# Patient Record
Sex: MALE | Race: WHITE | Employment: OTHER | URBAN - NONMETROPOLITAN AREA
[De-identification: names, ages, dates, MRNs, and addresses within clinical notes are randomized per-mention and may not be internally consistent; named-entity substitution may affect disease eponyms.]

---

## 2017-03-21 ENCOUNTER — OFFICE VISIT (OUTPATIENT)
Dept: FAMILY MEDICINE CLINIC | Age: 62
End: 2017-03-21

## 2017-03-21 VITALS
WEIGHT: 274.4 LBS | HEART RATE: 43 BPM | SYSTOLIC BLOOD PRESSURE: 119 MMHG | BODY MASS INDEX: 38.42 KG/M2 | HEIGHT: 71 IN | DIASTOLIC BLOOD PRESSURE: 74 MMHG | RESPIRATION RATE: 16 BRPM

## 2017-03-21 DIAGNOSIS — R00.1 BRADYCARDIA: ICD-10-CM

## 2017-03-21 DIAGNOSIS — E11.9 TYPE 2 DIABETES MELLITUS WITHOUT COMPLICATION, WITHOUT LONG-TERM CURRENT USE OF INSULIN (HCC): ICD-10-CM

## 2017-03-21 DIAGNOSIS — C61 PRIMARY PROSTATE CANCER (HCC): Primary | ICD-10-CM

## 2017-03-21 PROCEDURE — 99202 OFFICE O/P NEW SF 15 MIN: CPT | Performed by: FAMILY MEDICINE

## 2017-03-21 PROCEDURE — 93000 ELECTROCARDIOGRAM COMPLETE: CPT | Performed by: FAMILY MEDICINE

## 2017-03-21 RX ORDER — ATORVASTATIN CALCIUM 20 MG/1
20 TABLET, FILM COATED ORAL DAILY
COMMUNITY
End: 2017-07-24 | Stop reason: SDUPTHER

## 2017-03-21 ASSESSMENT — PATIENT HEALTH QUESTIONNAIRE - PHQ9
1. LITTLE INTEREST OR PLEASURE IN DOING THINGS: 0
SUM OF ALL RESPONSES TO PHQ QUESTIONS 1-9: 1
2. FEELING DOWN, DEPRESSED OR HOPELESS: 1
SUM OF ALL RESPONSES TO PHQ9 QUESTIONS 1 & 2: 1

## 2017-03-21 ASSESSMENT — ENCOUNTER SYMPTOMS
EYE DISCHARGE: 0
SHORTNESS OF BREATH: 0
SORE THROAT: 0
RHINORRHEA: 0
ABDOMINAL PAIN: 0
WHEEZING: 0
PHOTOPHOBIA: 0
NAUSEA: 0
VOMITING: 0

## 2017-04-04 ENCOUNTER — TELEPHONE (OUTPATIENT)
Dept: FAMILY MEDICINE CLINIC | Age: 62
End: 2017-04-04

## 2017-06-22 ENCOUNTER — OFFICE VISIT (OUTPATIENT)
Dept: PRIMARY CARE CLINIC | Age: 62
End: 2017-06-22

## 2017-06-22 VITALS
HEART RATE: 82 BPM | SYSTOLIC BLOOD PRESSURE: 137 MMHG | DIASTOLIC BLOOD PRESSURE: 77 MMHG | RESPIRATION RATE: 18 BRPM | HEIGHT: 71 IN | WEIGHT: 262 LBS | OXYGEN SATURATION: 95 % | TEMPERATURE: 96.1 F | BODY MASS INDEX: 36.68 KG/M2

## 2017-06-22 DIAGNOSIS — L73.9 ACUTE FOLLICULITIS: ICD-10-CM

## 2017-06-22 DIAGNOSIS — L02.211 ABSCESS OF SKIN OF ABDOMEN: Primary | ICD-10-CM

## 2017-06-22 PROCEDURE — 99214 OFFICE O/P EST MOD 30 MIN: CPT | Performed by: NURSE PRACTITIONER

## 2017-06-22 PROCEDURE — 10060 I&D ABSCESS SIMPLE/SINGLE: CPT | Performed by: NURSE PRACTITIONER

## 2017-06-22 PROCEDURE — A4930 STERILE, GLOVES PER PAIR: HCPCS | Performed by: NURSE PRACTITIONER

## 2017-06-22 PROCEDURE — A4215 STERILE NEEDLE: HCPCS | Performed by: NURSE PRACTITIONER

## 2017-06-22 RX ORDER — SILDENAFIL CITRATE 20 MG/1
2 TABLET ORAL DAILY
COMMUNITY
Start: 2017-05-18 | End: 2018-08-16

## 2017-06-22 RX ORDER — SULFAMETHOXAZOLE AND TRIMETHOPRIM 800; 160 MG/1; MG/1
1 TABLET ORAL 2 TIMES DAILY
Qty: 20 TABLET | Refills: 0 | Status: SHIPPED | OUTPATIENT
Start: 2017-06-22 | End: 2017-07-02

## 2017-06-22 ASSESSMENT — ENCOUNTER SYMPTOMS
VOMITING: 0
CONSTIPATION: 0
NAUSEA: 0
CHANGE IN BOWEL HABIT: 0
SWOLLEN GLANDS: 0
ABDOMINAL DISTENTION: 0
DIARRHEA: 0
ABDOMINAL PAIN: 0

## 2017-06-23 ENCOUNTER — TELEPHONE (OUTPATIENT)
Dept: PRIMARY CARE CLINIC | Age: 62
End: 2017-06-23

## 2017-06-23 DIAGNOSIS — L08.9 STAPHYLOCOCCAL INFECTION OF SKIN: Primary | ICD-10-CM

## 2017-06-23 DIAGNOSIS — B95.8 STAPHYLOCOCCAL INFECTION OF SKIN: Primary | ICD-10-CM

## 2017-06-23 ASSESSMENT — ENCOUNTER SYMPTOMS: COLOR CHANGE: 0

## 2017-06-26 ENCOUNTER — OFFICE VISIT (OUTPATIENT)
Dept: PRIMARY CARE CLINIC | Age: 62
End: 2017-06-26

## 2017-06-26 VITALS
DIASTOLIC BLOOD PRESSURE: 75 MMHG | RESPIRATION RATE: 16 BRPM | OXYGEN SATURATION: 95 % | TEMPERATURE: 98.9 F | SYSTOLIC BLOOD PRESSURE: 119 MMHG | HEART RATE: 52 BPM

## 2017-06-26 DIAGNOSIS — Z51.89 WOUND CHECK, ABSCESS: Primary | ICD-10-CM

## 2017-06-26 DIAGNOSIS — A49.02 MRSA (METHICILLIN RESISTANT STAPHYLOCOCCUS AUREUS): ICD-10-CM

## 2017-06-26 PROBLEM — D64.9 ANEMIA: Status: ACTIVE | Noted: 2017-06-26

## 2017-06-26 PROBLEM — Z92.89 PERSONAL HISTORY OF OTHER MEDICAL TREATMENT: Status: ACTIVE | Noted: 2017-06-26

## 2017-06-26 PROBLEM — K42.9 UMBILICAL HERNIA WITHOUT OBSTRUCTION AND WITHOUT GANGRENE: Status: ACTIVE | Noted: 2017-05-02

## 2017-06-26 PROBLEM — N43.3 HYDROCELE: Status: ACTIVE | Noted: 2017-06-26

## 2017-06-26 PROBLEM — E78.5 HYPERLIPIDEMIA: Status: ACTIVE | Noted: 2017-06-26

## 2017-06-26 PROBLEM — Z87.438: Status: ACTIVE | Noted: 2017-06-26

## 2017-06-26 PROCEDURE — 99024 POSTOP FOLLOW-UP VISIT: CPT | Performed by: NURSE PRACTITIONER

## 2017-06-26 ASSESSMENT — ENCOUNTER SYMPTOMS
ABDOMINAL DISTENTION: 0
NAUSEA: 0
ABDOMINAL PAIN: 0
VOMITING: 0
COLOR CHANGE: 0

## 2017-06-27 LAB
AEROBIC CULTURE: ABNORMAL
ANAEROBIC CULTURE: ABNORMAL
GRAM STAIN RESULT: ABNORMAL
ORGANISM: ABNORMAL

## 2017-07-24 ENCOUNTER — OFFICE VISIT (OUTPATIENT)
Dept: FAMILY MEDICINE CLINIC | Age: 62
End: 2017-07-24
Payer: COMMERCIAL

## 2017-07-24 VITALS
HEIGHT: 71 IN | BODY MASS INDEX: 36.82 KG/M2 | HEART RATE: 56 BPM | DIASTOLIC BLOOD PRESSURE: 84 MMHG | WEIGHT: 263 LBS | SYSTOLIC BLOOD PRESSURE: 116 MMHG | RESPIRATION RATE: 12 BRPM | TEMPERATURE: 97.9 F

## 2017-07-24 DIAGNOSIS — Z12.11 SCREEN FOR COLON CANCER: ICD-10-CM

## 2017-07-24 DIAGNOSIS — M25.511 ACUTE PAIN OF RIGHT SHOULDER: ICD-10-CM

## 2017-07-24 DIAGNOSIS — Z11.59 NEED FOR HEPATITIS C SCREENING TEST: ICD-10-CM

## 2017-07-24 DIAGNOSIS — C61 PRIMARY PROSTATE CANCER (HCC): ICD-10-CM

## 2017-07-24 DIAGNOSIS — E11.9 TYPE 2 DIABETES MELLITUS WITHOUT COMPLICATION, WITHOUT LONG-TERM CURRENT USE OF INSULIN (HCC): Primary | ICD-10-CM

## 2017-07-24 DIAGNOSIS — H11.053: ICD-10-CM

## 2017-07-24 DIAGNOSIS — E78.2 MIXED HYPERLIPIDEMIA: ICD-10-CM

## 2017-07-24 DIAGNOSIS — Z90.79 S/P PROSTATECTOMY: ICD-10-CM

## 2017-07-24 PROCEDURE — 99204 OFFICE O/P NEW MOD 45 MIN: CPT | Performed by: NURSE PRACTITIONER

## 2017-07-24 RX ORDER — ATORVASTATIN CALCIUM 20 MG/1
20 TABLET, FILM COATED ORAL DAILY
Qty: 90 TABLET | Refills: 3 | Status: SHIPPED | OUTPATIENT
Start: 2017-07-24 | End: 2018-02-22 | Stop reason: SDUPTHER

## 2017-10-16 ENCOUNTER — OFFICE VISIT (OUTPATIENT)
Dept: UROLOGY | Age: 62
End: 2017-10-16
Payer: COMMERCIAL

## 2017-10-16 VITALS
WEIGHT: 273 LBS | SYSTOLIC BLOOD PRESSURE: 149 MMHG | BODY MASS INDEX: 38.22 KG/M2 | HEIGHT: 71 IN | DIASTOLIC BLOOD PRESSURE: 88 MMHG | HEART RATE: 58 BPM

## 2017-10-16 DIAGNOSIS — C61 MALIGNANT NEOPLASM PROSTATE (HCC): Primary | ICD-10-CM

## 2017-10-16 LAB
BILIRUBIN URINE: NEGATIVE
BLOOD URINE, POC: NEGATIVE
CHARACTER, URINE: CLEAR
COLOR, URINE: YELLOW
GLUCOSE URINE: NEGATIVE MG/DL
KETONES, URINE: NEGATIVE
LEUKOCYTE CLUMPS, URINE: NEGATIVE
NITRITE, URINE: NEGATIVE
PH, URINE: 5.5
PROTEIN, URINE: NEGATIVE MG/DL
SPECIFIC GRAVITY, URINE: >= 1.03 (ref 1–1.03)
UROBILINOGEN, URINE: 0.2 EU/DL

## 2017-10-16 PROCEDURE — 99204 OFFICE O/P NEW MOD 45 MIN: CPT | Performed by: NURSE PRACTITIONER

## 2017-10-16 PROCEDURE — 81003 URINALYSIS AUTO W/O SCOPE: CPT | Performed by: NURSE PRACTITIONER

## 2017-10-16 NOTE — PROGRESS NOTES
[facility]  [facility]  [address] [address] [address]  Dept: [phone]  Loc: [phone]  Visit Date: 10/16/2017      HPI:     Yanet Hernández is a 58 y.o. with past medical history of Diabetes and prostate cancer who presents today to establish local care for his prostate cancer. Sourav Denton most recently underwent a robot-assisted laparoscopic radical prostatectomy by Dr. Kenyetta Read in [place] in 05/2017. Final pathology revealed [place] score 3+4 = 7, grade group 2/5. Adenocarcinoma was bilateral and involves roughly 15-20% of prostate gland. There was very focal extraprostatic extension and shows invasion into bladder neck. Infiltrating adenocarcinoma extends to bladder neck margin. Remaining apical and peripheral margins of prostatectomy specimen are free of malignancy. There was focal changes suspicious for lymphatic space invasion by carcinoma, 8 lymph nodes were negative for metastatic neoplasm, pT3a, pN0. Sourav Denton is doing well postop. He reports some stress incontinence with coughing, bending down. He is currently taking Revatio 20 mg daily for a penile rehab program. Denies any spontaneous erections. Initial PSA on 8/17/17 was undetectable, less than 0.03. He comes in today by himself. History is obtained from the patient and the medical record. Current Outpatient Prescriptions   Medication Sig Dispense Refill    metFORMIN (GLUCOPHAGE) 500 MG tablet Take 1 tablet by mouth 2 times daily (with meals) 180 tablet 3    atorvastatin (LIPITOR) 20 MG tablet Take 1 tablet by mouth daily 90 tablet 3    sildenafil (REVATIO) 20 MG tablet Take 2 tablets by mouth daily      aspirin 81 MG chewable tablet Take 81 mg by mouth daily. No current facility-administered medications for this visit. Past Medical History  Sourav Denton  has a past medical history of Cancer ([address] Diabetes type 2, controlled ([address] and Hypertensive retinopathy.     Past Surgical History  The patient has a past surgical history that includes Biceps tendon repair (Right, 2015); knee surgery (Bilateral, 2011); hernia repair (05/02/2017); and ostatectomy (05/2017). Family History  This patient's family history includes Alzheimer's Disease in his mother; Cancer in his father; Other in his mother. Social History  Huber Moran  reports that he quit smoking about 6 years ago. He smoked 5.00 packs per day. He has quit using smokeless tobacco. He reports that he drinks alcohol. He reports that he does not use drugs. Subjective:     Review of Systems  No problems with ears, nose or throat. No problems with eyes. No chest pain, shortness of breath, abdominal pain, extremity pain or weakness, and no neurological deficits. No rashes.  symptoms per HPI. The remainder of the review of symptoms is negative. Objective:     PE:   Vitals:    10/16/17 1337   BP: (!) 149/88   Site: Left Arm   Position: Sitting   Pulse: 58   Weight: 273 lb (123.8 kg)   Height: 5' 11\" (1.803 m)       Constitutional: Alert and oriented times 3, no acute distress and cooperative to examination with appropriate mood and affect. HENT:   Head:        Normocephalic and atraumatic. Mouth/Throat:         Mucous membranes are normal.   Eyes:         EOM are normal. No scleral icterus. PERRLA. Neck:        Supple, symmetrical, trachea midline  Cardiovascular:        Normal rate, regular rhythm, S1 S2 heart sounds. No murmurs, rub, or gallops. Pulses:       Radial pulses are 2+/4 bilateral and equal. Posterior tibialis 2+/4 bilateral and equal  Pulmonary/Chest:      Chest symmetric with normal A/P diameter,  CTA with no wheezes, rales, or rhonchi noted. Normal respiratory rate and rhthym. No use of accessory muscles. Abdominal:         Soft. No tenderness. Bowel sounds present. Musculoskeletal:         Normal range of motion. No edema or tenderness of lower extremities.     Extremities: No cyanosis, clubbing, or edema

## 2017-11-15 ENCOUNTER — HOSPITAL ENCOUNTER (OUTPATIENT)
Age: 62
Discharge: HOME | End: 2017-11-15
Payer: COMMERCIAL

## 2017-11-15 DIAGNOSIS — C61 MALIGNANT NEOPLASM PROSTATE (HCC): ICD-10-CM

## 2017-11-15 LAB — PROSTATE SPECIFIC ANTIGEN: < 0.02 NG/ML (ref 0–1)

## 2017-11-15 PROCEDURE — 84153 ASSAY OF PSA TOTAL: CPT

## 2017-11-15 PROCEDURE — 36415 COLL VENOUS BLD VENIPUNCTURE: CPT

## 2017-11-21 ENCOUNTER — TELEPHONE (OUTPATIENT)
Dept: UROLOGY | Age: 62
End: 2017-11-21

## 2018-02-12 ENCOUNTER — TELEPHONE (OUTPATIENT)
Dept: FAMILY MEDICINE CLINIC | Age: 63
End: 2018-02-12

## 2018-02-13 NOTE — TELEPHONE ENCOUNTER
Reprint labs from July. I will be happy to review prostate labs with him.  I still recommend f/u with urology annually

## 2018-02-14 DIAGNOSIS — Z12.11 SCREEN FOR COLON CANCER: ICD-10-CM

## 2018-02-14 LAB
CONTROL: PRESENT
HEMOCCULT STL QL: NEGATIVE

## 2018-02-14 PROCEDURE — 82274 ASSAY TEST FOR BLOOD FECAL: CPT | Performed by: NURSE PRACTITIONER

## 2018-02-16 LAB
A/G RATIO: 2.3 (ref 1.5–2.5)
ALBUMIN SERPL-MCNC: 5 GM/DL (ref 3.5–5)
ALP BLD-CCNC: 56 IU/L (ref 41–137)
ALT SERPL-CCNC: 27 IU/L (ref 10–40)
ANION GAP SERPL CALCULATED.3IONS-SCNC: 5 MMOL/L (ref 4–12)
AST SERPL-CCNC: 23 IU/L (ref 15–41)
BILIRUB SERPL-MCNC: 0.9 MG/DL (ref 0.2–1)
BUN BLDV-MCNC: 14 MG/DL (ref 7–20)
CALCIUM SERPL-MCNC: 9.4 MG/DL (ref 8.8–10.5)
CHLORIDE BLD-SCNC: 107 MEQ/L (ref 101–111)
CHOLESTEROL/HDL RELATIVE RISK: 3 (ref 4–5)
CHOLESTEROL: 117 MG/DL
CO2: 28 MEQ/L (ref 21–32)
CREAT SERPL-MCNC: 1.11 MG/DL (ref 0.7–1.3)
CREATININE CLEARANCE: >60
DIRECT-LDL / HDL RISK: 2.1
ESTIMATED AVERAGE GLUCOSE: 166 MG/DL
GLUCOSE: 163 MG/DL (ref 70–110)
HBA1C MFR BLD: 7.4 % (ref 4.4–6.4)
HCV AB: NONREACTIVE
HDLC SERPL-MCNC: 38 MG/DL
LDL CHOLESTEROL DIRECT: 82 MG/DL
POTASSIUM SERPL-SCNC: 4.6 MEQ/L (ref 3.6–5)
PROSTATE SPECIFIC ANTIGEN: <0.01 NG/ML
PROTEIN, URINE, RANDOM: 9 MG/DL
SODIUM BLD-SCNC: 140 MEQ/L (ref 135–145)
TOTAL PROTEIN: 7.2 G/DL (ref 6.2–8)
TRIGL SERPL-MCNC: 72 MG/DL
VLDLC SERPL CALC-MCNC: 14 MG/DL

## 2018-02-20 ENCOUNTER — TELEPHONE (OUTPATIENT)
Dept: FAMILY MEDICINE CLINIC | Age: 63
End: 2018-02-20

## 2018-02-22 ENCOUNTER — OFFICE VISIT (OUTPATIENT)
Dept: FAMILY MEDICINE CLINIC | Age: 63
End: 2018-02-22
Payer: COMMERCIAL

## 2018-02-22 VITALS
WEIGHT: 266 LBS | RESPIRATION RATE: 16 BRPM | HEART RATE: 56 BPM | HEIGHT: 71 IN | BODY MASS INDEX: 37.24 KG/M2 | DIASTOLIC BLOOD PRESSURE: 78 MMHG | TEMPERATURE: 98 F | SYSTOLIC BLOOD PRESSURE: 124 MMHG

## 2018-02-22 DIAGNOSIS — E11.9 TYPE 2 DIABETES MELLITUS WITHOUT COMPLICATION, WITHOUT LONG-TERM CURRENT USE OF INSULIN (HCC): Primary | ICD-10-CM

## 2018-02-22 DIAGNOSIS — E78.2 MIXED HYPERLIPIDEMIA: ICD-10-CM

## 2018-02-22 DIAGNOSIS — C61 PRIMARY PROSTATE CANCER (HCC): ICD-10-CM

## 2018-02-22 PROCEDURE — 99213 OFFICE O/P EST LOW 20 MIN: CPT | Performed by: NURSE PRACTITIONER

## 2018-02-22 RX ORDER — ATORVASTATIN CALCIUM 20 MG/1
20 TABLET, FILM COATED ORAL DAILY
Qty: 90 TABLET | Refills: 3 | Status: CANCELLED | OUTPATIENT
Start: 2018-02-22

## 2018-02-22 RX ORDER — ATORVASTATIN CALCIUM 20 MG/1
20 TABLET, FILM COATED ORAL DAILY
Qty: 90 TABLET | Refills: 3 | Status: SHIPPED | OUTPATIENT
Start: 2018-02-22 | End: 2018-02-22 | Stop reason: SDUPTHER

## 2018-02-22 RX ORDER — ATORVASTATIN CALCIUM 20 MG/1
20 TABLET, FILM COATED ORAL DAILY
Qty: 90 TABLET | Refills: 3 | Status: SHIPPED | OUTPATIENT
Start: 2018-02-22 | End: 2019-01-19 | Stop reason: SDUPTHER

## 2018-02-27 NOTE — PROGRESS NOTES
syncope. IV only     Health Maintenance   Topic Date Due    HIV screen  03/21/1970    Diabetic microalbuminuria test  03/21/1973    DTaP/Tdap/Td vaccine (1 - Tdap) 03/21/1974    Pneumococcal med risk (1 of 1 - PPSV23) 03/21/1974    Shingles Vaccine (1 of 2 - 2 Dose Series) 03/21/2005    Flu vaccine (1) 09/01/2017    Diabetic retinal exam  05/10/2018    Diabetic foot exam  07/24/2018    Colon Cancer Screen FIT/FOBT  02/14/2019    A1C test (Diabetic or Prediabetic)  02/16/2019    Lipid screen  02/16/2019    Hepatitis C screen  Completed         Objective:     Physical Exam  /78   Pulse 56   Temp 98 °F (36.7 °C) (Oral)   Resp 16   Ht 5' 10.75\" (1.797 m)   Wt 266 lb (120.7 kg)   BMI 37.36 kg/m²   Component      Latest Ref Rng & Units 2/16/2018 2/14/2018   Sodium      135 - 145 mEq/L 140    Potassium      3.6 - 5.0 mEq/L 4.6    Chloride      101 - 111 mEq/L 107    CO2      21 - 32 mEq/L 28    Anion Gap      4 - 12 5    Glucose      70 - 110 mg/dL 163 (H)    BUN      7 - 20 mg/dL 14    Creatinine      0.70 - 1.30 mg/dL 1.11    Creatinine Clearance       >60    AST      15 - 41 IU/L 23    Alk Phos      41 - 137 IU/L 56    Bilirubin      0.2 - 1.0 mg/dL 0.9    Calcium      8.8 - 10.5 mg/dL 9.4    Albumin      3.5 - 5.0 gm/dL 5.0    Total Protein      6.2 - 8.0 g/dL 7.2    Albumin/Globulin Ratio      1.5 - 2.5 2.3    ALT      10 - 40 IU/L 27    LDL Direct      mg/dl 82    Cholesterol      <200 mg/dL 117    Triglycerides      <150 mg/dL 72    HDL Cholesterol      mg/dL 38 (L)    CHOLESTEROL/HDL RELATIVE RISK      4.0 - 5.0 3.0 (L)    Direct-LDL / HDL Risk      <3.1 2.1    VLDL      <39 mg/dL 14    OCCULT BLOOD FECAL        Negative   Control        Present   Hemoglobin A1C      4.4 - 6.4 % 7.4 (H)    eAG (mg/dL)      mg/dL 166    Protein, Urine, Random      mg/dL 9.0    Prostatic Specific Ag      ng/mL <0.01    HCV Ab      Nonreactiv Nonreactive        Impression/Plan:  1.  Type 2 diabetes mellitus

## 2018-07-24 ENCOUNTER — HOSPITAL ENCOUNTER (OUTPATIENT)
Age: 63
Setting detail: SPECIMEN
Discharge: HOME | End: 2018-07-24
Payer: COMMERCIAL

## 2018-07-24 PROCEDURE — 88305 TISSUE EXAM BY PATHOLOGIST: CPT

## 2018-08-13 ENCOUNTER — TELEPHONE (OUTPATIENT)
Dept: FAMILY MEDICINE CLINIC | Age: 63
End: 2018-08-13

## 2018-08-13 DIAGNOSIS — D64.9 ANEMIA, UNSPECIFIED TYPE: ICD-10-CM

## 2018-08-13 DIAGNOSIS — Z12.5 PROSTATE CANCER SCREENING: ICD-10-CM

## 2018-08-13 DIAGNOSIS — E78.2 MIXED HYPERLIPIDEMIA: ICD-10-CM

## 2018-08-13 DIAGNOSIS — E11.9 TYPE 2 DIABETES MELLITUS WITHOUT COMPLICATION, WITHOUT LONG-TERM CURRENT USE OF INSULIN (HCC): Primary | ICD-10-CM

## 2018-08-15 LAB
A/G RATIO: 1.9 (ref 1.5–2.5)
ABSOLUTE BASO #: 0 /CMM (ref 0–200)
ABSOLUTE EOS #: 200 /CMM (ref 0–500)
ABSOLUTE LYMPH #: 1700 /CMM (ref 1000–4800)
ABSOLUTE MONO #: 600 /CMM (ref 0–800)
ABSOLUTE NEUT #: 4000 /CMM (ref 1800–7700)
ALBUMIN SERPL-MCNC: 4.5 GM/DL (ref 3.5–5)
ALP BLD-CCNC: 51 IU/L (ref 41–137)
ALT SERPL-CCNC: 28 IU/L (ref 10–40)
ANION GAP SERPL CALCULATED.3IONS-SCNC: 8 MMOL/L (ref 4–12)
AST SERPL-CCNC: 21 IU/L (ref 15–41)
BASOPHILS RELATIVE PERCENT: 0.7 % (ref 0–2)
BILIRUB SERPL-MCNC: 0.6 MG/DL (ref 0.2–1)
BUN BLDV-MCNC: 19 MG/DL (ref 7–20)
CALCIUM SERPL-MCNC: 8.9 MG/DL (ref 8.8–10.5)
CHLORIDE BLD-SCNC: 105 MEQ/L (ref 101–111)
CHOLESTEROL/HDL RELATIVE RISK: 2.9 (ref 4–5)
CHOLESTEROL: 105 MG/DL
CO2: 27 MEQ/L (ref 21–32)
CREAT SERPL-MCNC: 0.93 MG/DL (ref 0.7–1.3)
CREATININE CLEARANCE: >60
CREATININE, RANDOM URINE: 255.4 MG/DL
DIRECT-LDL / HDL RISK: 1.9
EOSINOPHILS RELATIVE PERCENT: 3.6 % (ref 0–6)
ESTIMATED AVERAGE GLUCOSE: 163 MG/DL
GLUCOSE: 158 MG/DL (ref 70–110)
HBA1C MFR BLD: 7.3 % (ref 4.4–6.4)
HCT VFR BLD CALC: 46.4 % (ref 40–49)
HDLC SERPL-MCNC: 36 MG/DL
HEMOGLOBIN: 15.8 GM/DL (ref 13.5–16.5)
LDL CHOLESTEROL DIRECT: 69 MG/DL
LYMPHOCYTES RELATIVE PERCENT: 25.9 % (ref 15–45)
MCH RBC QN AUTO: 29.8 PG (ref 27.5–33)
MCHC RBC AUTO-ENTMCNC: 33.9 GM/DL (ref 33–36)
MCV RBC AUTO: 87.8 CU MIC (ref 80–97)
MICROALBUMIN UR-MCNC: 9.3 MG/L
MICROALBUMIN/CREAT UR-RTO: 3.6 MG/GM (ref 0–30)
MONOCYTES RELATIVE PERCENT: 9.4 % (ref 2–10)
NEUTROPHILS RELATIVE PERCENT: 60.4 % (ref 40–70)
NUCLEATED RBCS: 0 /100 WBC
PDW BLD-RTO: 13.8 % (ref 12–16)
PLATELET # BLD: 204 TH/CMM (ref 150–400)
POTASSIUM SERPL-SCNC: 4.5 MEQ/L (ref 3.6–5)
PROSTATE SPECIFIC ANTIGEN: 0.01 NG/ML
RBC # BLD: 5.29 MIL/CMM (ref 4.5–6)
SODIUM BLD-SCNC: 140 MEQ/L (ref 135–145)
TOTAL PROTEIN: 6.9 G/DL (ref 6.2–8)
TRIGL SERPL-MCNC: 59 MG/DL
VLDLC SERPL CALC-MCNC: 11 MG/DL
WBC # BLD: 6.7 TH/CMM (ref 4.4–10.5)

## 2018-08-16 ENCOUNTER — OFFICE VISIT (OUTPATIENT)
Dept: FAMILY MEDICINE CLINIC | Age: 63
End: 2018-08-16
Payer: COMMERCIAL

## 2018-08-16 VITALS
DIASTOLIC BLOOD PRESSURE: 86 MMHG | TEMPERATURE: 98.6 F | HEIGHT: 69 IN | HEART RATE: 60 BPM | WEIGHT: 261 LBS | BODY MASS INDEX: 38.66 KG/M2 | SYSTOLIC BLOOD PRESSURE: 136 MMHG | RESPIRATION RATE: 16 BRPM

## 2018-08-16 DIAGNOSIS — E78.2 MIXED HYPERLIPIDEMIA: ICD-10-CM

## 2018-08-16 DIAGNOSIS — C61 PRIMARY PROSTATE CANCER (HCC): ICD-10-CM

## 2018-08-16 DIAGNOSIS — M54.50 CHRONIC LEFT-SIDED LOW BACK PAIN WITHOUT SCIATICA: ICD-10-CM

## 2018-08-16 DIAGNOSIS — E11.9 TYPE 2 DIABETES MELLITUS WITHOUT COMPLICATION, WITHOUT LONG-TERM CURRENT USE OF INSULIN (HCC): Primary | ICD-10-CM

## 2018-08-16 DIAGNOSIS — G89.29 CHRONIC LEFT-SIDED LOW BACK PAIN WITHOUT SCIATICA: ICD-10-CM

## 2018-08-16 PROCEDURE — 99214 OFFICE O/P EST MOD 30 MIN: CPT | Performed by: NURSE PRACTITIONER

## 2018-08-16 ASSESSMENT — PATIENT HEALTH QUESTIONNAIRE - PHQ9
SUM OF ALL RESPONSES TO PHQ QUESTIONS 1-9: 0
2. FEELING DOWN, DEPRESSED OR HOPELESS: 0
SUM OF ALL RESPONSES TO PHQ QUESTIONS 1-9: 0
SUM OF ALL RESPONSES TO PHQ9 QUESTIONS 1 & 2: 0
1. LITTLE INTEREST OR PLEASURE IN DOING THINGS: 0

## 2018-08-16 NOTE — PATIENT INSTRUCTIONS
touching the floor and the other heel touching the wall. 4. Repeat with your other leg. 5. Do 2 to 4 times for each leg. Hip flexor stretch    1. Kneel on the floor with one knee bent and one leg behind you. Place your forward knee over your foot. Keep your other knee touching the floor. 2. Slowly push your hips forward until you feel a stretch in the upper thigh of your rear leg. 3. Hold the stretch for at least 15 to 30 seconds. Repeat with your other leg. 4. Do 2 to 4 times on each side. Wall sit    1. Stand with your back 10 to 12 inches away from a wall. 2. Lean into the wall until your back is flat against it. 3. Slowly slide down until your knees are slightly bent, pressing your lower back into the wall. 4. Hold for about 6 seconds, then slide back up the wall. 5. Repeat 8 to 12 times. Follow-up care is a key part of your treatment and safety. Be sure to make and go to all appointments, and call your doctor if you are having problems. It's also a good idea to know your test results and keep a list of the medicines you take. Where can you learn more? Go to https://[employer]WEALTH at work. org and sign in to your [employer] account. Enter J703 in the [employer] box to learn more about \"Low Back Pain: Exercises. \"     If you do not have an account, please click on the \"Sign Up Now\" link. Current as of: November 29, 2017  Content Version: 11.7  © 0296-6745 [employer]. Care instructions adapted under license by [facility] [address] If you have questions about a medical condition or this instruction, always ask your healthcare professional. [place] [address] any warranty or liability for your use of this information. Patient Education        [employer]: Exercises  Your Care Instructions  Here are some examples of typical rehabilitation exercises for your condition. Start each exercise slowly. Ease off the exercise if you start to have pain.   Your doctor or

## 2018-08-27 ENCOUNTER — TELEPHONE (OUTPATIENT)
Dept: FAMILY MEDICINE CLINIC | Age: 63
End: 2018-08-27

## 2018-08-30 ASSESSMENT — ENCOUNTER SYMPTOMS
EYES NEGATIVE: 1
RESPIRATORY NEGATIVE: 1
BACK PAIN: 1
GASTROINTESTINAL NEGATIVE: 1
ALLERGIC/IMMUNOLOGIC NEGATIVE: 1

## 2018-08-30 NOTE — PROGRESS NOTES
Outpatient Prescriptions   Medication Sig Dispense Refill    empagliflozin (JARDIANCE) 10 MG tablet Take 1 tablet by mouth daily 30 tablet 0    atorvastatin (LIPITOR) 20 MG tablet Take 1 tablet by mouth daily 90 tablet 3    metFORMIN (GLUCOPHAGE) 500 MG tablet Take 2 tabs in am  Then 1 tab in evening 270 tablet 3    aspirin 81 MG chewable tablet Take 81 mg by mouth daily. No current facility-administered medications for this visit. Allergies   Allergen Reactions    Dye [Iodides] Other (See Comments)     Nausea, alternating hot and cold and syncope. IV only     Health Maintenance   Topic Date Due    HIV screen  03/21/1970    DTaP/Tdap/Td vaccine (1 - Tdap) 03/21/1974    Pneumococcal med risk (1 of 1 - PPSV23) 03/21/1974    Shingles Vaccine (1 of 2 - 2 Dose Series) 03/21/2005    Diabetic retinal exam  05/10/2018    Diabetic foot exam  07/24/2018    Flu vaccine (1) 09/01/2018    Colon Cancer Screen FIT/FOBT  02/14/2019    A1C test (Diabetic or Prediabetic)  08/15/2019    Diabetic microalbuminuria test  08/15/2019    Lipid screen  08/15/2019    Hepatitis C screen  Completed         Objective:     Physical Exam   Constitutional: He is oriented to person, place, and time. He appears well-developed and well-nourished. HENT:   Head: Normocephalic. Mouth/Throat: Oropharynx is clear and moist.   Eyes: Conjunctivae are normal.   Neck: Neck supple. No JVD present. No thyromegaly present. Cardiovascular: Normal rate, regular rhythm, normal heart sounds and intact distal pulses. Pulmonary/Chest: Effort normal and breath sounds normal.   Abdominal: Soft. Bowel sounds are normal.   Musculoskeletal: Normal range of motion. Back:    Left si joint tenderness. Lymphadenopathy:     He has no cervical adenopathy. Neurological: He is alert and oriented to person, place, and time. No cranial nerve deficit.  Coordination normal.   Reflex Scores:       Patellar reflexes are 2+ on the right side and 2+ on the left side. Equal strength lower ext. Neg pedal exam.   Skin: Skin is warm and dry. Nursing note and vitals reviewed.     /86   Pulse 60   Temp 98.6 °F (37 °C) (Oral)   Resp 16   Ht 5' 9.25\" (1.759 m)   Wt 261 lb (118.4 kg)   BMI 38.27 kg/m²     Component      Latest Ref Rng & Units 8/15/2018   WBC      4.4 - 10.5 th/cmm 6.7   RBC      4.50 - 6.00 mil/cmm 5.29   Hemoglobin Quant      13.5 - 16.5 gm/dL 15.8   Hematocrit      40.0 - 49.0 % 46.4   MCV      80 - 97 CU RON 87.8   MCH      27.5 - 33.0 PG 29.8   MCHC      33.0 - 36.0 gm/dL 33.9   RDW      12.0 - 16.0 % 13.8   Platelet Count      695 - 400 th/cmm 204   Neutrophils %      40 - 70 % 60.4   Lymphocyte %      15 - 45 % 25.9   Monocytes %      2 - 10 % 9.4   Eosinophils %      0 - 6 % 3.6   Basophils %      0 - 2 % 0.7   Nucleated Red Blood Cells      <1 /100 WBC 0.0   Absolute Neut #      1800 - 7700 /cmm 4000   Absolute Lymph #      1000 - 4800 /cmm 1700   Absolute Mono #      0 - 800 /cmm 600   Absolute Eos #      0 - 500 /cmm 200   Basophils #      0 - 200 /cmm 0   Sodium      135 - 145 mEq/L 140   Potassium      3.6 - 5.0 mEq/L 4.5   Chloride      101 - 111 mEq/L 105   CO2      21 - 32 mEq/L 27   Anion Gap      4 - 12 8   Glucose      70 - 110 mg/dL 158 (H)   BUN      7 - 20 mg/dL 19   Creatinine      0.70 - 1.30 mg/dL 0.93   Creatinine Clearance       >60   AST      15 - 41 IU/L 21   Alk Phos      41 - 137 IU/L 51   Bilirubin      0.2 - 1.0 mg/dL 0.6   Calcium      8.8 - 10.5 mg/dL 8.9   Albumin      3.5 - 5.0 gm/dL 4.5   Total Protein      6.2 - 8.0 g/dL 6.9   Albumin/Globulin Ratio      1.5 - 2.5 1.9   ALT      10 - 40 IU/L 28   LDL Direct      mg/dl 69   Cholesterol      <200 mg/dL 105   Triglycerides      <150 mg/dL 59   HDL Cholesterol      mg/dL 36 (L)   CHOLESTEROL/HDL RELATIVE RISK      4.0 - 5.0 2.9 (L)   Direct-LDL / HDL Risk      <3.1 1.9   VLDL      <39 mg/dL 11   CREATININE, RANDOM URINE      mg/dL 255.4

## 2019-01-21 RX ORDER — ATORVASTATIN CALCIUM 20 MG/1
20 TABLET, FILM COATED ORAL DAILY
Qty: 90 TABLET | Refills: 1 | Status: SHIPPED | OUTPATIENT
Start: 2019-01-21 | End: 2019-06-26 | Stop reason: SDUPTHER

## 2019-02-26 ENCOUNTER — OFFICE VISIT (OUTPATIENT)
Dept: FAMILY MEDICINE CLINIC | Age: 64
End: 2019-02-26
Payer: COMMERCIAL

## 2019-02-26 VITALS
WEIGHT: 263.8 LBS | TEMPERATURE: 98.2 F | RESPIRATION RATE: 18 BRPM | SYSTOLIC BLOOD PRESSURE: 124 MMHG | BODY MASS INDEX: 39.07 KG/M2 | OXYGEN SATURATION: 97 % | HEART RATE: 71 BPM | DIASTOLIC BLOOD PRESSURE: 62 MMHG | HEIGHT: 69 IN

## 2019-02-26 DIAGNOSIS — E11.9 TYPE 2 DIABETES MELLITUS WITHOUT COMPLICATION, WITHOUT LONG-TERM CURRENT USE OF INSULIN (HCC): ICD-10-CM

## 2019-02-26 DIAGNOSIS — G25.2 COARSE TREMORS: ICD-10-CM

## 2019-02-26 DIAGNOSIS — C61 PRIMARY PROSTATE CANCER (HCC): ICD-10-CM

## 2019-02-26 DIAGNOSIS — R05.3 PERSISTENT COUGH: Primary | ICD-10-CM

## 2019-02-26 DIAGNOSIS — J40 BRONCHITIS: ICD-10-CM

## 2019-02-26 PROCEDURE — 99214 OFFICE O/P EST MOD 30 MIN: CPT | Performed by: NURSE PRACTITIONER

## 2019-02-26 RX ORDER — CEFUROXIME AXETIL 250 MG/1
250 TABLET ORAL 2 TIMES DAILY
Qty: 20 TABLET | Refills: 0 | Status: SHIPPED | OUTPATIENT
Start: 2019-02-26 | End: 2019-03-08

## 2019-02-26 ASSESSMENT — PATIENT HEALTH QUESTIONNAIRE - PHQ9
1. LITTLE INTEREST OR PLEASURE IN DOING THINGS: 0
2. FEELING DOWN, DEPRESSED OR HOPELESS: 0
SUM OF ALL RESPONSES TO PHQ9 QUESTIONS 1 & 2: 0
SUM OF ALL RESPONSES TO PHQ QUESTIONS 1-9: 0
SUM OF ALL RESPONSES TO PHQ QUESTIONS 1-9: 0

## 2019-02-28 ASSESSMENT — ENCOUNTER SYMPTOMS
EYES NEGATIVE: 1
GASTROINTESTINAL NEGATIVE: 1
COUGH: 1
CHEST TIGHTNESS: 0
SHORTNESS OF BREATH: 0

## 2019-03-02 LAB
A/G RATIO: 1.7 (ref 1.5–2.5)
ABSOLUTE BASO #: 0 /CMM (ref 0–200)
ABSOLUTE EOS #: 200 /CMM (ref 0–500)
ABSOLUTE LYMPH #: 1600 /CMM (ref 1000–4800)
ABSOLUTE MONO #: 600 /CMM (ref 0–800)
ABSOLUTE NEUT #: 3800 /CMM (ref 1800–7700)
ALBUMIN SERPL-MCNC: 4.5 GM/DL (ref 3.5–5)
ALP BLD-CCNC: 49 IU/L (ref 41–137)
ALT SERPL-CCNC: 34 IU/L (ref 10–40)
ANION GAP SERPL CALCULATED.3IONS-SCNC: 9 MMOL/L (ref 4–12)
AST SERPL-CCNC: 27 IU/L (ref 15–41)
BASOPHILS RELATIVE PERCENT: 0.5 % (ref 0–2)
BILIRUB SERPL-MCNC: 0.9 MG/DL (ref 0.2–1)
BUN BLDV-MCNC: 17 MG/DL (ref 7–20)
CALCIUM SERPL-MCNC: 9.2 MG/DL (ref 8.8–10.5)
CHLORIDE BLD-SCNC: 104 MEQ/L (ref 101–111)
CO2: 24 MEQ/L (ref 21–32)
CREAT SERPL-MCNC: 1.03 MG/DL (ref 0.7–1.3)
CREATININE CLEARANCE: >60
EOSINOPHILS RELATIVE PERCENT: 3.9 % (ref 0–6)
GLUCOSE: 150 MG/DL (ref 70–110)
HCT VFR BLD CALC: 48.1 % (ref 40–49)
HEMOGLOBIN: 16 GM/DL (ref 13.5–16.5)
LYMPHOCYTES RELATIVE PERCENT: 25 % (ref 15–45)
MCH RBC QN AUTO: 29 PG (ref 27.5–33)
MCHC RBC AUTO-ENTMCNC: 33.3 GM/DL (ref 33–36)
MCV RBC AUTO: 86.9 CU MIC (ref 80–97)
MONOCYTES RELATIVE PERCENT: 9 % (ref 2–10)
NEUTROPHILS RELATIVE PERCENT: 61.6 % (ref 40–70)
NUCLEATED RBCS: 0 /100 WBC
PDW BLD-RTO: 13.7 % (ref 12–16)
PLATELET # BLD: 195 TH/CMM (ref 150–400)
POTASSIUM SERPL-SCNC: 4.3 MEQ/L (ref 3.6–5)
PROSTATE SPECIFIC ANTIGEN: <0.01 NG/ML
RBC # BLD: 5.53 MIL/CMM (ref 4.5–6)
SODIUM BLD-SCNC: 137 MEQ/L (ref 135–145)
T4 FREE: 0.87 NG/DL (ref 0.61–1.12)
TOTAL PROTEIN: 7.1 G/DL (ref 6.2–8)
TSH SERPL DL<=0.05 MIU/L-ACNC: 1.23 MCIU/ML (ref 0.49–4.67)
WBC # BLD: 6.2 TH/CMM (ref 4.4–10.5)

## 2019-03-03 LAB
ESTIMATED AVERAGE GLUCOSE: 197 MG/DL
HBA1C MFR BLD: 8.5 % (ref 4.4–6.4)

## 2019-03-05 ENCOUNTER — TELEPHONE (OUTPATIENT)
Dept: FAMILY MEDICINE CLINIC | Age: 64
End: 2019-03-05

## 2019-03-18 ENCOUNTER — HOSPITAL ENCOUNTER (OUTPATIENT)
Dept: CT IMAGING | Age: 64
Discharge: HOME | End: 2019-03-18
Payer: COMMERCIAL

## 2019-03-18 DIAGNOSIS — C61 PRIMARY PROSTATE CANCER (HCC): ICD-10-CM

## 2019-03-18 DIAGNOSIS — G25.2 COARSE TREMORS: ICD-10-CM

## 2019-03-18 PROCEDURE — 70450 CT HEAD/BRAIN W/O DYE: CPT

## 2019-03-26 ENCOUNTER — TELEPHONE (OUTPATIENT)
Dept: FAMILY MEDICINE CLINIC | Age: 64
End: 2019-03-26

## 2019-03-26 ENCOUNTER — OFFICE VISIT (OUTPATIENT)
Dept: FAMILY MEDICINE CLINIC | Age: 64
End: 2019-03-26
Payer: COMMERCIAL

## 2019-03-26 VITALS
RESPIRATION RATE: 18 BRPM | DIASTOLIC BLOOD PRESSURE: 68 MMHG | HEIGHT: 69 IN | TEMPERATURE: 98.4 F | SYSTOLIC BLOOD PRESSURE: 130 MMHG | BODY MASS INDEX: 39.16 KG/M2 | OXYGEN SATURATION: 98 % | WEIGHT: 264.4 LBS | HEART RATE: 64 BPM

## 2019-03-26 DIAGNOSIS — C61 PRIMARY PROSTATE CANCER (HCC): ICD-10-CM

## 2019-03-26 DIAGNOSIS — E78.2 MIXED HYPERLIPIDEMIA: ICD-10-CM

## 2019-03-26 DIAGNOSIS — E11.9 TYPE 2 DIABETES MELLITUS WITHOUT COMPLICATION, WITHOUT LONG-TERM CURRENT USE OF INSULIN (HCC): Primary | ICD-10-CM

## 2019-03-26 PROCEDURE — 99214 OFFICE O/P EST MOD 30 MIN: CPT | Performed by: NURSE PRACTITIONER

## 2019-04-03 ASSESSMENT — ENCOUNTER SYMPTOMS
GASTROINTESTINAL NEGATIVE: 1
EYES NEGATIVE: 1
RESPIRATORY NEGATIVE: 1

## 2019-04-03 NOTE — PROGRESS NOTES
[facility]  [facility]  [address] [address]  Dept: [phone]  Dept Fax: [phone]  Loc: [phone]  PROGRESS NOTE      VisitDate: 3/26/2019    Cora Calhoun is a 59 y.o. male who presents today for:     Chief Complaint   Patient presents with    Discuss Labs     03/04/2019    Cough     still having issues from 02/26/2019 OV- not as bad     Component      Latest Ref Rng & Units 3/2/2019   WBC      4.4 - 10.5 th/cmm 6.2   RBC      4.50 - 6.00 mil/cmm 5.53   Hemoglobin Quant      13.5 - 16.5 gm/dL 16.0   Hematocrit      40.0 - 49.0 % 48.1   MCV      80 - 97 CU RON 86.9   MCH      27.5 - 33.0 PG 29.0   MCHC      33.0 - 36.0 gm/dL 33.3   RDW      12.0 - 16.0 % 13.7   Platelet Count      032 - 400 th/cmm 195   Neutrophils %      40 - 70 % 61.6   Lymphocyte %      15 - 45 % 25.0   Monocytes %      2 - 10 % 9.0   Eosinophils %      0 - 6 % 3.9   Basophils %      0 - 2 % 0.5   Nucleated Red Blood Cells      <1 /100 WBC 0.0   Absolute Neut #      1800 - 7700 /cmm 3800   Absolute Lymph #      1000 - 4800 /cmm 1600   Absolute Mono #      0 - 800 /cmm 600   Absolute Eos #      0 - 500 /cmm 200   Basophils #      0 - 200 /cmm 0   Sodium      135 - 145 mEq/L 137   Potassium      3.6 - 5.0 mEq/L 4.3   Chloride      101 - 111 mEq/L 104   CO2      21 - 32 mEq/L 24   Anion Gap      4 - 12 9   Glucose      70 - 110 mg/dL 150 (H)   BUN      7 - 20 mg/dL 17   Creatinine      0.70 - 1.30 mg/dL 1.03   Creatinine Clearance       >60   AST      15 - 41 IU/L 27   Alk Phos      41 - 137 IU/L 49   Bilirubin      0.2 - 1.0 mg/dL 0.9   Calcium      8.8 - 10.5 mg/dL 9.2   Albumin      3.5 - 5.0 gm/dL 4.5   Total Protein      6.2 - 8.0 g/dL 7.1   Albumin/Globulin Ratio      1.5 - 2.5 1.7   ALT      10 - 40 IU/L 34   Hemoglobin A1C      4.4 - 6.4 % 8.5 (H)   eAG (mg/dL)      mg/dL 197   TSH      0.490 - 4.67 mcIU/mL 1.231   T4 Free      0.61 - 1.12 ng/dL 0.87   Prostatic Specific Ag      ng/mL <0.01       Subjective:  Patient here to review recent labs. History of prostate cancer, type 2 diabetes, hypertension and retinopathy. Denies any fever, headache, syncope, numbness, chest pain, shortness of breath, abdominal pain or edema. .  reports compliance with medication and diet        Review of Systems   Constitutional: Negative. HENT: Negative. Eyes: Negative. Respiratory: Negative. Cardiovascular: Negative. Gastrointestinal: Negative. Endocrine: Negative. Genitourinary: Negative. Musculoskeletal: Negative. Skin: Negative. Neurological: Negative. Hematological: Negative. Psychiatric/Behavioral: Negative.       Past Medical History:   Diagnosis Date    Cancer [facility])     prostate--had prostate removed May 2017    Diabetes type 2, controlled [facility]     Hypertensive retinopathy       Past Surgical History:   Procedure Laterality Date    BICEPS TENDON REPAIR Right 2015    EYE SURGERY  2018      Left    HERNIA REPAIR  2017    [address] [place] [address] KNEE SURGERY Bilateral     PROSTATECTOMY  2017    Dr Tressia Nyhan     Family History   Problem Relation Age of Onset    Other Mother         alzheimers    Alzheimer's Disease Mother     Cancer Father         prostate     Social History     Tobacco Use    Smoking status: Former Smoker     Packs/day: 0.50     Years: 5.00     Pack years: 2.50     Last attempt to quit: 3/21/2011     Years since quittin.0    Smokeless tobacco: Former User   Substance Use Topics    Alcohol use: Yes     Comment: rarely      Current Outpatient Medications   Medication Sig Dispense Refill    Multiple Vitamins-Minerals (PRESERVISION AREDS PO) Take 1 tablet by mouth 2 times daily (with meals)      SITagliptin (JANUVIA) 100 MG tablet Take 1 tablet by mouth daily 30 tablet 5    atorvastatin (LIPITOR) 20 MG tablet TAKE 1 TABLET BY MOUTH  DAILY 90 tablet 1    metFORMIN (GLUCOPHAGE) 500 MG tablet TAKE 2 TABLETS BY MOUTH IN  THE MORNING AND 1 TABLET IN THE EVENING (Patient taking differently: TAKE 2 TABLETS BY MOUTH BID) 270 tablet 1     No current facility-administered medications for this visit. Allergies   Allergen Reactions    Dye [Iodides] Other (See Comments)     Nausea, alternating hot and cold and syncope. IV only     Health Maintenance   Topic Date Due    Pneumococcal 0-64 years at Risk Vaccine (1 of 1 - PPSV23) 03/21/1961    HIV screen  03/21/1970    DTaP/Tdap/Td vaccine (1 - Tdap) 03/21/1974    Diabetic retinal exam  05/10/2018    Colon Cancer Screen FIT/FOBT  02/14/2019    Shingles Vaccine (2 of 2) 04/04/2019    Diabetic microalbuminuria test  08/15/2019    Lipid screen  08/15/2019    Diabetic foot exam  08/30/2019    A1C test (Diabetic or Prediabetic)  03/02/2020    Flu vaccine  Completed    Hepatitis C screen  Completed         Objective:     Physical Exam   Constitutional: He is oriented to person, place, and time. He appears well-developed and well-nourished. HENT:   Head: Normocephalic. Right Ear: External ear normal.   Left Ear: External ear normal.   Nose: Nose normal.   Mouth/Throat: Oropharynx is clear and moist.   Eyes: Pupils are equal, round, and reactive to light. Conjunctivae and EOM are normal.   Neck: Normal range of motion. Neck supple. No JVD present. No thyromegaly present. Cardiovascular: Normal rate, regular rhythm, normal heart sounds and intact distal pulses. Pulmonary/Chest: Effort normal and breath sounds normal.   Abdominal: Soft. Bowel sounds are normal.   Musculoskeletal: Normal range of motion. Lymphadenopathy:     He has no cervical adenopathy. Neurological: He is alert and oriented to person, place, and time. Skin: Skin is warm. Capillary refill takes 2 to 3 seconds. Psychiatric: He has a normal mood and affect.  His behavior is normal. Judgment and thought content normal.   Nursing note and vitals reviewed. /68 (Site: Right Upper Arm, Position: Sitting)   Pulse 64   Temp 98.4 °F (36.9 °C) (Oral)   Resp 18   Ht 5' 9.25\" (1.759 m)   Wt 264 lb 6.4 oz (119.9 kg)   SpO2 98%   BMI 38.76 kg/m²       Impression/Plan:  1. Type 2 diabetes mellitus without complication, without long-term current use of insulin ([facility] [address]    2. Primary prostate cancer (HCC)    hyperlipidemia  Requested Prescriptions     Signed Prescriptions Disp Refills    SITagliptin (JANUVIA) 100 MG tablet 30 tablet 5     Sig: Take 1 tablet by mouth daily     Orders Placed This Encounter   Procedures    Hemoglobin A1C     Standing Status:   Future     Standing Expiration Date:   3/25/2020    Basic Metabolic Panel     Standing Status:   Future     Standing Expiration Date:   3/25/2020       Patient giveneducational materials - see patient instructions. Discussed use, benefit, and side effects of prescribed medications. All patient questions answered. Pt voiced understanding. Reviewed health maintenance. Patient agreedwith treatment plan. Follow up as directed. Continue medications as prescribed.  Educational information on above diagnosis  provided per AVS.      Electronically signed by ALVARO Puente CNP on 4/3/2019 at 10:25 AM

## 2019-06-03 ENCOUNTER — OFFICE VISIT (OUTPATIENT)
Dept: FAMILY MEDICINE CLINIC | Age: 64
End: 2019-06-03
Payer: COMMERCIAL

## 2019-06-03 VITALS
HEART RATE: 72 BPM | WEIGHT: 259 LBS | TEMPERATURE: 98.5 F | RESPIRATION RATE: 16 BRPM | SYSTOLIC BLOOD PRESSURE: 126 MMHG | DIASTOLIC BLOOD PRESSURE: 86 MMHG | HEIGHT: 70 IN | BODY MASS INDEX: 37.08 KG/M2

## 2019-06-03 DIAGNOSIS — J06.9 URI WITH COUGH AND CONGESTION: Primary | ICD-10-CM

## 2019-06-03 PROCEDURE — 99213 OFFICE O/P EST LOW 20 MIN: CPT | Performed by: NURSE PRACTITIONER

## 2019-06-03 RX ORDER — BENZONATATE 200 MG/1
200 CAPSULE ORAL 3 TIMES DAILY PRN
Qty: 21 CAPSULE | Refills: 0 | Status: SHIPPED | OUTPATIENT
Start: 2019-06-03 | End: 2019-06-10

## 2019-06-03 RX ORDER — AMOXICILLIN AND CLAVULANATE POTASSIUM 875; 125 MG/1; MG/1
1 TABLET, FILM COATED ORAL 2 TIMES DAILY
Qty: 20 TABLET | Refills: 0 | Status: SHIPPED | OUTPATIENT
Start: 2019-06-03 | End: 2019-06-13

## 2019-06-03 ASSESSMENT — ENCOUNTER SYMPTOMS
COLOR CHANGE: 0
SHORTNESS OF BREATH: 0
EYE DISCHARGE: 0
CONSTIPATION: 0
NAUSEA: 0
WHEEZING: 1
BACK PAIN: 0
EYE PAIN: 0
VOMITING: 0
ABDOMINAL PAIN: 0
STRIDOR: 0
SORE THROAT: 1
DIARRHEA: 0
RHINORRHEA: 0
COUGH: 1

## 2019-06-03 NOTE — PROGRESS NOTES
[address] [address]  Dept: [phone]  Dept Fax: [phone]: [phone]     Visit Date:  6/3/2019      Patient:  Caleb Huston  YOB: 1955    HPI:     Chief Complaint   Patient presents with    URI     Chest congestion, runny nose x 2 weeks. Cough   This is a new problem. The current episode started 1 to 4 weeks ago. The problem has been unchanged. The problem occurs every few minutes. The cough is non-productive. Associated symptoms include nasal congestion, a sore throat and wheezing. Pertinent negatives include no chest pain, chills, ear pain, fever, headaches, myalgias, rash, rhinorrhea or shortness of breath. He has tried OTC cough suppressant for the symptoms. The treatment provided mild relief. His past medical history is significant for environmental allergies. There is no history of asthma or COPD. Medications    Current Outpatient Medications:     amoxicillin-clavulanate (AUGMENTIN) 875-125 MG per tablet, Take 1 tablet by mouth 2 times daily for 10 days, Disp: 20 tablet, Rfl: 0    benzonatate (TESSALON) 200 MG capsule, Take 1 capsule by mouth 3 times daily as needed for Cough, Disp: 21 capsule, Rfl: 0    Multiple Vitamins-Minerals (PRESERVISION AREDS PO), Take 1 tablet by mouth 2 times daily (with meals), Disp: , Rfl:     SITagliptin (JANUVIA) 100 MG tablet, Take 1 tablet by mouth daily, Disp: 30 tablet, Rfl: 5    atorvastatin (LIPITOR) 20 MG tablet, TAKE 1 TABLET BY MOUTH  DAILY, Disp: 90 tablet, Rfl: 1    metFORMIN (GLUCOPHAGE) 500 MG tablet, TAKE 2 TABLETS BY MOUTH IN  THE MORNING AND 1 TABLET IN THE EVENING (Patient taking differently: TAKE 2 TABLETS BY MOUTH BID), Disp: 270 tablet, Rfl: 1    The patient is allergic to dye [iodides].     Past Medical History  Charlie Choi  has a past medical history of Cancer [facility]), Diabetes type 2, controlled ([address] and Hypertensive retinopathy. Subjective:      Review of Systems   Constitutional: Negative for activity change, appetite change, chills, fatigue and fever. HENT: Positive for sore throat. Negative for congestion, ear pain and rhinorrhea. Eyes: Negative for pain and discharge. Respiratory: Positive for cough and wheezing. Negative for shortness of breath and stridor. Cardiovascular: Negative for chest pain. Gastrointestinal: Negative for abdominal pain, constipation, diarrhea, nausea and vomiting. Genitourinary: Negative for dysuria, flank pain and frequency. Musculoskeletal: Negative for arthralgias, back pain, myalgias and neck pain. Skin: Negative for color change and rash. Allergic/Immunologic: Positive for environmental allergies. Negative for immunocompromised state. Neurological: Negative for dizziness, weakness and headaches. Psychiatric/Behavioral: Negative. Objective:     /86   Pulse 72   Temp 98.5 °F (36.9 °C) (Oral)   Resp 16   Ht 5' 10\" (1.778 m)   Wt 259 lb (117.5 kg)   BMI 37.16 kg/m²     Physical Exam   Constitutional: He is oriented to person, place, and time. He appears well-developed and well-nourished. He is cooperative. No distress. HENT:   Right Ear: Hearing, tympanic membrane, external ear and ear canal normal.   Left Ear: Hearing, tympanic membrane, external ear and ear canal normal.   Nose: Nose normal. No mucosal edema or rhinorrhea. Mouth/Throat: Uvula is midline, oropharynx is clear and moist and mucous membranes are normal. No uvula swelling. No oropharyngeal exudate, posterior oropharyngeal edema or posterior oropharyngeal erythema. Eyes: Pupils are equal, round, and reactive to light. Conjunctivae, EOM and lids are normal. Right eye exhibits no discharge. Left eye exhibits no discharge. Neck: Normal range of motion and full passive range of motion without pain. Neck supple. No muscular tenderness present.    Cardiovascular: Normal rate, regular rhythm and normal heart sounds. Exam reveals no friction rub. No murmur heard. Pulmonary/Chest: Effort normal and breath sounds normal. No accessory muscle usage. No respiratory distress. He has no wheezes. He has no rales. Lymphadenopathy:     He has no cervical adenopathy. Neurological: He is alert and oriented to person, place, and time. He has normal strength. No cranial nerve deficit. Coordination and gait normal. GCS eye subscore is 4. GCS verbal subscore is 5. GCS motor subscore is 6. Skin: Skin is warm and dry. He is not diaphoretic. Psychiatric: He has a normal mood and affect. His speech is normal and behavior is normal. Judgment and thought content normal. Cognition and memory are normal.   Nursing note and vitals reviewed. Assessment/Plan:      Poornima Mena was seen today for uri. Diagnoses and all orders for this visit:    URI with cough and congestion  -     amoxicillin-clavulanate (AUGMENTIN) 875-125 MG per tablet; Take 1 tablet by mouth 2 times daily for 10 days  -     benzonatate (TESSALON) 200 MG capsule; Take 1 capsule by mouth 3 times daily as needed for Cough        No follow-ups on file. Patient instructions given milan.         Electronically signed by ALVARO Salter CNP on 6/3/2019 at 3:48 PM

## 2019-06-03 NOTE — PATIENT INSTRUCTIONS
Patient Education        Upper Respiratory Infection (Cold): Care Instructions  Your Care Instructions    An upper respiratory infection, or URI, is an infection of the nose, sinuses, or throat. URIs are spread by coughs, sneezes, and direct contact. The common cold is the most frequent kind of URI. The flu and sinus infections are other kinds of URIs. Almost all URIs are caused by viruses. Antibiotics won't cure them. But you can treat most infections with home care. This may include drinking lots of fluids and taking over-the-counter pain medicine. You will probably feel better in 4 to 10 days. The doctor has checked you carefully, but problems can develop later. If you notice any problems or new symptoms, get medical treatment right away. Follow-up care is a key part of your treatment and safety. Be sure to make and go to all appointments, and call your doctor if you are having problems. It's also a good idea to know your test results and keep a list of the medicines you take. How can you care for yourself at home? · To prevent dehydration, drink plenty of fluids, enough so that your urine is light yellow or clear like water. Choose water and other caffeine-free clear liquids until you feel better. If you have kidney, heart, or liver disease and have to limit fluids, talk with your doctor before you increase the amount of fluids you drink. · Take an over-the-counter pain medicine, such as acetaminophen (Tylenol), ibuprofen (Advil, Motrin), or naproxen (Aleve). Read and follow all instructions on the label. · Before you use cough and cold medicines, check the label. These medicines may not be safe for young children or for people with certain health problems. · Be careful when taking over-the-counter cold or flu medicines and Tylenol at the same time. Many of these medicines have acetaminophen, which is Tylenol. Read the labels to make sure that you are not taking more than the recommended dose.  Too much acetaminophen (Tylenol) can be harmful. · Get plenty of rest.  · Do not smoke or allow others to smoke around you. If you need help quitting, talk to your doctor about stop-smoking programs and medicines. These can increase your chances of quitting for good. When should you call for help? Call 911 anytime you think you may need emergency care. For example, call if:    · You have severe trouble breathing.    Call your doctor now or seek immediate medical care if:    · You seem to be getting much sicker.     · You have new or worse trouble breathing.     · You have a new or higher fever.     · You have a new rash.    Watch closely for changes in your health, and be sure to contact your doctor if:    · You have a new symptom, such as a sore throat, an earache, or sinus pain.     · You cough more deeply or more often, especially if you notice more mucus or a change in the color of your mucus.     · You do not get better as expected. Where can you learn more? Go to https://Solvesting.YCharts. org and sign in to your "[employer]" account. Enter E113 in the Autoniq box to learn more about \"Upper Respiratory Infection (Cold): Care Instructions. \"     If you do not have an account, please click on the \"Sign Up Now\" link. Current as of: September 5, 2018  Content Version: 12.0  © 8248-8597 [employer]wise, Incorporated. Care instructions adapted under license by [facility] ([facility]). If you have questions about a medical condition or this instruction, always ask your healthcare professional. [place] [address] any warranty or liability for your use of this information.

## 2019-06-27 NOTE — TELEPHONE ENCOUNTER
Last seen JR 3/26/19. Last A1c 3/2/19. Last FLP 8/15/18. BMP, A1c was ordered 3/26/19 to be repeated around 6/24/19. He is due for a FLP,OK to order. Appt desk shows he is scheduled with Collin Batista as of NP on 8/5/19.

## 2019-06-28 RX ORDER — ATORVASTATIN CALCIUM 20 MG/1
20 TABLET, FILM COATED ORAL DAILY
Qty: 90 TABLET | Refills: 1 | Status: SHIPPED | OUTPATIENT
Start: 2019-06-28 | End: 2019-08-05

## 2019-08-05 ENCOUNTER — OFFICE VISIT (OUTPATIENT)
Dept: INTERNAL MEDICINE CLINIC | Age: 64
End: 2019-08-05
Payer: COMMERCIAL

## 2019-08-05 VITALS
HEART RATE: 60 BPM | WEIGHT: 252 LBS | DIASTOLIC BLOOD PRESSURE: 84 MMHG | SYSTOLIC BLOOD PRESSURE: 120 MMHG | BODY MASS INDEX: 37.33 KG/M2 | HEIGHT: 69 IN

## 2019-08-05 DIAGNOSIS — E11.9 TYPE 2 DIABETES MELLITUS WITHOUT COMPLICATION, WITHOUT LONG-TERM CURRENT USE OF INSULIN (HCC): Primary | ICD-10-CM

## 2019-08-05 DIAGNOSIS — E78.00 PURE HYPERCHOLESTEROLEMIA: ICD-10-CM

## 2019-08-05 LAB — HBA1C MFR BLD: 6.5 % (ref 4.3–5.7)

## 2019-08-05 PROCEDURE — 99214 OFFICE O/P EST MOD 30 MIN: CPT | Performed by: INTERNAL MEDICINE

## 2019-08-05 PROCEDURE — 93000 ELECTROCARDIOGRAM COMPLETE: CPT | Performed by: INTERNAL MEDICINE

## 2019-08-05 PROCEDURE — 83036 HEMOGLOBIN GLYCOSYLATED A1C: CPT | Performed by: INTERNAL MEDICINE

## 2019-08-17 LAB
ANION GAP SERPL CALCULATED.3IONS-SCNC: 6 MMOL/L (ref 4–12)
BUN BLDV-MCNC: 15 MG/DL (ref 7–20)
CALCIUM SERPL-MCNC: 9.5 MG/DL (ref 8.8–10.5)
CHLORIDE BLD-SCNC: 106 MEQ/L (ref 101–111)
CHOLESTEROL/HDL RELATIVE RISK: 4.5 (ref 4–5)
CHOLESTEROL: 158 MG/DL
CO2: 27 MEQ/L (ref 21–32)
CREAT SERPL-MCNC: 0.94 MG/DL (ref 0.6–1.3)
CREATININE CLEARANCE: >60
CREATININE, RANDOM URINE: 199.4 MG/DL
DIRECT-LDL / HDL RISK: 3.2
GLUCOSE: 124 MG/DL (ref 70–110)
HDLC SERPL-MCNC: 35 MG/DL
LDL CHOLESTEROL DIRECT: 113 MG/DL
MICROALBUMIN UR-MCNC: 18.6 MG/L
MICROALBUMIN/CREAT UR-RTO: 9.3 MG/GM (ref 0–30)
POTASSIUM SERPL-SCNC: 4.5 MEQ/L (ref 3.6–5)
SODIUM BLD-SCNC: 139 MEQ/L (ref 135–145)
TRIGL SERPL-MCNC: 94 MG/DL
VLDLC SERPL CALC-MCNC: 18 MG/DL

## 2019-08-20 ENCOUNTER — TELEPHONE (OUTPATIENT)
Dept: INTERNAL MEDICINE CLINIC | Age: 64
End: 2019-08-20

## 2019-08-20 DIAGNOSIS — E78.00 PURE HYPERCHOLESTEROLEMIA: Primary | ICD-10-CM

## 2019-08-20 RX ORDER — ROSUVASTATIN CALCIUM 10 MG/1
10 TABLET, COATED ORAL NIGHTLY
Qty: 90 TABLET | Refills: 1 | OUTPATIENT
Start: 2019-08-20 | End: 2019-10-01 | Stop reason: SDUPTHER

## 2019-08-20 NOTE — TELEPHONE ENCOUNTER
----- Message from Sallyann Favre, MD sent at 8/18/2019  4:32 PM EDT -----  Let him know that LDL is >100 - need to restart a statin - Crestor 10 mg daily. Repeat LDL and ALT in 2-3 months. Change next visit to [facility] in 6 weeks.

## 2019-10-01 ENCOUNTER — OFFICE VISIT (OUTPATIENT)
Dept: INTERNAL MEDICINE CLINIC | Age: 64
End: 2019-10-01
Payer: COMMERCIAL

## 2019-10-01 VITALS
BODY MASS INDEX: 37.99 KG/M2 | SYSTOLIC BLOOD PRESSURE: 134 MMHG | DIASTOLIC BLOOD PRESSURE: 72 MMHG | WEIGHT: 256.5 LBS | HEIGHT: 69 IN | HEART RATE: 60 BPM

## 2019-10-01 DIAGNOSIS — R06.02 SOB (SHORTNESS OF BREATH) ON EXERTION: ICD-10-CM

## 2019-10-01 DIAGNOSIS — E78.00 PURE HYPERCHOLESTEROLEMIA: Primary | ICD-10-CM

## 2019-10-01 DIAGNOSIS — E11.9 TYPE 2 DIABETES MELLITUS WITHOUT COMPLICATION, WITHOUT LONG-TERM CURRENT USE OF INSULIN (HCC): ICD-10-CM

## 2019-10-01 PROCEDURE — 99213 OFFICE O/P EST LOW 20 MIN: CPT | Performed by: INTERNAL MEDICINE

## 2019-10-01 RX ORDER — ALBUTEROL SULFATE 90 UG/1
2 AEROSOL, METERED RESPIRATORY (INHALATION) EVERY 4 HOURS PRN
Qty: 1 INHALER | Refills: 1 | Status: SHIPPED | OUTPATIENT
Start: 2019-10-01 | End: 2020-07-16

## 2019-10-01 RX ORDER — ROSUVASTATIN CALCIUM 10 MG/1
10 TABLET, COATED ORAL NIGHTLY
Qty: 90 TABLET | Refills: 1 | Status: SHIPPED | OUTPATIENT
Start: 2019-10-01 | End: 2020-03-20

## 2019-10-24 ENCOUNTER — HOSPITAL ENCOUNTER (OUTPATIENT)
Dept: PULMONOLOGY | Age: 64
Discharge: HOME | End: 2019-10-24
Payer: COMMERCIAL

## 2019-10-24 DIAGNOSIS — R06.02 SOB (SHORTNESS OF BREATH) ON EXERTION: ICD-10-CM

## 2019-10-24 PROCEDURE — 94729 DIFFUSING CAPACITY: CPT

## 2019-10-24 PROCEDURE — 94060 EVALUATION OF WHEEZING: CPT

## 2019-10-24 PROCEDURE — 94726 PLETHYSMOGRAPHY LUNG VOLUMES: CPT

## 2019-11-04 ENCOUNTER — TELEPHONE (OUTPATIENT)
Dept: INTERNAL MEDICINE CLINIC | Age: 64
End: 2019-11-04

## 2019-11-11 ENCOUNTER — HOSPITAL ENCOUNTER (OUTPATIENT)
Age: 64
Discharge: HOME | End: 2019-11-11
Payer: COMMERCIAL

## 2019-11-11 DIAGNOSIS — E78.00 PURE HYPERCHOLESTEROLEMIA: ICD-10-CM

## 2019-11-11 LAB
ALT SERPL-CCNC: 29 U/L (ref 11–66)
LDL CHOLESTEROL DIRECT: 72 MG/DL

## 2019-11-11 PROCEDURE — 36415 COLL VENOUS BLD VENIPUNCTURE: CPT

## 2019-11-11 PROCEDURE — 84460 ALANINE AMINO (ALT) (SGPT): CPT

## 2019-11-11 PROCEDURE — 83721 ASSAY OF BLOOD LIPOPROTEIN: CPT

## 2019-12-12 ENCOUNTER — NURSE ONLY (OUTPATIENT)
Dept: LAB | Age: 64
End: 2019-12-12

## 2019-12-12 ENCOUNTER — OFFICE VISIT (OUTPATIENT)
Dept: INTERNAL MEDICINE CLINIC | Age: 64
End: 2019-12-12
Payer: COMMERCIAL

## 2019-12-12 VITALS
HEART RATE: 60 BPM | DIASTOLIC BLOOD PRESSURE: 72 MMHG | SYSTOLIC BLOOD PRESSURE: 124 MMHG | HEIGHT: 69 IN | WEIGHT: 266.2 LBS | BODY MASS INDEX: 39.43 KG/M2

## 2019-12-12 DIAGNOSIS — E11.9 TYPE 2 DIABETES MELLITUS WITHOUT COMPLICATION, WITHOUT LONG-TERM CURRENT USE OF INSULIN (HCC): ICD-10-CM

## 2019-12-12 DIAGNOSIS — H26.9 CATARACT OF LEFT EYE, UNSPECIFIED CATARACT TYPE: ICD-10-CM

## 2019-12-12 DIAGNOSIS — I49.3 ASYMPTOMATIC PVCS: ICD-10-CM

## 2019-12-12 DIAGNOSIS — E78.00 PURE HYPERCHOLESTEROLEMIA: ICD-10-CM

## 2019-12-12 DIAGNOSIS — Z01.818 PREOP EXAM FOR INTERNAL MEDICINE: Primary | ICD-10-CM

## 2019-12-12 LAB
ANION GAP SERPL CALCULATED.3IONS-SCNC: 12 MEQ/L (ref 8–16)
BASOPHILS # BLD: 0.6 %
BASOPHILS ABSOLUTE: 0 THOU/MM3 (ref 0–0.1)
BUN BLDV-MCNC: 15 MG/DL (ref 7–22)
CALCIUM SERPL-MCNC: 9.4 MG/DL (ref 8.5–10.5)
CHLORIDE BLD-SCNC: 102 MEQ/L (ref 98–111)
CO2: 26 MEQ/L (ref 23–33)
CREAT SERPL-MCNC: 0.7 MG/DL (ref 0.4–1.2)
EOSINOPHIL # BLD: 4.2 %
EOSINOPHILS ABSOLUTE: 0.3 THOU/MM3 (ref 0–0.4)
ERYTHROCYTE [DISTWIDTH] IN BLOOD BY AUTOMATED COUNT: 12.9 % (ref 11.5–14.5)
ERYTHROCYTE [DISTWIDTH] IN BLOOD BY AUTOMATED COUNT: 42.1 FL (ref 35–45)
GFR SERPL CREATININE-BSD FRML MDRD: > 90 ML/MIN/1.73M2
GLUCOSE BLD-MCNC: 215 MG/DL (ref 70–108)
HBA1C MFR BLD: 6.7 % (ref 4.3–5.7)
HCT VFR BLD CALC: 45.8 % (ref 42–52)
HEMOGLOBIN: 14.8 GM/DL (ref 14–18)
IMMATURE GRANS (ABS): 0.02 THOU/MM3 (ref 0–0.07)
IMMATURE GRANULOCYTES: 0.3 %
LYMPHOCYTES # BLD: 26.7 %
LYMPHOCYTES ABSOLUTE: 1.9 THOU/MM3 (ref 1–4.8)
MAGNESIUM: 2 MG/DL (ref 1.6–2.4)
MCH RBC QN AUTO: 29.2 PG (ref 26–33)
MCHC RBC AUTO-ENTMCNC: 32.3 GM/DL (ref 32.2–35.5)
MCV RBC AUTO: 90.3 FL (ref 80–94)
MONOCYTES # BLD: 9.1 %
MONOCYTES ABSOLUTE: 0.7 THOU/MM3 (ref 0.4–1.3)
NUCLEATED RED BLOOD CELLS: 0 /100 WBC
PLATELET # BLD: 190 THOU/MM3 (ref 130–400)
PMV BLD AUTO: 11.7 FL (ref 9.4–12.4)
POTASSIUM SERPL-SCNC: 4.7 MEQ/L (ref 3.5–5.2)
RBC # BLD: 5.07 MILL/MM3 (ref 4.7–6.1)
SEG NEUTROPHILS: 59.1 %
SEGMENTED NEUTROPHILS ABSOLUTE COUNT: 4.3 THOU/MM3 (ref 1.8–7.7)
SODIUM BLD-SCNC: 140 MEQ/L (ref 135–145)
TSH SERPL DL<=0.05 MIU/L-ACNC: 1.56 UIU/ML (ref 0.4–4.2)
WBC # BLD: 7.2 THOU/MM3 (ref 4.8–10.8)

## 2019-12-12 PROCEDURE — 93000 ELECTROCARDIOGRAM COMPLETE: CPT | Performed by: INTERNAL MEDICINE

## 2019-12-12 PROCEDURE — 83036 HEMOGLOBIN GLYCOSYLATED A1C: CPT | Performed by: INTERNAL MEDICINE

## 2019-12-12 PROCEDURE — 99244 OFF/OP CNSLTJ NEW/EST MOD 40: CPT | Performed by: INTERNAL MEDICINE

## 2019-12-12 RX ORDER — ASPIRIN 81 MG/1
81 TABLET ORAL DAILY
COMMUNITY
End: 2021-11-04

## 2020-03-20 RX ORDER — ROSUVASTATIN CALCIUM 10 MG/1
TABLET, COATED ORAL
Qty: 90 TABLET | Refills: 1 | Status: SHIPPED | OUTPATIENT
Start: 2020-03-20 | End: 2020-07-16 | Stop reason: SDUPTHER

## 2020-03-31 PROBLEM — Z85.46 H/O PROSTATE CANCER: Status: ACTIVE | Noted: 2017-06-26

## 2020-03-31 PROBLEM — C61 PRIMARY PROSTATE CANCER (HCC): Status: RESOLVED | Noted: 2017-03-21 | Resolved: 2020-03-31

## 2020-04-27 ENCOUNTER — TELEPHONE (OUTPATIENT)
Dept: INTERNAL MEDICINE CLINIC | Age: 65
End: 2020-04-27

## 2020-07-16 ENCOUNTER — NURSE ONLY (OUTPATIENT)
Dept: LAB | Age: 65
End: 2020-07-16

## 2020-07-16 ENCOUNTER — OFFICE VISIT (OUTPATIENT)
Dept: INTERNAL MEDICINE CLINIC | Age: 65
End: 2020-07-16
Payer: COMMERCIAL

## 2020-07-16 VITALS
BODY MASS INDEX: 37.41 KG/M2 | TEMPERATURE: 96.4 F | HEART RATE: 60 BPM | SYSTOLIC BLOOD PRESSURE: 120 MMHG | DIASTOLIC BLOOD PRESSURE: 80 MMHG | WEIGHT: 252.6 LBS | HEIGHT: 69 IN

## 2020-07-16 LAB
ANION GAP SERPL CALCULATED.3IONS-SCNC: 13 MEQ/L (ref 8–16)
BUN BLDV-MCNC: 18 MG/DL (ref 7–22)
CALCIUM SERPL-MCNC: 9.8 MG/DL (ref 8.5–10.5)
CHLORIDE BLD-SCNC: 101 MEQ/L (ref 98–111)
CO2: 26 MEQ/L (ref 23–33)
CREAT SERPL-MCNC: 0.8 MG/DL (ref 0.4–1.2)
CREATININE, URINE: 205.6 MG/DL
GFR SERPL CREATININE-BSD FRML MDRD: > 90 ML/MIN/1.73M2
GLUCOSE BLD-MCNC: 124 MG/DL (ref 70–108)
HBA1C MFR BLD: 7.1 % (ref 4.3–5.7)
MICROALBUMIN UR-MCNC: < 1.2 MG/DL
MICROALBUMIN/CREAT UR-RTO: 6 MG/G (ref 0–30)
POTASSIUM SERPL-SCNC: 4.4 MEQ/L (ref 3.5–5.2)
PROSTATE SPECIFIC ANTIGEN: < 0.02 NG/ML (ref 0–1)
SODIUM BLD-SCNC: 140 MEQ/L (ref 135–145)

## 2020-07-16 PROCEDURE — 99213 OFFICE O/P EST LOW 20 MIN: CPT | Performed by: STUDENT IN AN ORGANIZED HEALTH CARE EDUCATION/TRAINING PROGRAM

## 2020-07-16 PROCEDURE — 83036 HEMOGLOBIN GLYCOSYLATED A1C: CPT | Performed by: STUDENT IN AN ORGANIZED HEALTH CARE EDUCATION/TRAINING PROGRAM

## 2020-07-16 PROCEDURE — 3051F HG A1C>EQUAL 7.0%<8.0%: CPT | Performed by: STUDENT IN AN ORGANIZED HEALTH CARE EDUCATION/TRAINING PROGRAM

## 2020-07-16 RX ORDER — ROSUVASTATIN CALCIUM 10 MG/1
TABLET, COATED ORAL
Qty: 90 TABLET | Refills: 1 | Status: SHIPPED | OUTPATIENT
Start: 2020-07-16 | End: 2021-01-07 | Stop reason: SDUPTHER

## 2020-07-16 RX ORDER — LISINOPRIL 5 MG/1
5 TABLET ORAL DAILY
Qty: 90 TABLET | Refills: 1 | Status: SHIPPED | OUTPATIENT
Start: 2020-07-16 | End: 2020-10-03 | Stop reason: SINTOL

## 2020-07-16 ASSESSMENT — PATIENT HEALTH QUESTIONNAIRE - PHQ9
SUM OF ALL RESPONSES TO PHQ9 QUESTIONS 1 & 2: 0
1. LITTLE INTEREST OR PLEASURE IN DOING THINGS: 0
SUM OF ALL RESPONSES TO PHQ QUESTIONS 1-9: 0
SUM OF ALL RESPONSES TO PHQ QUESTIONS 1-9: 0
2. FEELING DOWN, DEPRESSED OR HOPELESS: 0

## 2020-07-16 ASSESSMENT — ENCOUNTER SYMPTOMS
BACK PAIN: 0
CHEST TIGHTNESS: 0
ABDOMINAL PAIN: 0
NAUSEA: 0
DIARRHEA: 0
COUGH: 0
COLOR CHANGE: 0
CHOKING: 0
EYE REDNESS: 1
BLOOD IN STOOL: 0
VOMITING: 0
SHORTNESS OF BREATH: 0
ABDOMINAL DISTENTION: 0
CONSTIPATION: 0

## 2020-07-16 NOTE — PATIENT INSTRUCTIONS
Start taking Lisinopril once a day in the morning   Change diet - limit ice cream, cookies, junk food

## 2020-07-16 NOTE — PROGRESS NOTES
1955    Chief Complaint   Patient presents with    Diabetes     6 month follow up    Hyperlipidemia    Other     pvc    Other     bruising more easily than used to       Pt is a 72 y.o. male who presents for 2 month follow up visit to establish PCP. New patient to me but followed by Nickolas MITCHELL previously. DM - dx 2015 - HgA1c 7.1% 7/16/20 - well controlled, on metformin. He tried Januvia but reportedly didn't make a difference. He attributes this increase in A1c to it being summer and not eating as healthy as he should be. He is up to date with eye exam with Dr. Sue Bergman - need note. Had rentinopathy but listed as hypertensive and no history of hypertension? LDL Direct 11/11/19 is 72- Continue with Crestor. Foot lesions - fungal nail on right 1st toe, no neuropathy. Has not seen podiatrist.    No autonomic dysfunction. Normal kidney function 12/2019. Normal microalbumin 8/2019. Not on ACEI. Educated to restart ASA 81 mg daily. Hyperlipidemia - he stopped Lipitor in the last one month and started fish oil as someone told him it was bad for his organs. He did report muscle aches that resolved with coming off medication. Will recheck lipid level in a month. Educated regarding how different statins can affect patients differently - he will consider a different statin. LDL on 11/2019 is 72 while on Crestor. Chronic cough - x a year. Yellowish/white sputum, occasionally brown. No smoking since 2011. Smoked 10 years a pack a day. Normal CXR 3/2019. No fluid overload. Worse in am.  Positive occasional wheeze, no SOB. No sneeze, itchy watery eyes. No ACEI, no GERD. Previously treated with Augmentin, and Tessalon 6/2019, Ceftin 2/2019 - didn't help. Mostly likely allergies or COPD. Zyrtec did not help. Patient had Full PFT study with bronchodilator on 10/25/2019 - all results within normal limits    Tremors - chronic - s/p imaging. Not affecting ADLs.   Intention tremor. Retinopathy - following with Dr. Audrey Haq. Possible glaucoma - no treatment yet. Diabetes Mellitus -  Last HgA1c 7.1 - controlled, on Metformin 500 mg BID  Patient checks FSBS 3 times a day. FSBS ranges 120-200. Did not bring glucose logs. Most recent eye exam with Dr. Ryan Gonzalez MD at the [facility] on 2/12/20. Noted negative DM retinopathy. Patient reminded to have eye exam done yearly. Normal renal function, eGFR > 90, normal microalbumin on 8/17/20. Patient has fungal infection on left toe. Foot exam done today 7/16/20. Denies neuropathy. No autonomic dysfunction. LDL 72    Patient is on an Aspirin 81 mg qd, Lisinopril 5 mg qd and Crestor 10 mg qd. Past Medical History:   Diagnosis Date    Benign essential tremor     Cough     Diabetes type 2, controlled ([address]     Hyperlipidemia     Hypertensive retinopathy     most recent eye exam - no DM retinopathy and no mention of hypertensive retinopathy.     Primary prostate [facility] ([facility] 3/21/2017    Prostate cancer ([facility]     prostate--had prostate removed May 2017       Past Surgical History:   Procedure Laterality Date    BICEPS TENDON REPAIR Right 2015    EYE SURGERY  07/31/2018      Left    HERNIA REPA[place]  05/02/2017    [place]    PROSTATECTOMY  05/2017    Dr Anibal IBRAHIM [address] Bilateral 2011       Current Outpatient Medications   Medication Sig Dispense Refill    metFORMIN (GLUCOPHAGE) 500 MG tablet Take 2 tablets by mouth 2 times daily (with meals) 360 tablet 1    rosuvastatin (CRESTOR) 10 MG tablet TAKE 1 TABLET BY MOUTH  NIGHTLY 90 tablet 1    lisinopril (PRINIVIL;ZESTRIL) 5 MG tablet Take 1 tablet by mouth daily 90 tablet 1    aspirin EC 81 MG EC tablet Take 81 mg by mouth daily      Multiple Vitamins-Minerals (PRESERVISION AREDS PO) Take 1 tablet by mouth 2 times daily (with meals)       No current facility-administered medications for this weight change. HENT: Negative for congestion, ear discharge, ear pain, hearing loss, nosebleeds and postnasal drip. Eyes: Positive for redness. Respiratory: Negative for cough, choking, chest tightness and shortness of breath. Cardiovascular: Negative for chest pain, palpitations and leg swelling. Gastrointestinal: Negative for abdominal distention, abdominal pain, blood in stool, constipation, diarrhea, nausea and vomiting. Endocrine: Negative for cold intolerance, heat intolerance, polydipsia, polyphagia and polyuria. Genitourinary: Negative for difficulty urinating and flank pain. Musculoskeletal: Negative for back pain. Skin: Negative for color change, pallor, rash and wound. Allergic/Immunologic: Negative for immunocompromised state. Neurological: Positive for tremors. Negative for dizziness, light-headedness and headaches. Hematological: Negative for adenopathy. Does not bruise/bleed easily. Psychiatric/Behavioral: Negative for behavioral problems, confusion and dysphoric mood. Blood pressure 120/80, pulse 60, temperature 96.4 °F (35.8 °C), height 5' 9.25\" (1.759 m), weight 252 lb 9.6 oz (114.6 kg). Physical Examination: General appearance -alert, well appearing, and in no distress  Mental status - alert, oriented to person, place, and time  Head - atraumatic, normocephalic  Eyes - pupils equal and reactive to light, extraocular muscles intact. Left eye redness  Ears - external ears are normal, hearing is grossly normal  Mouth - oral pharynx clear, mucous membranes moist  Neck - supple, no significant adenopathy  Chest - clear to auscultation, no wheezes, rales or rhonchi, symmetric air entry  Heart - normal rate, regular rhythm, normal S1, S2, no murmurs, rubs, clicks or gallops  Abdomen -soft, nontender, nondistended  Neurological - alert, oriented, cranial nerves II-XII intact, motor and sensation are grossly intact bilateral upper and lower extremities.   Extremities -

## 2020-08-16 NOTE — PROGRESS NOTES
1955    Chief Complaint   Patient presents with    Diabetes     6 month follow up    Hyperlipidemia    Other     pvc    Other     bruising more easily than used to       Pt is a 72 y.o. male who presents for 6 month follow up visit. DM - dx 2015 - HgA1c 8/2019 6.5 - 7.1 7/2020 controlled, on metformin. He tried Januvia but reportedly didn't make a difference. He is to have eye exam with Dr. Vy Ahuja in next week or so. Had rentinopathy but listed as hypertensive and no history of hypertension?  ->72 after started Crestor - now at goal.    Foot lesions - fungal nail on right 1st toe, no neuropathy, no foot lesions. No autonomic dysfunction. Normal kidney function 12/2019. Normal microalbumin 8/2019. Not on ACEI. Will repeat labs now. To check FSBS daily. Hyperlipidemia - he stopped Lipitor and started fish oil as someone told him it was bad for his organs. He did report muscle aches that resolved with coming off medication. LDL was > 100 - started Crestor. LDL at goal at 72 11/2019. History of prostate cancer - due for PSA now. Chronic cough - x a year. Yellowish/white sputum, occasionally brown. No smoking since 2011. Smoked 10 years a pack a day. Normal CXR 3/2019. No fluid overload. Worse in am.  Positive occasional wheeze, no SOB. No sneeze, itchy watery eyes. No ACEI, no GERD. Previously treated with Augmentin, and Tessalon 6/2019, Ceftin 2/2019 - didn't help. Mostly likely allergies or COPD. Tried Zyrtec 10 mg daily for at least a month. Ordered PFT - normal 10/2019. Tremors - chronic - s/p imaging. Not affecting ADLs. Intention tremor. Possible glaucoma - no treatment yet. Frequent PVC on previous EKG - normal potassium and magnesium - asymptomatic.     Past Medical History:   Diagnosis Date    Benign essential tremor     Cough     Diabetes type 2, controlled (HCC)     Hyperlipidemia     Hypertensive retinopathy     most recent eye exam - no DM retinopathy and no mention of hypertensive retinopathy.  [facility] ([facility] 3/21/2017    Prostate cancer ([facility]     prostate--had prostate removed May 2017         Current Outpatient Medications   Medication Sig Dispense Refill    metFORMIN (GLUCOPHAGE) 500 MG tablet Take 2 tablets by mouth 2 times daily (with meals) 360 tablet 1    rosuvastatin (CRESTOR) 10 MG tablet TAKE 1 TABLET BY MOUTH  NIGHTLY 90 tablet 1    lisinopril (PRINIVIL;ZESTRIL) 5 MG tablet Take 1 tablet by mouth daily 90 tablet 1    aspirin EC 81 MG EC tablet Take 81 mg by mouth daily      Multiple Vitamins-Minerals (PRESERVISION AREDS PO) Take 1 tablet by mouth 2 times daily (with meals)       No current facility-administered medications for this visit. Allergies   Allergen Reactions    Dye [Iodides] Other (See Comments)     Nausea, alternating hot and cold and syncope.   IV only    Lipitor [Atorvastatin Calcium]      Myalgia         Social History     Socioeconomic History    Marital status:      Spouse name: Not on file    Number of children: Not on file    Years of education: Not on file    Highest education level: Not on file   Occupational History    Not on file   Social Needs    Financial resource strain: Not on file    Food insecurity     Worry: Not on file     Inability: Not on file    Transportation needs     Medical: Not on file     Non-medical: Not on file   Tobacco Use    Smoking status: Former Smoker     Packs/day: 1.00     Years: 10.00     Pack years: 10.00     Last attempt to quit: 3/21/2011     Years since quittin.4    Smokeless tobacco: Former User   Substance and Sexual Activity    Alcohol use: Yes     Comment: rarely    Drug use: No    Sexual activity: Not on file   Lifestyle    Physical activity     Days per week: Not on file     Minutes per session: Not on file    Stress: Not on file   Relationships    Social connections     Talks on phone: Not on file Gets together: Not on file     Attends Bahai service: Not on file     Active member of club or organization: Not on file     Attends meetings of clubs or organizations: Not on file     Relationship status: Not on file    Intimate partner violence     Fear of current or ex partner: Not on file     Emotionally abused: Not on file     Physically abused: Not on file     Forced sexual activity: Not on file   Other Topics Concern    Not on file   Social History Narrative    Not on file       Review of Systems - General ROS: negative for - chills or fever  Psychological ROS: negative for - anxiety and depression  Hematological and Lymphatic ROS: No history of blood clots or bleeding disorder. Respiratory ROS: positive cough, or wheezing, no shortness of breath,  Cardiovascular ROS: no chest pain or dyspnea on exertion, tolerates a flight of stairs, vacuuming  Gastrointestinal ROS: no abdominal pain, change in bowel habits, or black or bloody stools  Genito-Urinary ROS: no dysuria, trouble voiding, or hematuria  Musculoskeletal ROS: negative for - muscle pain or muscular weakness  Neurological ROS: negative for - headaches, numbness/tingling, seizures or weakness  Dermatological ROS: negative for - rash or skin lesion changes    Blood pressure 120/80, pulse 60, temperature 96.4 °F (35.8 °C), height 5' 9.25\" (1.759 m), weight 252 lb 9.6 oz (114.6 kg).     Physical Examination: General appearance -alert, well appearing, and in no distress  Mental status - alert, oriented to person, place, and time  Neck - supple, no significant adenopathy  Chest - clear to auscultation, no wheezes, rales or rhonchi, symmetric air entry  Heart - normal rate, regular rhythm, normal S1, S2, no murmurs, rubs, clicks or gallops  Abdomen -soft, nontender, nondistended  Neurological - alert, oriented, speech and gait normal  Extremities - peripheral pulses normal, no pedal edema, no clubbing or cyanosis  Skin - warm and dry    Foot exam 8/5/19 - right second toe tip amputated. Right 1st toe with fungal infection along medial edge. Normal sensation with monofilament testing bilaterally. +2 PT and DP pulses bilaterally. Diagnostic data:   I have reviewed recent diagnostic testing including labs 12/2019 and HgA1c today. Assessment and Plan:        Diagnosis Orders   1. Type 2 diabetes mellitus without complication, without long-term current use of insulin (HCC)  POCT glycosylated hemoglobin (Hb A1C)    metFORMIN (GLUCOPHAGE) 500 MG tablet    lisinopril (PRINIVIL;ZESTRIL) 5 MG tablet    Basic Metabolic Panel    Microalbumin / Creatinine Urine Ratio   2. Pure hypercholesterolemia  rosuvastatin (CRESTOR) 10 MG tablet   3. H/O prostate cancer  PSA Prostatic Specific Antigen     DM - controlled. Need to get eye exam next week. Work on diet control as HgA1c increasing - continue metformin. Change diet - limit ice cream, cookies, junk food. Labs due now. Add lisinopril low dose to help protect kidneys. Hypercholesterolemia - off Lipitor due to myalgia. Now on Crestor - no myalgia. LDL 72 11/2019 and at goal, normal ALT 11/2019. History of prostate cancer - s/p prostatectomy - due for PSA. Follows with Urology. SOB/Chronic cough -  Not better with Zyrtec, PFT normal.  Continue to monitor. HM - Colonoscopy done 11/2019 with Dr. Mario Alberto Arriaga- repeat in 10 years. Follow up visit in 3 months, DM and BP check. Labs due in a month. Attending Supervising Physician's Attestation Statement  I performed a history and physical examination on the patient and discussed the management with the resident physician. I reviewed and agree with the findings and plan as documented in his note . See my note for more complete HPI and A/P. Philip Quiñones MD    . Tiffany Forrester  INTERNAL MEDICINE  [address]  Dept: [phone]  Dept Fax: [phone]  Loc: [phone]

## 2020-10-01 ENCOUNTER — OFFICE VISIT (OUTPATIENT)
Dept: INTERNAL MEDICINE CLINIC | Age: 65
End: 2020-10-01
Payer: COMMERCIAL

## 2020-10-01 ENCOUNTER — NURSE ONLY (OUTPATIENT)
Dept: LAB | Age: 65
End: 2020-10-01

## 2020-10-01 VITALS
DIASTOLIC BLOOD PRESSURE: 78 MMHG | BODY MASS INDEX: 36.12 KG/M2 | SYSTOLIC BLOOD PRESSURE: 118 MMHG | TEMPERATURE: 97.9 F | WEIGHT: 258 LBS | HEART RATE: 88 BPM | HEIGHT: 71 IN

## 2020-10-01 LAB
CREAT SERPL-MCNC: 1.2 MG/DL (ref 0.4–1.2)
GFR SERPL CREATININE-BSD FRML MDRD: 61 ML/MIN/1.73M2
POTASSIUM SERPL-SCNC: 4.4 MEQ/L (ref 3.5–5.2)

## 2020-10-01 PROCEDURE — 3051F HG A1C>EQUAL 7.0%<8.0%: CPT | Performed by: STUDENT IN AN ORGANIZED HEALTH CARE EDUCATION/TRAINING PROGRAM

## 2020-10-01 PROCEDURE — 90694 VACC AIIV4 NO PRSRV 0.5ML IM: CPT | Performed by: STUDENT IN AN ORGANIZED HEALTH CARE EDUCATION/TRAINING PROGRAM

## 2020-10-01 PROCEDURE — 90471 IMMUNIZATION ADMIN: CPT | Performed by: STUDENT IN AN ORGANIZED HEALTH CARE EDUCATION/TRAINING PROGRAM

## 2020-10-01 PROCEDURE — 99213 OFFICE O/P EST LOW 20 MIN: CPT | Performed by: STUDENT IN AN ORGANIZED HEALTH CARE EDUCATION/TRAINING PROGRAM

## 2020-10-01 ASSESSMENT — ENCOUNTER SYMPTOMS
VOMITING: 0
SHORTNESS OF BREATH: 0
BLOOD IN STOOL: 0
CONSTIPATION: 0
COUGH: 0
SORE THROAT: 0
ABDOMINAL PAIN: 0
EYE REDNESS: 1
DIARRHEA: 0
NAUSEA: 0
FACIAL SWELLING: 0

## 2020-10-01 NOTE — PROGRESS NOTES
1955    Chief Complaint   Patient presents with    Hypertension    Diabetes       Pt is a 72 y.o. male who presents for a follow up for labs after starting Lisinopril 5 mg qd during his last visit on 7/16/2020. Labs were ordered that visit to assess his potassium and creatinine specifically, however Albert Mendes had gotten the labs done immediately following the appointment. Because of this, I am unable to assess whether or not the Lisinopril has affected his kidney function. He reports that he has not had any side effects from starting the Lisinopril. His blood pressure is stable today with a value of 118/78 mmHg. We also talked about the influenza vaccine today and Albert Mendes agreed to receiving it today. Past Medical History:   Diagnosis Date    Benign essential tremor     Cough     Diabetes type 2, controlled ([address]     Hyperlipidemia     Hypertensive retinopathy     most recent eye exam - no DM retinopathy and no mention of hypertensive retinopathy.  Primary prostate cancer ([facility] [address] 3/21/2017    Prostate cancer ([facility] [address]     prostate--had prostate removed May 2017       Past Surgical History:   Procedure Laterality Date    BICEPS TENDON REPAIR Right 2015    EYE SURGERY  07/31/2018      Left    [place]ERN[place]PA[place]  05/02/2017    [place], [place]    PROSTATECTOMY  05/2017    Dr Anibal IBRAHIM [address] Bilateral 2011       Current Outpatient Medications   Medication Sig Dispense Refill    metFORMIN (GLUCOPHAGE) 500 MG tablet Take 2 tablets by mouth 2 times daily (with meals) 360 tablet 1    rosuvastatin (CRESTOR) 10 MG tablet TAKE 1 TABLET BY MOUTH  NIGHTLY 90 tablet 1    lisinopril (PRINIVIL;ZESTRIL) 5 MG tablet Take 1 tablet by mouth daily 90 tablet 1    aspirin EC 81 MG EC tablet Take 81 mg by mouth daily       No current facility-administered medications for this visit.         Allergies   Allergen Reactions    Dye [Iodides] Other (See Comments)     Nausea, alternating hot and cold and syncope. IV only    Lipitor [Atorvastatin Calcium]      Myalgia         Social History     Socioeconomic History    Marital status:      Spouse name: Not on file    Number of children: Not on file    Years of education: Not on file    Highest education level: Not on file   Occupational History    Not on file   Social Needs    Financial resource strain: Not on file    Food insecurity     Worry: Not on file     Inability: Not on file    Transportation needs     Medical: Not on file     Non-medical: Not on file   Tobacco Use    Smoking status: Former Smoker     Packs/day: 1.00     Years: 10.00     Pack years: 10.00     Last attempt to quit: 3/21/2011     Years since quittin.5    Smokeless tobacco: Former User   Substance and Sexual Activity    Alcohol use: Yes     Comment: rarely    Drug use: No    Sexual activity: Not on file   Lifestyle    Physical activity     Days per week: Not on file     Minutes per session: Not on file    Stress: Not on file   Relationships    Social connections     Talks on phone: Not on file     Gets together: Not on file     Attends [employer] service: Not on file     Active member of club or organization: Not on file     Attends meetings of clubs or organizations: Not on file     Relationship status: Not on file    Intimate partner violence     Fear of current or ex partner: Not on file     Emotionally abused: Not on file     Physically abused: Not on file     Forced sexual activity: Not on file   Other Topics Concern    Not on file   Social History Narrative    Not on file       Family History   Problem Relation Age of Onset    Other Mother         alzheimers    Alzheimer's Disease Mother     Cancer Father         prostate     Review of Systems   Constitutional: Negative for activity change and fatigue. HENT: Negative for facial swelling and sore throat.     Eyes: Positive for redness (chronic in his left Diagnosis Orders   1. Essential hypertension     2. Type 2 diabetes mellitus without complication, without long-term current use of insulin (HCC)  Potassium    Creatinine, Serum    HM DIABETES FOOT EXAM   3. Need for influenza vaccination  INFLUENZA, QUADV, ADJUVANTED, 72 YRS =, IM, PF, PREFILL SYR, 0.5ML (FLUAD)     # Type 2 DM Without Complications, Without Long-Term Current Use of Insulin  -Patients last A1c on 7/16/2020 was 7.1%. During this visit this was a newly established diagnosis of diabetes mellitus type 2, in which lisinopril was started to help protect from complications of diabetes that include diabetic nephropathy and diabetic retinopathy. I have ordered labs to be done after 2 weeks of starting lisinopril in order to assess any changes in his renal function. These labs were immediately done after that visit however, so they are not reliable in terms of any change in renal function due to lisinopril.  -I have ordered serum potassium and serum creatinine level, in which Jostin Hilario can do right away. I do not need to see him right away for this and will get the results sent to me. If I see a change in his renal function I will stop the lisinopril.  -Foot exam performed today, revealed some minor calluses on the medial aspect of his right foot. # Need for Influenza Vaccination    After obtaining consent, and per orders of Dr. Jonelle Guzmán, injection of FLUAD given in Left deltoid by Josias Andujar. Patient instructed to remain in clinic for 20 minutes afterwards, and to report any adverse reaction to me immediately. # Essential Hypertension  -Gabino's blood pressures adequately controlled, blood pressure today 118/78 mmHg. No changes needed. Jostin Hilario will follow-up with me in 3 months for a check up on his diabetes. **This note has been created using voice recognition software. It may contain minor errors which are inherent in voice recognition technology. **    Josias Andujar MD

## 2020-10-02 NOTE — PROGRESS NOTES
I received the patients lab results back (serum creatinine and potassium). His creatinine has risen to 1.2 from his previous value of 0.8 measured 2.5 months ago. I started the patient on 5 mg Lisinopril qd which might explain this increase in creatinine. I called Mariama Linares and instructed him to longer take the Lisinopril. His BP is stable and I will reassess new labs during his next visit with me.

## 2020-10-03 ENCOUNTER — CLINICAL DOCUMENTATION (OUTPATIENT)
Dept: INTERNAL MEDICINE CLINIC | Age: 65
End: 2020-10-03

## 2021-01-07 ENCOUNTER — OFFICE VISIT (OUTPATIENT)
Dept: INTERNAL MEDICINE CLINIC | Age: 66
End: 2021-01-07
Payer: COMMERCIAL

## 2021-01-07 VITALS
DIASTOLIC BLOOD PRESSURE: 80 MMHG | BODY MASS INDEX: 35.61 KG/M2 | HEART RATE: 84 BPM | HEIGHT: 71 IN | TEMPERATURE: 98 F | WEIGHT: 254.4 LBS | SYSTOLIC BLOOD PRESSURE: 125 MMHG

## 2021-01-07 DIAGNOSIS — I10 ESSENTIAL HYPERTENSION: ICD-10-CM

## 2021-01-07 DIAGNOSIS — Z23 NEED FOR PNEUMOCOCCAL VACCINATION: ICD-10-CM

## 2021-01-07 DIAGNOSIS — E78.00 PURE HYPERCHOLESTEROLEMIA: ICD-10-CM

## 2021-01-07 DIAGNOSIS — E11.9 TYPE 2 DIABETES MELLITUS WITHOUT COMPLICATION, WITHOUT LONG-TERM CURRENT USE OF INSULIN (HCC): Primary | ICD-10-CM

## 2021-01-07 LAB — HBA1C MFR BLD: 8.5 % (ref 4.3–5.7)

## 2021-01-07 PROCEDURE — 99214 OFFICE O/P EST MOD 30 MIN: CPT | Performed by: STUDENT IN AN ORGANIZED HEALTH CARE EDUCATION/TRAINING PROGRAM

## 2021-01-07 PROCEDURE — 3052F HG A1C>EQUAL 8.0%<EQUAL 9.0%: CPT | Performed by: STUDENT IN AN ORGANIZED HEALTH CARE EDUCATION/TRAINING PROGRAM

## 2021-01-07 PROCEDURE — 90670 PCV13 VACCINE IM: CPT | Performed by: INTERNAL MEDICINE

## 2021-01-07 PROCEDURE — 90471 IMMUNIZATION ADMIN: CPT | Performed by: INTERNAL MEDICINE

## 2021-01-07 PROCEDURE — 83036 HEMOGLOBIN GLYCOSYLATED A1C: CPT | Performed by: STUDENT IN AN ORGANIZED HEALTH CARE EDUCATION/TRAINING PROGRAM

## 2021-01-07 RX ORDER — ROSUVASTATIN CALCIUM 10 MG/1
TABLET, COATED ORAL
Qty: 90 TABLET | Refills: 1 | Status: SHIPPED | OUTPATIENT
Start: 2021-01-07 | End: 2021-07-22 | Stop reason: SDUPTHER

## 2021-01-07 SDOH — ECONOMIC STABILITY: TRANSPORTATION INSECURITY
IN THE PAST 12 MONTHS, HAS LACK OF TRANSPORTATION KEPT YOU FROM MEETINGS, WORK, OR FROM GETTING THINGS NEEDED FOR DAILY LIVING?: NO

## 2021-01-07 SDOH — ECONOMIC STABILITY: TRANSPORTATION INSECURITY
IN THE PAST 12 MONTHS, HAS THE LACK OF TRANSPORTATION KEPT YOU FROM MEDICAL APPOINTMENTS OR FROM GETTING MEDICATIONS?: NO

## 2021-01-07 ASSESSMENT — ENCOUNTER SYMPTOMS
ABDOMINAL PAIN: 0
DIARRHEA: 0
COUGH: 0
BLOOD IN STOOL: 0
SHORTNESS OF BREATH: 0
EYE REDNESS: 0
SORE THROAT: 0

## 2021-01-07 ASSESSMENT — PATIENT HEALTH QUESTIONNAIRE - PHQ9
SUM OF ALL RESPONSES TO PHQ QUESTIONS 1-9: 0
SUM OF ALL RESPONSES TO PHQ9 QUESTIONS 1 & 2: 0

## 2021-01-07 NOTE — PROGRESS NOTES
After obtaining consent, and per orders of Dr. Baylee Jacobsen, injection of Prevnar 13 given in left deltoid by [facility] [place]. Patient instructed to remain in clinic for 20 minutes afterwards, and to report any adverse reaction to me immediately.

## 2021-01-07 NOTE — PROGRESS NOTES
Omar Marrero (:  1955) is a 72 y.o. male,Established patient, here for evaluation of the following chief complaint(s):  Diabetes (3 months ), Hypertension, and Hyperlipidemia      ASSESSMENT/PLAN:  1. Type 2 diabetes mellitus without complication, without long-term current use of insulin ([facility])  -     POCT glycosylated hemoglobin (Hb A1C)  -     [address]457 - Collection Capillary Blood Specimen  -     Basic Metabolic Panel; Future  -     Lipid Panel; Future   -     ALT; Future  -      DIABETES FOOT EXAM    -Patient's hemoglobin A1c increased to 8.5. Patient attests this to eating poorly due to the holiday season. Patient wishes to change diet habits and do more exercise. Will reassess A1c during his next visit with me in 3 months. If his A1c is still inadequate/diabetes poorly controlled, will add SGLT2 inhibitor along with his Metformin. 2. Essential hypertension     -During Gabino's last visit with me, lisinopril was added. On follow-up BMP was noted that his creatinine had increased from 0.8-1.2. During that time I had called Abby Griffin myself and discussed these findings, as well as discontinued his Lisinopril. -BP is controlled, 125/80 mmHg. 3. Pure hypercholesterolemia  -     rosuvastatin (CRESTOR) 10 MG tablet; TAKE 1 TABLET BY MOUTH  NIGHTLY, Disp-90 tablet, R-1Normal    -Will go over lipid panel results during his next visit with me    4. Need for pneumococcal vaccination  -     PREVNAR 13 IM (Pneumococcal conjugate vaccine 13-valent)  -     LA ADMIN PNEUMOCOCCAL VACCINE    Return in 13 weeks (on 2021) for Follow up for diabetes and hypertension. SUBJECTIVE/OBJECTIVE:   LOUISA Griffin is a pleasant 70-year-old male with history of recently diagnosed type 2 diabetes mellitus, essential hypertension, hyperlipidemia who is following up with me for his diabetes and hypertension. During his last visit with me, I had added lisinopril for ophthalmological and renal protection.   However, his follow-up BMP resulted in an increase in creatinine from 0.8-1.2. I called John Gastelum myself after this and discontinued his lisinopril. His blood pressure is adequately controlled without hypertensive agents. His A1c during this visit is 8.5, increased from his value of 7.1 during his last visit with me 3 months ago. John Gastelum states that due to the holiday season he has not had a proper diet. I discussed at length about the concern of this increase in A1c from 7.1-8.5. John Gastelum wishes to try a  diet and exercise approach first before adding on more diabetic medication in addition to his Metformin. Today he states that his son tested positive for COVID-19 recently. John Gastelum and his wife were tested for COVID-19 last week and were negative. Otherwise he has no complaints. See below for complete ROS. Review of Systems   Constitutional: Negative for activity change, fatigue and unexpected weight change. HENT: Negative for hearing loss and sore throat. Eyes: Negative for redness and visual disturbance. Respiratory: Negative for cough and shortness of breath. Cardiovascular: Negative for chest pain, palpitations and leg swelling. Gastrointestinal: Negative for abdominal pain, blood in stool and diarrhea. Endocrine: Negative for polydipsia and polyuria. Genitourinary: Negative for difficulty urinating and dysuria. Musculoskeletal: Negative for arthralgias and myalgias. Skin: Negative for rash and wound. Neurological: Negative for dizziness, numbness and headaches. Hematological: Negative for adenopathy. Does not bruise/bleed easily. Psychiatric/Behavioral: Negative for behavioral problems, decreased concentration and sleep disturbance. Physical Exam  Constitutional:       Appearance: Normal appearance. He is not ill-appearing. HENT:      Head: Normocephalic and atraumatic. Nose: Nose normal. No rhinorrhea.       Mouth/Throat:      Mouth: Mucous membranes are moist.      Pharynx: Oropharynx is clear. Eyes:      Extraocular Movements: Extraocular movements intact. Conjunctiva/sclera: Conjunctivae normal.   Neck:      Musculoskeletal: Normal range of motion and neck supple. Cardiovascular:      Rate and Rhythm: Normal rate and regular rhythm. Pulses: Normal pulses. Heart sounds: Normal heart sounds. No murmur. Pulmonary:      Effort: Pulmonary effort is normal.      Breath sounds: Normal breath sounds. Abdominal:      General: Abdomen is flat. Bowel sounds are normal.      Palpations: Abdomen is soft. Musculoskeletal: Normal range of motion. Skin:     General: Skin is warm. Capillary Refill: Capillary refill takes less than 2 seconds. Findings: No bruising or rash. Neurological:      General: No focal deficit present. Mental Status: He is alert and oriented to person, place, and time. Mental status is at baseline. Psychiatric:         Mood and Affect: Mood normal.         Behavior: Behavior normal.         Thought Content: Thought content normal.         Judgment: Judgment normal.       **This note has been created using voice recognition software. It may contain minor errors which are inherent in voice recognition technology. **    An electronic signature was used to authenticate this note.     --Abran Bright MD

## 2021-01-11 ENCOUNTER — NURSE ONLY (OUTPATIENT)
Dept: LAB | Age: 66
End: 2021-01-11

## 2021-01-11 DIAGNOSIS — E11.9 TYPE 2 DIABETES MELLITUS WITHOUT COMPLICATION, WITHOUT LONG-TERM CURRENT USE OF INSULIN (HCC): ICD-10-CM

## 2021-01-11 LAB
ALT SERPL-CCNC: 29 U/L (ref 11–66)
ANION GAP SERPL CALCULATED.3IONS-SCNC: 10 MEQ/L (ref 8–16)
BUN BLDV-MCNC: 20 MG/DL (ref 7–22)
CALCIUM SERPL-MCNC: 10 MG/DL (ref 8.5–10.5)
CHLORIDE BLD-SCNC: 102 MEQ/L (ref 98–111)
CHOLESTEROL, TOTAL: 104 MG/DL (ref 100–199)
CO2: 27 MEQ/L (ref 23–33)
CREAT SERPL-MCNC: 1 MG/DL (ref 0.4–1.2)
GFR SERPL CREATININE-BSD FRML MDRD: 75 ML/MIN/1.73M2
GLUCOSE BLD-MCNC: 221 MG/DL (ref 70–108)
HDLC SERPL-MCNC: 39 MG/DL
LDL CHOLESTEROL CALCULATED: 48 MG/DL
POTASSIUM SERPL-SCNC: 5.1 MEQ/L (ref 3.5–5.2)
SODIUM BLD-SCNC: 139 MEQ/L (ref 135–145)
TRIGL SERPL-MCNC: 85 MG/DL (ref 0–199)

## 2021-01-18 ENCOUNTER — TELEPHONE (OUTPATIENT)
Dept: INTERNAL MEDICINE CLINIC | Age: 66
End: 2021-01-18

## 2021-01-18 NOTE — TELEPHONE ENCOUNTER
----- Message from John Layne MD sent at 1/15/2021 12:50 PM EST -----  Let him know lab was normal but glucose high - need to really work on diet and weight loss.

## 2021-01-18 NOTE — TELEPHONE ENCOUNTER
Left message per Hipaa that labs were normal except glucose was high , need to work on diet and weight loss. Call the office if any questions.

## 2021-07-13 ENCOUNTER — TELEPHONE (OUTPATIENT)
Dept: INTERNAL MEDICINE CLINIC | Age: 66
End: 2021-07-13

## 2021-07-13 NOTE — TELEPHONE ENCOUNTER
Patient's wife called stating that Darby Bonilla is being seen 7/22 and she was asking if there are any labs you want prior to appointment . Last labs were Lipid , ALT and BMP done on 1/11/21. Please advise.

## 2021-07-19 DIAGNOSIS — Z12.5 PROSTATE CANCER SCREENING: ICD-10-CM

## 2021-07-19 DIAGNOSIS — E11.9 TYPE 2 DIABETES MELLITUS WITHOUT COMPLICATION, WITHOUT LONG-TERM CURRENT USE OF INSULIN (HCC): Primary | ICD-10-CM

## 2021-07-20 ENCOUNTER — NURSE ONLY (OUTPATIENT)
Dept: LAB | Age: 66
End: 2021-07-20

## 2021-07-20 DIAGNOSIS — E11.9 TYPE 2 DIABETES MELLITUS WITHOUT COMPLICATION, WITHOUT LONG-TERM CURRENT USE OF INSULIN (HCC): ICD-10-CM

## 2021-07-20 DIAGNOSIS — Z12.5 PROSTATE CANCER SCREENING: ICD-10-CM

## 2021-07-20 LAB
ANION GAP SERPL CALCULATED.3IONS-SCNC: 13 MEQ/L (ref 8–16)
BUN BLDV-MCNC: 17 MG/DL (ref 7–22)
CALCIUM SERPL-MCNC: 9.5 MG/DL (ref 8.5–10.5)
CHLORIDE BLD-SCNC: 100 MEQ/L (ref 98–111)
CO2: 25 MEQ/L (ref 23–33)
CREAT SERPL-MCNC: 0.9 MG/DL (ref 0.4–1.2)
CREATININE, URINE: 311.5 MG/DL
GFR SERPL CREATININE-BSD FRML MDRD: 84 ML/MIN/1.73M2
GLUCOSE BLD-MCNC: 180 MG/DL (ref 70–108)
MICROALBUMIN UR-MCNC: 1.36 MG/DL
MICROALBUMIN/CREAT UR-RTO: 4 MG/G (ref 0–30)
POTASSIUM SERPL-SCNC: 4.4 MEQ/L (ref 3.5–5.2)
PROSTATE SPECIFIC ANTIGEN: < 0.02 NG/ML (ref 0–1)
SODIUM BLD-SCNC: 138 MEQ/L (ref 135–145)

## 2021-07-20 NOTE — PROGRESS NOTES
1/11/2022.    3. Screening for AAA (Abdominal Aortic Aneurysm)  · Discussed at length about the guideline recommendation for male patients over the age of 72 with any smoking history. · Nasreen Hardwick is a former smoker. 10-pack-year history. Quit 10 years ago. He is 77years old. · Has never had the AAA ultrasound screening test.  · Nasreen Hardwick wishes to discuss with his wife before we order the ultrasound test.  · Nasreen Hardwick was instructed to call his insurance company as ask about the coverage/price. · Will reassess and order next visit if insurance coverage is available for the screening. 4. Acute Right-Sided Thoracic Back Pain  · Located near the right lower ribs. Occurred 2 weeks ago, has been improving with heating, no use of OTC pain medications. No tenderness on palpation. No lesions on inspection. No other reported symptoms. Occurred after Nasreen Hardwick was fishing. No reported trauma. · No masses/lumps/bumps on palpation. · Occurs when Nasreen Hardwick goes from a sitting to standing position. · Etiology is secondary to musculoskeletal strain. Nasreen Hardwick was instructed to keep heating the area. 5. Essential Hypertension  · History of. It is now well controlled without the current use of antihypertensive medications. · /80 mmHg today. · Will continue to monitor and assess each visit. 6. Medicare Welcome Visit  · Scheduled for 9/23/2021. Disposition:  Return in 4 weeks (on 8/19/2021) for Diabetes check up. BMP due prior to visit. HPI     Nasreen Hardwick is a very pleasant 78-year-old  male, former smoker, with history of recently diagnosed NIDDM 2, essential HTN and HLD who is following up with me for his diabetes and hypertension. Gabino's last visit with me was on 1/7/2021, he had missed his 3-month follow-up appointment and so this is a 6-month follow-up appointment. During his last visit his A1c had increased to 8.5%. Previously was 7.1%.  At that time he stated it was due to the holiday season causing an improper diet. An in length discussion was done about the concern with his rising A1c. Belén Carmona wished to try a diet and exercise approach before adding further diabetic medication. He is currently taking Metformin 500 mg, 2 pills BID. Belén Carmona had the following labs (with results) done on 7/20/2021:    PSA: < 0.02  BMP: Na 138, K 4.4, Cl 100, CO2 25, BUN 17, Creatinine 0.9, Calcium 9.5, Glucose 180  Microalbumin (Random Urine) 1.36, Urine Creatinine 311.5, Microalbumin/Creat Ratio 4  Anion Gap: 13.0  EGFR: 84    Today: Belén Carmona reports no hospitalizations or any other major events since his last visit with me. He states he has had some blurry vision recently, checked his blood sugar and it read 247. He checked it again a little later and stated it was 140. Blurry vision had improved. No reoccurrence of this. He has been exercising since his last visit, states he has been biking a lot and fishing. He has lost 9 lbs since January 2021. Has been trying to keep a consistent low-carb diet. He does not check his blood sugar daily. He reports that he has started to have right-sided lower thoracic back pain that occurred 2 weeks ago. He denies any trauma. States that it occurred after fishing. Furthermore, he has been heating the area and has not used any OTC pain medications. Reports significant improvement. Currently denies back pain. States that it really occurs when he goes from a sitting to a standing position. No reported associated symptoms. # Non-Insulin Dependent Diabetes Mellitus Type 2    Hemoglobin A1c - 7.7% today. Is on Metformin which has been maxed out dosage wise. He has been doing a diet and exercise approach. Wished to do this prior to adding any additional diabetic medication. Will add Jardiance in addition to his Metformin for goal hemoglobin A1c < 6.5%.     Hemoglobin A1C (%)   Date Value   01/07/2021 8.5 (H)   07/16/2020 7.1 (H)   03/02/2019 8.5 (H)   08/15/2018 7.3 (H)     Patient only occasionally checks blood sugars once a day. Does not keep a log. Most recent eye exam on 10/28/2020, with Dr. Ileana Rosa O.D. who reported no evidence of DM retinopathy. Patient reminded to have eye exam done yearly. Renal function is stable compared to baseline. Most recent pertinent labs with previous values to compare:    Est, Glom Filt Rate (ml/min/1.73m2)   Date Value   07/20/2021 84 (A)   01/11/2021 75 (A)   10/01/2020 61 (A)      CREATININE (mg/dL)   Date Value   07/20/2021 0.9   01/11/2021 1.0   10/01/2020 1.2     Microalbumin Creatinine Ratio (mg/Gm)   Date Value   08/17/2019 9.3   08/15/2018 3.6      LDL Calculated (mg/dL)   Date Value   01/11/2021 48       Interpretation of labs:   GFR stable  Creatinine stable  Microalbumin/Cr Ratio stable     Patient denies foot lesions. Last foot exam done on 1/7/2021. Denies neuropathy. denies symptoms of autonomic dysfunction. Counseling at today's visit: reminded to check sugars regularly and to bring readings in at the time of the next visit. Full discussion was done regarding the addition of Jardiance. Discussed that risks of Jardiance that includes dehydration, UTIs, and risk of luly's gangrene. Patient is uncircumcised. Had a detailed discussion about the importance of genital hygiene. Patient instructed to come into office immediately if he experiences genital pain or has any cuts/lesions in the genital area. Encouraged continued aerobic exercise. Discussed ways to avoid symptomatic hypoglycemia. Labs: BMP due prior to next visit. # Essential Hypertension    BP today is 120/80 mmHg. Blood pressure has been well controlled without the use of antihypertensive agents. Was previously started on Lisinopril, 5 mg daily however his Cr had increased to 30% above his baseline value and so the Lisinopril was discontinued. # Hyperlipidemia    Last lipid panel done on 1/11/2021. HDL 39, LDL 48. Triglycerides 85.  Well-controlled on Crestor 10 mg daily. Next lipid panel due after 1/11/2022. # AAA Screening    Pollo Garcia has a 10-pack-year smoking history. He quit smoking 10 years ago. No records on file of an AAA screening. As per [employer], a AAA Ultrasound screening was recommended to Pollo Garcia. He stated that he wishes to talk to his wife about it. He also is unsure if his insurance, which was changed to Medicare, will cover the ultrasound screening. Pollo Garcia will call his insurance company to find out about coverage. # Acute Right-Sided Thoracic Back Pain    Occurred after he was fishing. Has had significant improvement with heating the area, no use of OTC pain medications. No reported associated symptoms. On evaluation there is no mass or lesion. No pain currently. Occurs when he goes from a sitting to standing position. Etiology is musculoskeletal.  Encouraged him to keep heating the area. No indication for imaging. # Medicare Welcome Visit    Pollo Garcia will be scheduled for this welcome visit on 9/23/2021. ROS  Constitutional: Denies any recent wt change, denies fevers, denies night sweats  Eyes: Reports an incident of blurry vision with hyperglycemia that resolved on its own  Ears/Nose/Mouth/Throat:  Denies any chronic sinus/rhinitis, sore throats  Cardiovascular: Denies any chest pain, palpitations, leg swelling   Respiratory:  Denies any frequent cough, wheezing or shortness of breath  Genitourinary:  Denies difficulty with urination, denies blood in urine, denies foamy urine  Gastrointestinal:  Denies any new abdominal pain, denies changes in stool frequency, denies blood in stool  Musculoskeletal:  Reports a 2 week duration of right sided back pain near the bottom of his ribs. Has been improving.     Integumentary:  Denies any rash or lesions  Neurological:  No numbness or tingling  Endocrine:  Denies any heat or cold interolerance   Hematologic/Lymphatic:  Denies any new bruises, denies any new lumps or bumps    Past Medical History:  has a past medical history of Benign essential tremor, Cough, Hyperglycemia, Hyperlipidemia, Hypertensive retinopathy, Primary prostate cancer ([facility] [address] Prostate cancer ([facility] [address] and Type 2 diabetes mellitus without complication, without long-term current use of insulin ([facility] [address] .     Past Surgical History:  has a past surgical history that includes Biceps tendon repair (Right, 2015); hernia repair (05/02/2017); Prostatectomy (05/2017); eye surgery (07/31/2018); and Total knee arthroplasty (Bilateral, 2011). .     Family History: family history includes Alzheimer's Disease in his mother; Cancer in his father; Other in his mother. .     Social History:  reports that he quit smoking about 10 years ago. He has a 10.00 pack-year smoking history. He has quit using smokeless tobacco. He reports current alcohol use. He reports that he does not use drugs. .     Allergies: is allergic to dye [iodides] and lipitor [atorvastatin calcium]. .     Current Medications:  Outpatient Medications Marked as Taking for the 7/22/21 encounter (Office Visit) with Bety Stearns MD   Medication Sig Dispense Refill    VITAMIN D PO Take by mouth daily      rosuvastatin (CRESTOR) 10 MG tablet TAKE 1 TABLET BY MOUTH  NIGHTLY 90 tablet 1    metFORMIN (GLUCOPHAGE) 500 MG tablet TAKE 2 TABLETS BY MOUTH  TWICE DAILY WITH MEALS 360 tablet 1    empagliflozin (JARDIANCE) 25 MG tablet Take 25 mg by mouth daily 30 tablet 2    aspirin EC 81 MG EC tablet Take 81 mg by mouth daily        PHYSICAL EXAMINATION     Vitals:    07/22/21 1308   BP: 120/80   Pulse: 80   Temp: 97.3 °F (36.3 °C)       Body mass index is 34.25 kg/m². Constitutional: In no acute distress. Appears documented age. Pleasant and cooperative. HENT:   Head: Normocephalic and atraumatic. Mouth/Throat: Oropharynx is clear and moist.  No oral thrush. Eyes: Conjunctivae are normal. Pupils are equal, round, and reactive to light. No scleral icterus.    Neck: mg/dL Final    CREATININE 07/20/2021 0.9  0.4 - 1.2 mg/dL Final    Calcium 07/20/2021 9.5  8.5 - 10.5 mg/dL Final    Performed at [address] [place] [address], [address], Random Urine 07/20/2021 1.36  mg/dL Final    Creatinine, Urine 07/20/2021 311.5  mg/dL Final    Microalb/Creat Ratio 07/20/2021 4  0 - 30 mg/g Final    Performed at [address] [place] [address], [address]    Anion Gap 07/20/2021 13.0  8.0 - 16.0 meq/L Final    Comment: ANION GAP = Sodium -(Chloride + CO2)  Performed at [address] [place] [address], [address]      Est, Glom Filt Rate 07/20/2021 84* ml/min/1.73m2 Final    Comment: Stage Description                    GFR, ml/min/1.73 m2   -   At increased risk               > or = 60 (with chronic                                       kidney disease risk factors)   1   Normal or increased GFR         > or = 90   2   Mildly or decreased GFR         60 - 89   3   Moderately decreased GFR        30 - 59   4   Severely decreased GFR          15 - 29   5   Kidney failure                  <15 (or dialysis)  Estimated GFR calculated using abbreviated MDRD formula as  recommended by [employer]. Calculation based  upon serum creatinine and adjusted for age, gender & race. Laura. Internal Med., Vol. 139 (2) pg 137-147.   Performed at [place], [address]     Nurse Only on 01/11/2021   Component Date Value Ref Range Status    Sodium 01/11/2021 139  135 - 145 meq/L Final    Potassium 01/11/2021 5.1  3.5 - 5.2 meq/L Final    Chloride 01/11/2021 102  98 - 111 meq/L Final    CO2 01/11/2021 27  23 - 33 meq/L Final    Glucose 01/11/2021 221* 70 - 108 mg/dL Final    BUN 01/11/2021 20  7 - 22 mg/dL Final    CREATININE 01/11/2021 1.0  0.4 - 1.2 mg/dL Final    Calcium 01/11/2021 10.0  8.5 - 10.5 mg/dL Final    Performed at [place], [facility]   Celeste Coral ALT 01/11/2021 29 abbreviated MDRD formula as  recommended by [employer]. Calculation based  upon serum creatinine and adjusted for age, gender & race. Laura. Internal Med., Vol. 139 (2) pg 137-147. Performed at [address] [place] [address], [address]     Office Visit on 01/07/2021   Component Date Value Ref Range Status    Hemoglobin A1C 01/07/2021 8.5* 4.3 - 5.7 % Final    Performed at [facility] under CLIA #  [id number]   Nurse Only on 10/01/2020   Component Date Value Ref Range Status    Potassium 10/01/2020 4.4  3.5 - 5.2 meq/L Final    Performed at [address] [place] [address], [address] 10/01/2020 1.2  0.4 - 1.2 mg/dL Final    Performed at [address] [place] [address], [address]    Est, Glom Filt Rate 10/01/2020 61* ml/min/1.73m2 Final    Comment: Stage Description                    GFR, ml/min/1.73 m2   -   At increased risk               > or = 60 (with chronic                                       kidney disease risk factors)   1   Normal or increased GFR         > or = 90   2   Mildly or decreased GFR         60 - 89   3   Moderately decreased GFR        30 - 59   4   Severely decreased GFR          15 - 29   5   Kidney failure                  <15 (or dialysis)  Estimated GFR calculated using abbreviated MDRD formula as  recommended by [employer]. Calculation based  upon serum creatinine and adjusted for age, gender & race. Laura. Internal Med., Vol. 139 (2) pg 137-147. Performed at [place], [address]        Radiology:  None new to report    Other Studies  No results found for: LVEF, LVEFMODE    An electronic signature was used to authenticate this note. **Parts of this note may have been dictated using voice recognition software. It may contain minor errors not detected in proofreading. **    Electronically signed by Maria Antonia Mendoza MD on 7/22/2021 at 3:48 PM  Internal Medicine PGY-2  [facility], [address]

## 2021-07-22 ENCOUNTER — OFFICE VISIT (OUTPATIENT)
Dept: INTERNAL MEDICINE CLINIC | Age: 66
End: 2021-07-22
Payer: COMMERCIAL

## 2021-07-22 VITALS
DIASTOLIC BLOOD PRESSURE: 80 MMHG | BODY MASS INDEX: 34.38 KG/M2 | TEMPERATURE: 97.3 F | SYSTOLIC BLOOD PRESSURE: 120 MMHG | HEIGHT: 71 IN | HEART RATE: 80 BPM | WEIGHT: 245.6 LBS

## 2021-07-22 DIAGNOSIS — I10 ESSENTIAL HYPERTENSION: ICD-10-CM

## 2021-07-22 DIAGNOSIS — M54.6 ACUTE RIGHT-SIDED THORACIC BACK PAIN: ICD-10-CM

## 2021-07-22 DIAGNOSIS — Z13.6 SCREENING FOR AAA (ABDOMINAL AORTIC ANEURYSM): ICD-10-CM

## 2021-07-22 DIAGNOSIS — E78.00 PURE HYPERCHOLESTEROLEMIA: ICD-10-CM

## 2021-07-22 DIAGNOSIS — E11.9 TYPE 2 DIABETES MELLITUS WITHOUT COMPLICATION, WITHOUT LONG-TERM CURRENT USE OF INSULIN (HCC): Primary | ICD-10-CM

## 2021-07-22 PROBLEM — L08.9 STAPHYLOCOCCAL INFECTION OF SKIN: Status: RESOLVED | Noted: 2017-06-23 | Resolved: 2021-07-22

## 2021-07-22 PROBLEM — N43.3 HYDROCELE: Status: RESOLVED | Noted: 2017-06-26 | Resolved: 2021-07-22

## 2021-07-22 PROBLEM — B95.8 STAPHYLOCOCCAL INFECTION OF SKIN: Status: RESOLVED | Noted: 2017-06-23 | Resolved: 2021-07-22

## 2021-07-22 PROBLEM — R00.1 BRADYCARDIA: Status: RESOLVED | Noted: 2017-03-21 | Resolved: 2021-07-22

## 2021-07-22 LAB — HBA1C MFR BLD: 7.7 % (ref 4.3–5.7)

## 2021-07-22 PROCEDURE — 83036 HEMOGLOBIN GLYCOSYLATED A1C: CPT | Performed by: STUDENT IN AN ORGANIZED HEALTH CARE EDUCATION/TRAINING PROGRAM

## 2021-07-22 PROCEDURE — 3051F HG A1C>EQUAL 7.0%<8.0%: CPT | Performed by: STUDENT IN AN ORGANIZED HEALTH CARE EDUCATION/TRAINING PROGRAM

## 2021-07-22 PROCEDURE — 99214 OFFICE O/P EST MOD 30 MIN: CPT | Performed by: STUDENT IN AN ORGANIZED HEALTH CARE EDUCATION/TRAINING PROGRAM

## 2021-07-22 RX ORDER — EMPAGLIFLOZIN 25 MG/1
25 TABLET, FILM COATED ORAL DAILY
Qty: 30 TABLET | Refills: 2 | Status: SHIPPED | OUTPATIENT
Start: 2021-07-22 | End: 2021-08-19 | Stop reason: SDUPTHER

## 2021-07-22 RX ORDER — ROSUVASTATIN CALCIUM 10 MG/1
TABLET, COATED ORAL
Qty: 90 TABLET | Refills: 1 | Status: SHIPPED | OUTPATIENT
Start: 2021-07-22 | End: 2021-08-19 | Stop reason: SDUPTHER

## 2021-07-22 NOTE — PATIENT INSTRUCTIONS
Make sure to drink more water while taking Jardiance. Cut back on caffeine which will dehydrate you. Make sure to maintain proper genital hygiene while taking Jardiance. IF you have any sores or cuts/wounds in your genital area, please come into our office immediately. Please check your blood sugar once in the morning before breakfast and keep a log. Bring your log in with us next time. Please check with your insurance if an AAA Screening Ultrasound is covered.

## 2021-08-13 ENCOUNTER — NURSE ONLY (OUTPATIENT)
Dept: LAB | Age: 66
End: 2021-08-13

## 2021-08-13 DIAGNOSIS — E11.9 TYPE 2 DIABETES MELLITUS WITHOUT COMPLICATION, WITHOUT LONG-TERM CURRENT USE OF INSULIN (HCC): ICD-10-CM

## 2021-08-13 LAB
ANION GAP SERPL CALCULATED.3IONS-SCNC: 14 MEQ/L (ref 8–16)
BUN BLDV-MCNC: 17 MG/DL (ref 7–22)
CALCIUM SERPL-MCNC: 10.1 MG/DL (ref 8.5–10.5)
CHLORIDE BLD-SCNC: 103 MEQ/L (ref 98–111)
CO2: 23 MEQ/L (ref 23–33)
CREAT SERPL-MCNC: 1.1 MG/DL (ref 0.4–1.2)
GFR SERPL CREATININE-BSD FRML MDRD: 67 ML/MIN/1.73M2
GLUCOSE BLD-MCNC: 170 MG/DL (ref 70–108)
POTASSIUM SERPL-SCNC: 5.3 MEQ/L (ref 3.5–5.2)
SODIUM BLD-SCNC: 140 MEQ/L (ref 135–145)

## 2021-08-18 NOTE — PROGRESS NOTES
Patient Name: Saroj Wisdom  YOB: 1955  MRN: [id number]  Office visit date: 8/19/2021    Chief Complaint: Diabetes (4 week follow up) and Hyperlipidemia    Assessment/Plan:  1. NIDDM2, uncontrolled: HgbA1C 7.7% (7/22/21). He is on Metformin 1000mg BID and Jardiance 25mg QD. [place] was started one month ago and he is tolerating it well. BMP was reviewed, only significant for mild hyperkalemia due to patient eating/drinking more tomato juice and bananas. - Provided patient with print out of foods with low and high potassium content. Discussed foods with high carbohydrates and to improve diet. Continue with the exercise. - Medications were refilled   - BMP ordered for repeat in one week to monitor potassium    2. HLD, controlled: Lipid panel and LFT's due 1/11/22.  - Rosuvastatin was refilled. Continue diet/exercise. Return in about 2 months (around 10/25/2021). Subjective/Objective:    HPI:     Saroj Wisdom is a 77 y.o. male who presents for an established patient visit. He is here for evaluation of Diabetes (4 week follow up) and Hyperlipidemia    This is a patient of Dr. Anh Gunn. He is here for his four week follow-up for diabetes. NIDDM2, uncontrolled -  Last HgA1c 7.7% (7/22/21), 8.5% (1/7/21) - on Metformin 1000mg BID and Jardiance 25mg QD. [place] was started at last visit. He is paying $35 for it monthly. He states it is a bit costly to his monthly expenditure, but he will continue it for now. He has not had any significant side effects from [place]. He denies any dysuria, frequency, or urgency. He is keeping himself well-hydrated. Patient checks FSBS once a day. FSBS ranges 130-170s, see media section for specific numbers (if patient brought to visit today). It is on the higher end when he eats pies and ice cream. His diet is about the same as the last visit. He eats a lot of vegetables and fruits. Hamburgers and tacos once in awhile.  Discussed foods with high carbohydrates and recommended to decrease their intake. Most recent eye 10/28/20, with . Dr. Meena Scott O.D. noted no DM retinopathy. Patient reminded to have eye exam done yearly. Mildly decreased renal function, eGFR 67 (8/13/21), Creatinine 1.1 (8/13/21), normal microalbumin/creat ratio 4 (7/20/21). Patient denies foot lesions. Last foot exam 1/7/21. Denies neuropathy. No autonomic dysfunction. LDL 48 (1/11/21). BMP was ordered last visit, which he completed on 8/13/21. It was reviewed and overall normal, except for mildly elevated potassium of 5.3 and glucose of 170. His potassium was 4.4 on 7/20/21. He does not take any potassium supplements. He has been drinking more tomato juice and eating bananas. He did not eat breakfast prior to this lab. Patient is on an Aspirin and statin. He is not on an ACE inhibitor because it was trialed in the past and caused his creatinine and kidney function to worsen. HLD, controlled: On Rosuvastatin. Last Lipid Panel on 1/11/21 - Cholesterol 104, Trig 85, HDL 39, LDL 48    Essential HTN, diet and exercise controlled: His BP was 120/80 at last visit about 4 weeks ago. It is also controlled in our office. He has lost 35 lbs since he started exercising. His goal is to lose 30 more lbs. AAA Screening: He is a former smoker with a 10 pack year history and quit 10 years ago (2011). He was advised to call his insurance company for coverage/price of ultrasound screening as per recommendations for USPSTF. He has not had a chance to call them and wants to hold off for now. He denies any abdominal pain, back pain, or weakness in his extremities. Medicare Welcome Visit: Scheduled for 9/23/21.     Past Medical History:   Diagnosis Date    Benign essential tremor     Bradycardia 03/21/2017    Essential hypertension     Well controlled on no antihypertensives    Hyperlipidemia     Well controlled on Crestor    Hypertensive retinopathy     most recent eye exam - no DM retinopathy and no mention of hypertensive retinopathy.  Primary prostate cancer ([facility] [address] 03/21/2017    prostate--had prostate removed May 2017    Staphylococcal infection of skin 06/23/2017    Type 2 diabetes mellitus without complication, without long-term current use of insulin [facility])      Past Surgical History:   Procedure Laterality Date    BICEPS TENDON REPAIR Right 2015    EYE SURGERY  07/31/2018      Left    HERNIA [place]PA[place]  05/02/2017    [place], [place]    PROSTATECTOMY  05/2017    Dr Martha Adams    TOTAL KNEE ARTHROPLASTY Bilateral 2011     Current Outpatient Medications   Medication Sig Dispense Refill    rosuvastatin (CRESTOR) 10 MG tablet TAKE 1 TABLET BY MOUTH  NIGHTLY 90 tablet 1    metFORMIN (GLUCOPHAGE) 500 MG tablet TAKE 2 TABLETS BY MOUTH  TWICE DAILY WITH MEALS 360 tablet 1    empagliflozin (JARDIANCE) 25 MG tablet Take 25 mg by mouth daily 90 tablet 1    VITAMIN D PO Take by mouth daily      aspirin EC 81 MG EC tablet Take 81 mg by mouth daily       No current facility-administered medications for this visit. Allergies   Allergen Reactions    Dye [Iodides] Other (See Comments)     Nausea, alternating hot and cold and syncope. IV only     Review of Systems   Constitutional: Negative for activity change, chills and fever. HENT: Negative for congestion, rhinorrhea and sore throat. Respiratory: Negative for cough, shortness of breath and wheezing. Cardiovascular: Negative for chest pain, palpitations and leg swelling. Gastrointestinal: Negative for abdominal pain, diarrhea, nausea and vomiting. Genitourinary: Negative for difficulty urinating, dysuria, frequency, hematuria and urgency. Musculoskeletal: Negative for arthralgias and back pain. Skin: Negative for pallor, rash and wound. Neurological: Negative for dizziness, weakness, light-headedness and headaches.    Psychiatric/Behavioral: Negative for agitation, behavioral problems and confusion. The patient is not nervous/anxious. Vitals:    08/19/21 1340   BP: 120/80   Site: Left Upper Arm   Pulse: 82   Temp: 97.5 °F (36.4 °C)   Weight: 236 lb 6.4 oz (107.2 kg)   Height: 5' 10.98\" (1.803 m)       Wt Readings from Last 3 Encounters:   08/19/21 236 lb 6.4 oz (107.2 kg)   07/22/21 245 lb 9.6 oz (111.4 kg)   01/07/21 254 lb 6.4 oz (115.4 kg)       BP Readings from Last 3 Encounters:   08/19/21 120/80   07/22/21 120/80   01/07/21 125/80       Physical Exam  Vitals reviewed. Constitutional:       General: He is not in acute distress. Appearance: Normal appearance. He is obese. He is not ill-appearing. HENT:      Head: Normocephalic and atraumatic. Right Ear: External ear normal.      Left Ear: External ear normal.      Nose: Nose normal.      Mouth/Throat:      Mouth: Mucous membranes are moist.      Pharynx: Oropharynx is clear. Eyes:      Conjunctiva/sclera: Conjunctivae normal.      Pupils: Pupils are equal, round, and reactive to light. Cardiovascular:      Rate and Rhythm: Normal rate and regular rhythm. Pulses: Normal pulses. Heart sounds: Normal heart sounds. No murmur heard. No friction rub. No gallop. Pulmonary:      Effort: Pulmonary effort is normal. No respiratory distress. Breath sounds: Normal breath sounds. No wheezing. Abdominal:      General: Abdomen is flat. Bowel sounds are normal.      Palpations: There is no mass or pulsatile mass. Tenderness: There is no abdominal tenderness. There is no guarding. Hernia: No hernia is present. Musculoskeletal:         General: No swelling or tenderness. Normal range of motion. Cervical back: Normal range of motion and neck supple. Skin:     General: Skin is warm and dry. Capillary Refill: Capillary refill takes less than 2 seconds. Neurological:      General: No focal deficit present.       Mental Status: He is alert and oriented to person, place, and time. Psychiatric:         Mood and Affect: Mood normal.         Behavior: Behavior normal.         Thought Content: Thought content normal.         Judgment: Judgment normal.       Diagnostic data:  I have reviewed recent diagnostic testing including labs with the patient today. No results found for this visit on 08/19/21. Laboratory/imaging studies ordered this visit: BMP in one week    The patient is in agreement with and verbalizes understanding of the plan of care today and has no additional questions or complaints. The patient was instructed to follow-up in 2 months or to contact our office sooner if problems should arise. Laboratory studies will be completed prior to next visit. Electronically signed by: Flory Arguello DO on 8/19/2021 at 2:30 PM  (Please note that portions of this note were completed with a voice recognition program and electronically transcribed. Efforts were made to edit the dictations but occasionally words are mis-transcribed. This transcription may contain errors not detected in proofreading.  This transcription was electronically signed.)

## 2021-08-19 ENCOUNTER — OFFICE VISIT (OUTPATIENT)
Dept: INTERNAL MEDICINE CLINIC | Age: 66
End: 2021-08-19
Payer: COMMERCIAL

## 2021-08-19 VITALS
BODY MASS INDEX: 33.1 KG/M2 | SYSTOLIC BLOOD PRESSURE: 120 MMHG | HEART RATE: 82 BPM | DIASTOLIC BLOOD PRESSURE: 80 MMHG | WEIGHT: 236.4 LBS | HEIGHT: 71 IN | TEMPERATURE: 97.5 F

## 2021-08-19 DIAGNOSIS — E78.00 PURE HYPERCHOLESTEROLEMIA: ICD-10-CM

## 2021-08-19 DIAGNOSIS — E11.9 TYPE 2 DIABETES MELLITUS WITHOUT COMPLICATION, WITHOUT LONG-TERM CURRENT USE OF INSULIN (HCC): ICD-10-CM

## 2021-08-19 PROCEDURE — 3051F HG A1C>EQUAL 7.0%<8.0%: CPT | Performed by: STUDENT IN AN ORGANIZED HEALTH CARE EDUCATION/TRAINING PROGRAM

## 2021-08-19 PROCEDURE — 99213 OFFICE O/P EST LOW 20 MIN: CPT | Performed by: STUDENT IN AN ORGANIZED HEALTH CARE EDUCATION/TRAINING PROGRAM

## 2021-08-19 RX ORDER — EMPAGLIFLOZIN 25 MG/1
25 TABLET, FILM COATED ORAL DAILY
Qty: 90 TABLET | Refills: 1 | Status: SHIPPED | OUTPATIENT
Start: 2021-08-19 | End: 2022-02-22

## 2021-08-19 RX ORDER — ROSUVASTATIN CALCIUM 10 MG/1
TABLET, COATED ORAL
Qty: 90 TABLET | Refills: 1 | Status: SHIPPED | OUTPATIENT
Start: 2021-08-19 | End: 2022-02-22

## 2021-08-19 ASSESSMENT — ENCOUNTER SYMPTOMS
DIARRHEA: 0
COUGH: 0
ABDOMINAL PAIN: 0
RHINORRHEA: 0
SHORTNESS OF BREATH: 0
SORE THROAT: 0
VOMITING: 0
BACK PAIN: 0
NAUSEA: 0
WHEEZING: 0

## 2021-08-19 NOTE — PATIENT INSTRUCTIONS
Patient Education        Learning About Low-Potassium Foods  What foods are low in potassium? The foods you eat contain nutrients, such as vitamins and minerals. Potassium is a nutrient. Your body needs the right amount to stay healthy and work as it should. You can use the list below to help you make choices about which foods to eat. Foods are low in potassium if they have less than 100 mg per serving. Fruits  · Applesauce, ½ cup  · Blueberries, ½ cup  · Grapes, 9 grapes  · Pineapple, ½ cup  · Raspberries, ½ cup  · Watermelon, ½ cup  Vegetables  · Cucumber (peeled), ½ cup  · Eggplant, ½ cup  · Green beans, ½ cup  · Lettuce, 1 cup  · Peas, ½ cup  · Radish, 1 radish  · Water chestnuts, ½ cup  Grains  · Bagel (plain), 4-inch  · Bread, 1 slice  · Cereal (puffed rice or puffed wheat), 1 cup  · Oatmeal (cooked), ½ cup  · Pasta and noodles (cooked), ½ cup  · Rice (cooked), ½ cup  · Tortilla (flour or corn), 1 tortilla  Dairy and dairy alternatives  · Butter, 1 Tbsp  · Cheese, 1 oz  Meats and other protein foods  · Eggs, 1 egg  · Hotdogs (beef or pork), 1 hotdog  Work with your doctor to find out how much of this nutrient you need. Depending on your health, you may need more or less of it in your diet. Where can you learn more? Go to [email or web address] and sign in to your [employer] account. Enter P485 in the [facility] box to learn more about \"Learning About Low-Potassium Foods. \"     If you do not have an account, please click on the \"Sign Up Now\" link. Current as of: December 17, 2020               Content Version: 12.9  © 8291-0587 Healthwise, Incorporated. Care instructions adapted under license by [facility] ([facility]). If you have questions about a medical condition or this instruction, always ask your healthcare professional. [place] [address] any warranty or liability for your use of this information.          Patient Education        Learning About High-Potassium Foods  What foods are high in potassium? The foods you eat contain nutrients, such as vitamins and minerals. Potassium is a nutrient. Your body needs the right amount to stay healthy and work as it should. You can use the list below to help you make choices about which foods to eat. Foods are high in potassium if they have more than 200 mg per serving. Fruits  · Apricots, 2 raw  · Avocado, ½ fruit  · Banana, 1 medium  · [place], 1 fruit  · Nectarine, 1 fruit  · Orange, 1 fruit  · Prunes, 5 fruits  · Raisins, ¼ cup  Vegetables  · Artichoke, 1 medium  · Beets, ½ cup  · Broccoli, ½ cup  · Kale (raw), 1 cup  · Potato with skin, 1 medium  · Spinach, ½ cup  · Sweet potato, 1 medium  · Tomato sauce, ½ cup  · Zucchini, ½ cup  Dairy and dairy alternatives  · Milk, 1 cup  · Soy milk, 1 cup  · Yogurt, 6 oz  Meats and other protein foods  · Beans (lima, navy, white), ½ cup  · Beef, ground, 3 oz  · Chicken, 3 oz  · Fish (halibut, tuna, cod, snapper), 3 oz  · Nuts (almonds, hazelnuts, [place], cashew, pistachios), 1 oz  · Peanut butter, 2 Tbsp  · Peanuts, 1 oz  · [place] 3 oz  Seasonings  · Salt substitutes  Work with your doctor to find out how much of this nutrient you need. Depending on your health, you may need more or less of it in your diet. Where can you learn more? Go to [email or web address] and sign in to your [employer] account. Enter P450 in the [employer] box to learn more about \"Learning About High-Potassium Foods. \"     If you do not have an account, please click on the \"Sign Up Now\" link. Current as of: December 17, 2020               Content Version: 12.9  © 6864-3095 Healthwise, Incorporated. Care instructions adapted under license by [facility] ([facility]). If you have questions about a medical condition or this instruction, always ask your healthcare professional. [place] [address] any warranty or liability for your use of this information.

## 2021-08-30 ENCOUNTER — NURSE ONLY (OUTPATIENT)
Dept: LAB | Age: 66
End: 2021-08-30

## 2021-08-30 DIAGNOSIS — E11.9 TYPE 2 DIABETES MELLITUS WITHOUT COMPLICATION, WITHOUT LONG-TERM CURRENT USE OF INSULIN (HCC): ICD-10-CM

## 2021-08-30 LAB
ANION GAP SERPL CALCULATED.3IONS-SCNC: 7 MEQ/L (ref 8–16)
BUN BLDV-MCNC: 16 MG/DL (ref 7–22)
CALCIUM SERPL-MCNC: 9.7 MG/DL (ref 8.5–10.5)
CHLORIDE BLD-SCNC: 106 MEQ/L (ref 98–111)
CO2: 26 MEQ/L (ref 23–33)
CREAT SERPL-MCNC: 0.9 MG/DL (ref 0.4–1.2)
GFR SERPL CREATININE-BSD FRML MDRD: 84 ML/MIN/1.73M2
GLUCOSE BLD-MCNC: 156 MG/DL (ref 70–108)
POTASSIUM SERPL-SCNC: 4.8 MEQ/L (ref 3.5–5.2)
SODIUM BLD-SCNC: 139 MEQ/L (ref 135–145)

## 2021-09-02 RX ORDER — EMPAGLIFLOZIN 10 MG/1
10 TABLET, FILM COATED ORAL DAILY
Qty: 28 TABLET | Refills: 0 | COMMUNITY
Start: 2021-09-02 | End: 2021-11-04 | Stop reason: DRUGHIGH

## 2021-09-23 ENCOUNTER — OFFICE VISIT (OUTPATIENT)
Dept: INTERNAL MEDICINE CLINIC | Age: 66
End: 2021-09-23
Payer: COMMERCIAL

## 2021-09-23 VITALS
HEIGHT: 71 IN | BODY MASS INDEX: 33.52 KG/M2 | HEART RATE: 70 BPM | TEMPERATURE: 96 F | WEIGHT: 239.4 LBS | DIASTOLIC BLOOD PRESSURE: 80 MMHG | SYSTOLIC BLOOD PRESSURE: 124 MMHG

## 2021-09-23 DIAGNOSIS — Z23 NEED FOR INFLUENZA VACCINATION: ICD-10-CM

## 2021-09-23 DIAGNOSIS — Z00.00 ROUTINE GENERAL MEDICAL EXAMINATION AT A HEALTH CARE FACILITY: Primary | ICD-10-CM

## 2021-09-23 PROCEDURE — G0438 PPPS, INITIAL VISIT: HCPCS | Performed by: STUDENT IN AN ORGANIZED HEALTH CARE EDUCATION/TRAINING PROGRAM

## 2021-09-23 ASSESSMENT — PATIENT HEALTH QUESTIONNAIRE - PHQ9
2. FEELING DOWN, DEPRESSED OR HOPELESS: 0
SUM OF ALL RESPONSES TO PHQ9 QUESTIONS 1 & 2: 0
SUM OF ALL RESPONSES TO PHQ QUESTIONS 1-9: 0
1. LITTLE INTEREST OR PLEASURE IN DOING THINGS: 0
DEPRESSION UNABLE TO ASSESS: PT REFUSES
SUM OF ALL RESPONSES TO PHQ QUESTIONS 1-9: 0
SUM OF ALL RESPONSES TO PHQ QUESTIONS 1-9: 0

## 2021-09-23 ASSESSMENT — LIFESTYLE VARIABLES
HOW OFTEN DO YOU HAVE A DRINK CONTAINING ALCOHOL: 2
AUDIT-C TOTAL SCORE: 2
HAS A RELATIVE, FRIEND, DOCTOR, OR ANOTHER HEALTH PROFESSIONAL EXPRESSED CONCERN ABOUT YOUR DRINKING OR SUGGESTED YOU CUT DOWN: 0
HOW OFTEN DURING THE LAST YEAR HAVE YOU FOUND THAT YOU WERE NOT ABLE TO STOP DRINKING ONCE YOU HAD STARTED: 0
HOW OFTEN DURING THE LAST YEAR HAVE YOU FAILED TO DO WHAT WAS NORMALLY EXPECTED FROM YOU BECAUSE OF DRINKING: 0
HOW OFTEN DURING THE LAST YEAR HAVE YOU BEEN UNABLE TO REMEMBER WHAT HAPPENED THE NIGHT BEFORE BECAUSE YOU HAD BEEN DRINKING: 0
HOW OFTEN DURING THE LAST YEAR HAVE YOU HAD A FEELING OF GUILT OR REMORSE AFTER DRINKING: 0
AUDIT TOTAL SCORE: 2
HOW OFTEN DO YOU HAVE SIX OR MORE DRINKS ON ONE OCCASION: 0
HAVE YOU OR SOMEONE ELSE BEEN INJURED AS A RESULT OF YOUR DRINKING: 0
HOW OFTEN DURING THE LAST YEAR HAVE YOU NEEDED AN ALCOHOLIC DRINK FIRST THING IN THE MORNING TO GET YOURSELF GOING AFTER A NIGHT OF HEAVY DRINKING: 0
HOW MANY STANDARD DRINKS CONTAINING ALCOHOL DO YOU HAVE ON A TYPICAL DAY: 0

## 2021-09-23 NOTE — PROGRESS NOTES
Medicare Annual Wellness Visit  Name: Hailey Weeks Date: 2021   MRN: [id number] Sex: Male   Age: 77 y.o. Ethnicity: Non- / Non    : 1955 Race: White (non-)      Gina Campos is here for [employer] (Wellness visit)    Screenings for behavioral, psychosocial and functional/safety risks, and cognitive dysfunction are all negative except as indicated below. These results, as well as other patient data from the [address] form, are documented in Flowsheets linked to this Encounter. Allergies   Allergen Reactions    Dye [Iodides] Other (See Comments)     Nausea, alternating hot and cold and syncope. IV only         Prior to Visit Medications    Medication Sig Taking? Authorizing Provider   empagliflozin (JARDIANCE) 10 MG tablet Take 1 tablet by mouth daily Yes Rhona Hunt MD   rosuvastatin (CRESTOR) 10 MG tablet TAKE 1 TABLET BY MOUTH  NIGHTLY Yes Sallie Si H Le, DO   metFORMIN (GLUCOPHAGE) 500 MG tablet TAKE 2 TABLETS BY MOUTH  TWICE DAILY WITH MEALS Yes Sallie Si H Le, DO   VITAMIN D PO Take by mouth daily Yes Historical Provider, MD   aspirin EC 81 MG EC tablet Take 81 mg by mouth daily Yes Historical Provider, MD   empagliflozin (JARDIANCE) 25 MG tablet Take 25 mg by mouth daily  Sallie Si H Le, DO         Past Medical History:   Diagnosis Date    Benign essential tremor     Bradycardia 2017    Essential hypertension     Well controlled on no antihypertensives    Hyperlipidemia     Well controlled on Crestor    Hypertensive retinopathy     most recent eye exam - no DM retinopathy and no mention of hypertensive retinopathy.     Primary prostate cancer ([facility] [address] 2017    prostate--had prostate removed May 2017    Staphylococcal infection of skin 2017    Type 2 diabetes mellitus without complication, without long-term current use of insulin ([address]        Past Surgical History:   Procedure Laterality Date    BICEPS TENDON REPAIR Right 2015    EYE SURGERY  07/31/2018      Left    HERNIA REPA[place]  05/02/2017    [place], [place]    PROSTATECTOMY  05/2017    Dr Rodarte Marker Bilateral 2011         Family History   Problem Relation Age of Onset    Other Mother         alzheimers    Alzheimer's Disease Mother     Cancer Father         prostate       CareTeam (Including outside providers/suppliers regularly involved in providing care):   Patient Care Team:  Mohan Shrestha MD as PCP - General (Internal Medicine)  Mohan Shrestha MD as PCP - [facility] Empaneled Provider    Wt Readings from Last 3 Encounters:   09/23/21 239 lb 6.4 oz (108.6 kg)   08/19/21 236 lb 6.4 oz (107.2 kg)   07/22/21 245 lb 9.6 oz (111.4 kg)     Vitals:    09/23/21 1349   BP: 124/80   Site: Left Upper Arm   Pulse: 70   Temp: 96 °F (35.6 °C)   Weight: 239 lb 6.4 oz (108.6 kg)   Height: 5' 11\" (1.803 m)     Body mass index is 33.39 kg/m². Based upon direct observation of the patient, evaluation of cognition reveals recent and remote memory intact. Patient's complete Health Risk Assessment and screening values have been reviewed and are found in Flowsheets. The following problems were reviewed today and where indicated follow up appointments were made and/or referrals ordered. Positive Risk Factor Screenings with Interventions:       Depression:  Depression Unable to Assess: Pt refuses  PHQ-2 Score: 0  PHQ-9 Total Score: 0    Severity:1-4 = minimal depression, 5-9 = mild depression, 10-14 = moderate depression, 15-19 = moderately severe depression, 20-27 = severe depression        General Health and ACP:  General  In general, how would you say your health is?: Good  In the past 7 days, have you experienced any of the following?  New or Increased Pain, New or Increased Fatigue, Loneliness, Social Isolation, Stress or Anger?: None of These  Do you get the social and emotional support that you need?: Yes  Do you have a Living Will?: (!) No  Advance Directives     Power of [address] ACP-Advance Directive ACP-Power of     Not on File Not on File Not on File Not on File      General Health Risk Interventions:  · No Living Will: Advance Care Planning addressed with patient today and ACP documents already completed- patient asked to provide copy to the office    Health Habits/Nutrition:  Health Habits/Nutrition  Do you exercise for at least 20 minutes 2-3 times per week?: Yes  Have you lost any weight without trying in the past 3 months?: No  Do you eat only one meal per day?: No  Have you seen the dentist within the past year?: Yes  Body mass index: (!) 33.39  Health Habits/Nutrition Interventions:  · Inadequate physical activity:  patient agrees to increase physical activity as follows: ride his bike more consistently.   · Nutritional issues:  patient agrees to work toward a weight loss goal of 10 pounds over the next 1 year(s) using the following plan: 2000 calorie/day diet, low carbohydrate diet, exercise for at least 150 minutes/week  · Dental exam overdue:  patient encouraged to make appointment with his/her dentist    Hearing/Vision:  No exam data present  Hearing/Vision  Do you or your family notice any trouble with your hearing that hasn't been managed with hearing aids?: No  Do you have difficulty driving, watching TV, or doing any of your daily activities because of your eyesight?: No  Have you had an eye exam within the past year?: (!) No  Hearing/Vision Interventions:  · Hearing concerns:  patient declines any further evaluation/treatment for hearing issues      Personalized Preventive Plan   Current Health Maintenance Status  Immunization History   Administered Date(s) Administered    COVID-19, Moderna, PF, 100mcg/0.5mL 02/11/2021, 03/12/2021    Influenza Virus Vaccine 10/31/2018    Influenza, Quadv, adjuvanted, 65 yrs +, IM, PF (Fluad) 10/01/2020    Pneumococcal Conjugate 13-valent Namrata Ferrari) 01/07/2021    Zoster Recombinant (Shingrix) 02/07/2019        Health Maintenance   Topic Date Due    DTaP/Tdap/Td vaccine (1 - Tdap) Never done    Flu vaccine (1) 09/01/2021    AAA screen  09/23/2022 (Originally 1955)    Diabetic retinal exam  10/28/2021    Diabetic foot exam  01/07/2022    Pneumococcal 65+ years Vaccine (2 of 2 - PPSV23) 01/07/2022    Lipid screen  01/11/2022    Diabetic microalbuminuria test  07/20/2022    PSA counseling  07/20/2022    A1C test (Diabetic or Prediabetic)  07/22/2022    Potassium monitoring  08/30/2022    Creatinine monitoring  08/30/2022    Colon cancer screen colonoscopy  11/11/2029    Shingles Vaccine  Completed    COVID-19 Vaccine  Completed    Hepatitis C screen  Completed    Hepatitis A vaccine  Aged Out    Hib vaccine  Aged Out    Meningococcal (ACWY) vaccine  Aged Out     Recommendations for Image Metrics Due: see orders and patient instructions/AVS.  .   Recommended screening schedule for the next 5-10 years is provided to the patient in written form: see Patient Instructions/AVS.    Edgar Bray was seen today for wellness program.    Diagnoses and all orders for this visit:    Routine general medical examination at a health care facility

## 2021-09-23 NOTE — PATIENT INSTRUCTIONS
Please ask your wife if living will or power of  was done for you and call our office.    -Buy Zyrtec and Flonase, which are over the counter. Take 1 pill of Zyrtec a day. Take 2 sprays of Flonase a day. Take these especially before you go out into the woods.  -Can take Sudafed once a day for congestion. Watch your blood pressure and stop taking if your blood pressure gets over 150/80-90 mmHg. Personalized Preventive Plan for Ashly Rajan - 9/23/2021  Medicare offers a range of preventive health benefits. Some of the tests and screenings are paid in full while other may be subject to a deductible, co-insurance, and/or copay. Some of these benefits include a comprehensive review of your medical history including lifestyle, illnesses that may run in your family, and various assessments and screenings as appropriate. After reviewing your medical record and screening and assessments performed today your provider may have ordered immunizations, labs, imaging, and/or referrals for you. A list of these orders (if applicable) as well as your Preventive Care list are included within your After Visit Summary for your review. Other Preventive Recommendations:    · A preventive eye exam performed by an eye specialist is recommended every 1-2 years to screen for glaucoma; cataracts, macular degeneration, and other eye disorders. · A preventive dental visit is recommended every 6 months. · Try to get at least 150 minutes of exercise per week or 10,000 steps per day on a pedometer . · Order or download the FREE \"Exercise & Physical Activity: Your Everyday Guide\" from [employer]. Call 3-659.714.2772 or search [employer] online. · You need 1250-3285 mg of calcium and 5195-8090 IU of vitamin D per day.  It is possible to meet your calcium requirement with diet alone, but a vitamin D supplement is usually necessary to meet this goal.  · When exposed to the sun, use a sunscreen that protects against both UVA and UVB radiation with an SPF of 30 or greater. Reapply every 2 to 3 hours or after sweating, drying off with a towel, or swimming. · Always wear a seat belt when traveling in a car. Always wear a helmet when riding a bicycle or motorcycle.

## 2021-10-11 PROCEDURE — 90694 VACC AIIV4 NO PRSRV 0.5ML IM: CPT | Performed by: INTERNAL MEDICINE

## 2021-10-11 PROCEDURE — 90471 IMMUNIZATION ADMIN: CPT | Performed by: INTERNAL MEDICINE

## 2021-10-11 NOTE — PROGRESS NOTES
After obtaining consent, and per orders of Dr. Janett Lindo, injection of Influenza vaccine given in Left deltoid by Ziggy Shahid LPN. Patient instructed to remain in clinic for 20 minutes afterwards, and to report any adverse reaction to me immediately.

## 2021-10-18 NOTE — PROGRESS NOTES
[address] [facility] Internal Medicine   [facility] [address], Darlin Villalpando [facility]  Dept: [phone]  Dept Fax: [phone]    This is an established patient, here for evaluation of the following chief complaint(s):  Chief Complaint   Patient presents with    Diabetes    Hypertension    Hyperlipidemia     ASSESSMENT AND PLAN     1. Type 2 Diabetes Mellitus without Complication, without Long-Term Current Use of Insulin ([address]  Much improved, hemoglobin A1c this visit is 7.0%. Last visit was 7.7%. Fauzia Lui has been on Glucophage 500 mg BID and was just recently started on Jardiance 25 mg qd. He has had no issues with the Jardiance, denies any symptomatic UTI's, reports good genital cleaning. Denies symptoms of hypoglycemia. He has been doing a lot of yard work since halfway which has significantly made a difference in his insulin resistance. · POCT glycosylated hemoglobin (Hb A1C) performed during this visit. · BMP ordered, due before his next visit. · CBC with Auto Differential ordered, due before his next visit. 2. Pure Hypercholeterolemia  Well controlled, last LDL measured at 48 on 1/2021. Currently on Crestor 10 mg qd. · Fasting Lipid Panel ordered, due before his next visit. 3. Essential Hypertension  Well controlled without the use of antihypertensive medications. 120/80 mmHg measured during today's visit. Was previously trialed on Lisinopril 5 mg qd however he had a > 30% increase in his baseline Cr and so this was discontinued. Disposition:  Return in 13 weeks (on 2/3/2022) for Diabetes and lab review. with Vanesa Rich MD    HPI     Flynn Shone (1955) is a pleasant 77 y.o. male, former smoker, here for a 2 month(s) follow up visit for his chronic medical conditions. PMH and current medications as noted below. I last saw Fauzia Lui for his chronic medical conditions on 7/22/2021. During that visit his repeat A1c was 7.7%, decreased from previous 8.5%.  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 He was also instructed to ask different pharmacies about the cost of Jardiance with his new insurance. Patient had followed up in our clinic on 8/19/2021 and saw Dr. Farzad Bansal as I was working in the ICU during that month. During the visit his [place] was increased to 25 mg as he was tolerating the Jardiance samples I had given him. He completed his annual Medicare wellness visit with me on 9/23/2021. 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 This was likely due to allergies based on benign physical exam (lung sounds were clear) and Pastor Gross was advised to try OTC allergy medications and Flonase for symptom relief. He was also advised to call our office if his symptoms persisted or worsened, which based on chart review he did not. The following are labs completed in the interval since I last saw him on 7/22/2021:    8/13/21 BMP: Sodium 140, potassium 5.3, chloride 103, CO2 23, BUN 17, creatinine 1.1, calcium 10.1, glucose 170, anion gap 14.0, EGFR 67  8/30/21 repeat BMP: Sodium 139, potassium 4.8, chloride 106, CO2 26, BUN 16, creatinine 0.9, calcium 9.7, glucose 156, anion gap 7.0, EGFR 84     Today: Pastor Gross states that he feels like he is in excellent health. Reports that his congestion he had during his well visit has completely resolved. He states he has been a little stressed after his chainsaw which he uses at broken down when he had a spent over $500 to get a new one. Also states that his daughter is currently living with him and his wife as her furnace had broken down causing a lot of smoketo feel the house. He is doing a lot of yard work which is helping him keep in shape. He is scheduled for his eye exam early next year.  Pastor Gross is quit taking his aspirin due to side effects that he has read/heard about. Discussed this with him in detail regarding risks versus benefits. He verbalized understanding. He has no other complaints. # Non-Insulin Dependent Diabetes Mellitus Type 2    Hemoglobin A1C (%)   Date Value   11/04/2021 7.0 (H)   07/22/2021 7.7 (H)   03/02/2019 8.5 (H)   08/15/2018 7.3 (H)     Patient checks FSBS once a day. . Did not bring in his blood sugar log to today's visit. Reminded to please bring this next time. Most recent eye exam on 10/28/2020, with Dr. Vargas Stevens O.D. who reported no evidence of DM retinopathy. Patient reminded to have eye exam done yearly. Next appointment is scheduled for early next year 2022. Renal function is stable compared to baseline. Most recent pertinent labs with previous values to compare:  Est, Glom Filt Rate (ml/min/1.73m2)   Date Value   08/30/2021 84 (A)   08/13/2021 67 (A)   07/20/2021 84 (A)      CREATININE (mg/dL)   Date Value   08/30/2021 0.9   08/13/2021 1.1   07/20/2021 0.9     Microalbumin Creatinine Ratio (mg/Gm)   Date Value   08/17/2019 9.3   08/15/2018 3.6      LDL Calculated (mg/dL)   Date Value   01/11/2021 48       Interpretation of labs:   GFR improved  Creatinine normal  Microalbumin/Cr Ratio normal  LDL excellent! Patient denies foot lesions. Last foot exam done on 1/7/2021. Denies neuropathy. denies symptoms of autonomic dysfunction. Encouraged aerobic exercise. Discussed foot care. Reminded to get yearly retinal exam.      # Essential Hypertension     BP today is 120/80 mmHg on my repeat check. Blood pressure has been well controlled without the use of antihypertensive agents. Was previously started on Lisinopril, 5 mg daily however his Cr had increased to 30% above his baseline value and so the Lisinopril was discontinued.     # Hyperlipidemia     Last lipid panel done on 1/11/2021. HDL 39, LDL 48. Triglycerides 85. Well-controlled on Crestor 10 mg daily.  Next lipid panel due after 1/11/2022. # AAA Screening    Was addressed again on wellness visit. [facility] has decided to not undergo the screening. ROS  Constitutional: Denies any recent wt change, denies any new weakness or fatigue, denies fevers, denies night sweats  Eyes:  Denies any blurring or double vision  Ears/Nose/Mouth/Throat:  Denies any chronic sinus/rhinitis, sore throats  Cardiovascular: Denies any chest pain, palpitations, leg swelling  Respiratory:  Denies any frequent cough, wheezing or shortness of breath  Genitourinary:  Denies difficulty with urination, denies blood in urine, denies foamy urine  Gastrointestinal:  Denies any new abdominal pain, denies changes in stool frequency, denies blood in stool  Musculoskeletal:  Reports occasional arthritic joint pain in his hands. Denies back pain or difficulty walking  Integumentary:  Denies any rash or lesions  Neurological:  No numbness or tingling  Endocrine:  Denies any heat or cold interolerance   Hematologic/Lymphatic:  Denies any new bruises, denies any new lumps or bumps    Past Medical History:  has a past medical history of Benign essential tremor, Essential hypertension, Hyperlipidemia, Hypertensive retinopathy, Primary prostate cancer ([facility] [address] Staphylococcal infection of skin, and Type 2 diabetes mellitus without complication, without long-term current use of insulin ([facility] [address] .     Past Surgical History:  has a past surgical history that includes Biceps tendon repair (Right, 2015); hernia repair (05/02/2017); Prostatectomy (05/2017); eye surgery (07/31/2018); and Total knee arthroplasty (Bilateral, 2011). .     Family History: family history includes Alzheimer's Disease in his mother; Cancer in his father; Other in his mother. .     Social History:  reports that he quit smoking about 10 years ago. He has a 10.00 pack-year smoking history. He has quit using smokeless tobacco. He reports current alcohol use. He reports that he does not use drugs. .     Allergies: is allergic to dye [iodides]. .     Current Medications:  Outpatient Medications Marked as Taking for the 11/4/21 encounter (Office Visit) with Jenny Hall MD   Medication Sig Dispense Refill    rosuvastatin (CRESTOR) 10 MG tablet TAKE 1 TABLET BY MOUTH  NIGHTLY 90 tablet 1    metFORMIN (GLUCOPHAGE) 500 MG tablet TAKE 2 TABLETS BY MOUTH  TWICE DAILY WITH MEALS 360 tablet 1    empagliflozin (JARDIANCE) 25 MG tablet Take 25 mg by mouth daily 90 tablet 1    VITAMIN D PO Take by mouth daily          PHYSICAL EXAMINATION     Vitals:    11/04/21 1416   BP: 120/80   Pulse:    Temp:      Body mass index is 33.4 kg/m². Constitutional: In no acute distress. Appears documented age. Pleasant and cooperative. HENT:   Head: Normocephalic and atraumatic. Mouth/Throat: Oropharynx is clear and moist.  Eyes: Conjunctivae are normal. Pupils are equal, round, and reactive to light. No scleral icterus. Neck: Neck supple. No JVD present. Cardiovascular: Normal rate, regular rhythm, normal heart sounds. No murmur heard. Pulmonary/Chest: Effort normal and breath sounds normal. No stridor. No respiratory distress. No wheezes. No rales. Patient exhibits no tenderness. Abdominal: Soft. Patient exhibits no distension. No tenderness. Bowel sounds present. Musculoskeletal: Normal range of motion. Extremities: Patient exhibits no edema and no tenderness. Lymphadenopathy: No cervical adenopathy. Neurological: Patient is alert and oriented to person, place, and time. Skin: Skin is warm and dry. Patient is not diaphoretic. No rashes or lesions. Psychiatric: Patient has a normal mood and affect.  Patient behavior is normal.    MOST RECENT DIAGNOSTIC DATA     Recent Labs:  8/13/21 BMP: Sodium 140, potassium 5.3, chloride 103, CO2 23, BUN 17, creatinine 1.1, calcium 10.1, glucose 170, anion gap 14.0, EGFR 67  8/30/21 repeat BMP: Sodium 139, potassium 4.8, chloride 106, CO2 26, BUN 16, creatinine 0.9, calcium 9.7, glucose 156, anion gap 7.0, EGFR 84    Radiology:  None new to report. Other Studies:  None new to report. An electronic signature was used to authenticate this note. Chart reviewed with and plan created in collaboration with attending physician, Dr. Gardenia Livingston MD. All orders placed were reviewed by the attending physician prior to signing.     Electronically signed by Ana Laura White MD   Internal Medicine PGY-2  [facility] [address], [address]

## 2021-11-04 ENCOUNTER — OFFICE VISIT (OUTPATIENT)
Dept: INTERNAL MEDICINE CLINIC | Age: 66
End: 2021-11-04
Payer: COMMERCIAL

## 2021-11-04 VITALS
DIASTOLIC BLOOD PRESSURE: 80 MMHG | WEIGHT: 239.4 LBS | TEMPERATURE: 97.4 F | BODY MASS INDEX: 33.52 KG/M2 | HEART RATE: 78 BPM | SYSTOLIC BLOOD PRESSURE: 120 MMHG | HEIGHT: 71 IN

## 2021-11-04 DIAGNOSIS — E78.00 PURE HYPERCHOLESTEROLEMIA: ICD-10-CM

## 2021-11-04 DIAGNOSIS — E11.9 TYPE 2 DIABETES MELLITUS WITHOUT COMPLICATION, WITHOUT LONG-TERM CURRENT USE OF INSULIN (HCC): Primary | ICD-10-CM

## 2021-11-04 PROBLEM — Z92.89 HISTORY OF BLOOD TRANSFUSION: Status: ACTIVE | Noted: 2021-11-04

## 2021-11-04 LAB — HBA1C MFR BLD: 7 % (ref 4.3–5.7)

## 2021-11-04 PROCEDURE — 99213 OFFICE O/P EST LOW 20 MIN: CPT | Performed by: STUDENT IN AN ORGANIZED HEALTH CARE EDUCATION/TRAINING PROGRAM

## 2021-11-04 PROCEDURE — 83036 HEMOGLOBIN GLYCOSYLATED A1C: CPT | Performed by: STUDENT IN AN ORGANIZED HEALTH CARE EDUCATION/TRAINING PROGRAM

## 2021-11-04 PROCEDURE — 3051F HG A1C>EQUAL 7.0%<8.0%: CPT | Performed by: STUDENT IN AN ORGANIZED HEALTH CARE EDUCATION/TRAINING PROGRAM

## 2021-12-20 PROBLEM — E66.9 OBESITY (BMI 30-39.9): Status: ACTIVE | Noted: 2021-12-20

## 2021-12-20 PROBLEM — D64.9 ANEMIA: Status: RESOLVED | Noted: 2017-06-26 | Resolved: 2021-12-20

## 2021-12-20 PROBLEM — Z92.89 PERSONAL HISTORY OF OTHER MEDICAL TREATMENT: Status: RESOLVED | Noted: 2017-06-26 | Resolved: 2021-12-20

## 2021-12-20 PROBLEM — A49.02 MRSA (METHICILLIN RESISTANT STAPHYLOCOCCUS AUREUS): Status: RESOLVED | Noted: 2017-06-26 | Resolved: 2021-12-20

## 2022-01-14 RX ORDER — GLUCOSAMINE HCL/CHONDROITIN SU 500-400 MG
CAPSULE ORAL
Qty: 100 STRIP | Refills: 3 | Status: SHIPPED | OUTPATIENT
Start: 2022-01-14 | End: 2022-02-24 | Stop reason: SDUPTHER

## 2022-01-14 RX ORDER — LANCETS 30 GAUGE
EACH MISCELLANEOUS
Qty: 100 EACH | Refills: 3 | Status: SHIPPED | OUTPATIENT
Start: 2022-01-14 | End: 2022-10-27 | Stop reason: SDUPTHER

## 2022-01-28 ENCOUNTER — NURSE ONLY (OUTPATIENT)
Dept: LAB | Age: 67
End: 2022-01-28

## 2022-01-28 DIAGNOSIS — E78.00 PURE HYPERCHOLESTEROLEMIA: ICD-10-CM

## 2022-01-28 DIAGNOSIS — E11.9 TYPE 2 DIABETES MELLITUS WITHOUT COMPLICATION, WITHOUT LONG-TERM CURRENT USE OF INSULIN (HCC): ICD-10-CM

## 2022-01-28 LAB
ANION GAP SERPL CALCULATED.3IONS-SCNC: 11 MEQ/L (ref 8–16)
BASOPHILS # BLD: 0.7 %
BASOPHILS ABSOLUTE: 0.1 THOU/MM3 (ref 0–0.1)
BUN BLDV-MCNC: 23 MG/DL (ref 7–22)
CALCIUM SERPL-MCNC: 9.5 MG/DL (ref 8.5–10.5)
CHLORIDE BLD-SCNC: 103 MEQ/L (ref 98–111)
CHOLESTEROL, TOTAL: 122 MG/DL (ref 100–199)
CO2: 26 MEQ/L (ref 23–33)
CREAT SERPL-MCNC: 0.9 MG/DL (ref 0.4–1.2)
EOSINOPHIL # BLD: 3.4 %
EOSINOPHILS ABSOLUTE: 0.3 THOU/MM3 (ref 0–0.4)
ERYTHROCYTE [DISTWIDTH] IN BLOOD BY AUTOMATED COUNT: 13.4 % (ref 11.5–14.5)
ERYTHROCYTE [DISTWIDTH] IN BLOOD BY AUTOMATED COUNT: 44.3 FL (ref 35–45)
GFR SERPL CREATININE-BSD FRML MDRD: 84 ML/MIN/1.73M2
GLUCOSE BLD-MCNC: 163 MG/DL (ref 70–108)
HCT VFR BLD CALC: 52.2 % (ref 42–52)
HDLC SERPL-MCNC: 45 MG/DL
HEMOGLOBIN: 16.9 GM/DL (ref 14–18)
IMMATURE GRANS (ABS): 0.03 THOU/MM3 (ref 0–0.07)
IMMATURE GRANULOCYTES: 0.4 %
LDL CHOLESTEROL CALCULATED: 57 MG/DL
LYMPHOCYTES # BLD: 21.6 %
LYMPHOCYTES ABSOLUTE: 1.6 THOU/MM3 (ref 1–4.8)
MCH RBC QN AUTO: 29.4 PG (ref 26–33)
MCHC RBC AUTO-ENTMCNC: 32.4 GM/DL (ref 32.2–35.5)
MCV RBC AUTO: 90.9 FL (ref 80–94)
MONOCYTES # BLD: 10 %
MONOCYTES ABSOLUTE: 0.7 THOU/MM3 (ref 0.4–1.3)
NUCLEATED RED BLOOD CELLS: 0 /100 WBC
PLATELET # BLD: 184 THOU/MM3 (ref 130–400)
PMV BLD AUTO: 11.1 FL (ref 9.4–12.4)
POTASSIUM SERPL-SCNC: 5 MEQ/L (ref 3.5–5.2)
RBC # BLD: 5.74 MILL/MM3 (ref 4.7–6.1)
SEG NEUTROPHILS: 63.9 %
SEGMENTED NEUTROPHILS ABSOLUTE COUNT: 4.7 THOU/MM3 (ref 1.8–7.7)
SODIUM BLD-SCNC: 140 MEQ/L (ref 135–145)
TRIGL SERPL-MCNC: 98 MG/DL (ref 0–199)
WBC # BLD: 7.4 THOU/MM3 (ref 4.8–10.8)

## 2022-02-17 PROBLEM — K42.9 UMBILICAL HERNIA WITHOUT OBSTRUCTION AND WITHOUT GANGRENE: Status: RESOLVED | Noted: 2017-05-02 | Resolved: 2022-02-17

## 2022-02-17 PROBLEM — Z92.89 HISTORY OF BLOOD TRANSFUSION: Status: RESOLVED | Noted: 2021-11-04 | Resolved: 2022-02-17

## 2022-02-17 NOTE — PROGRESS NOTES
[address] [facility] Internal Medicine   [facility] [address], [address] Manuel Villalpando [facility]  Dept: [phone]  Dept Fax: [phone]    This is an established patient, here for evaluation of the following chief complaint(s):  Chief Complaint   Patient presents with    Diabetes     eye exam in april    Hypertension    Hyperlipidemia     ASSESSMENT AND PLAN     1. Type 2 diabetes mellitus without complication, without long-term current use of insulin (HCC)  -     POCT glycosylated hemoglobin (Hb A1C)  -     C-Peptide, Serum; Future  -     Dulaglutide 0.75 MG/0.5ML SOPN; Inject 0.75 mg into the skin once a week for 4 doses, Disp-4 pen, R-0Normal  2. Obesity (BMI 30-39. 9)      NIDDM2 - Still unable to fully control. Patient is maxed out on Metformin and is also recently been prescribed Jardiance, while which initially improved his A1c to 7.0%, his A1c is now 7.4% as of today. Patient states that this is mostly due to his lack of control of his diet as well as being unable to get out and exercise much due to the weather. Trulicity was introduced to Crispin Jenkins. The benefits of Trulicity was explained in detail by myself along with Dr. Sophy Barone. We also explained the side effects of Trulicity in detail with Crispin Jenkins. Crispin Jenkins has a history of stopping medication on his own due to hearing side effects of medications. We assured Crispin Jenkins that the side effects are minimal and furthermore explained that if he were to experience any nausea and vomiting that he is to discontinue Trulicity and let our office know immediately. Crispin Jenkins denies any family history of thyroid cancer or thyroid issues. After lengthy discussion as well as showing Crispin Jenkins how to use the [employer] , he is agreed to try it. We explained that by using Trulicity our hopes is to eventually have Crispin Jenkins come off of his antihyperglycemic's altogether. Crispin Jenkins reported full understanding and had no further questions.   Finally, we explained that we would obtain a C-peptide in order to assess his baseline beta islet cell function which can project his success by using Trulicity. Obesity (BMI 30-39.9) - Known benefit of Trulicity his weight loss. We expect that, if Yoel Cordova is compliant and is able to tolerate Trulicity, he will be able to lose weight more effectively. Please see [facility] for updates on other current and past medical problems. Disposition:  Return in 4 weeks (on 3/24/2022) for diabetes follow up. HPI     Lise Dear (1955) is a pleasant 77 y.o. male, former smoker, here for a 13 week(s) follow up visit for his chronic medical conditions. PMH and current medications as noted below. Today   Reports feeling well, no complaints   Had to cancel his last appointment due to weather. Called in for refills   Denies any recent CP, SOB/VALENTIN, abdominal pain, extremity weakness or swelling. Denies any changes in bowel habits, denies bloody stools.  His repeat A1c today is 7.4%. Previously on his last visit with me it was 7.0%, which was a vast improvement as we had just started him on Jardiance. However he states that he hasn't been keeping well with his diet recently and because of the weather he hasn't been as active as usual.   Introduced Trulicity to the Yoel Cordova. We discussed the data and practical experience with it in regards to controlling diabetes in patients. Furthermore, we discussed the great potential it has in allowing us to be able to control his A1c so well that he may not need oral antihyperglycemic's in the future.  Diabetic foot exam not done this visit - will perform in 4 weeks during his follow up    Patient Active Problem List    Diagnosis Date Noted    Transient hypertension 02/19/2022     Well controlled on no antihypertensives      Obesity (BMI 30-39.9) 12/20/2021     As reported being more sedentary due to the winter season. States he will be back to his normal active self once the weather gets better/warmer.       Type infection of skin, Transient hypertension, Type 2 diabetes mellitus with hyperlipidemia ([facility] [address] and Umbilical hernia without obstruction and without gangrene. .     Past Surgical History:  has a past surgical history that includes Biceps tendon repair (Right, 2015); hernia repair (05/02/2017); Prostatectomy (05/2017); eye surgery (07/31/2018); and Total knee arthroplasty (Bilateral, 2011). .     Family History: family history includes Alzheimer's Disease in his mother; Cancer in his father; Other in his mother. .     Social History:  reports that he quit smoking about 10 years ago. He has a 10.00 pack-year smoking history. He has quit using smokeless tobacco. He reports current alcohol use. He reports that he does not use drugs. .     Allergies: is allergic to dye [iodides]. .     Current Medications:  Outpatient Medications Marked as Taking for the 2/24/22 encounter (Office Visit) with Irma Longo MD   Medication Sig Dispense Refill    blood glucose monitor kit and supplies Dispense glucometer covered by patient's insurance . Test blood sugar daily DX:E11.9 1 kit 0    blood glucose monitor strips Test one time a day DX:E11.9 100 strip 3    Dulaglutide 0.75 MG/0.5ML SOPN Inject 0.75 mg into the skin once a week for 4 doses 4 pen 0    JARDIANCE 25 MG tablet TAKE 1 TABLET DAILY 90 tablet 1    rosuvastatin (CRESTOR) 10 MG tablet TAKE 1 TABLET NIGHTLY 90 tablet 1    Lancets MISC Use to test blood sugar daily DX:E11.9 100 each 3    metFORMIN (GLUCOPHAGE) 500 MG tablet TAKE 2 TABLETS BY MOUTH  TWICE DAILY WITH MEALS 360 tablet 1    VITAMIN D PO Take by mouth daily        PHYSICAL EXAMINATION     Vitals:    02/24/22 1433   BP: 130/70   Pulse: 62   Temp: 97.3 °F (36.3 °C)     Body mass index is 33.77 kg/m². Physical Exam  Vitals reviewed. Constitutional:       General: He is not in acute distress. Appearance: Normal appearance. He is obese. He is not ill-appearing.    HENT:      Head: Normocephalic and atraumatic. Right Ear: External ear normal.      Left Ear: External ear normal.      Nose: Nose normal.      Mouth/Throat:      Mouth: Mucous membranes are moist.      Pharynx: Oropharynx is clear. Eyes:      Extraocular Movements: Extraocular movements intact. Conjunctiva/sclera: Conjunctivae normal.   Cardiovascular:      Rate and Rhythm: Normal rate and regular rhythm. Pulses: Normal pulses. Heart sounds: Normal heart sounds. No murmur heard. Pulmonary:      Effort: Pulmonary effort is normal. No respiratory distress. Breath sounds: Normal breath sounds. No wheezing or rales. Abdominal:      General: Abdomen is flat. Bowel sounds are normal.      Palpations: Abdomen is soft. Tenderness: There is no guarding. Musculoskeletal:         General: Normal range of motion. Cervical back: Normal range of motion and neck supple. Right lower leg: No edema. Left lower leg: No edema. Skin:     General: Skin is warm. Capillary Refill: Capillary refill takes less than 2 seconds. Findings: No rash. Neurological:      General: No focal deficit present. Mental Status: He is alert and oriented to person, place, and time. Mental status is at baseline. Psychiatric:         Mood and Affect: Mood normal.         Behavior: Behavior normal.         Thought Content:  Thought content normal.         Judgment: Judgment normal.        MOST RECENT DIAGNOSTIC DATA     Recent Labs:  CBC with Differential:    Lab Results   Component Value Date    WBC 7.4 01/28/2022    RBC 5.74 01/28/2022    HGB 16.9 01/28/2022    HCT 52.2 01/28/2022     01/28/2022    MCV 90.9 01/28/2022    MCH 29.4 01/28/2022    MCHC 32.4 01/28/2022    RDW 13.7 03/02/2019    NRBC 0 01/28/2022    SEGSPCT 63.9 01/28/2022    LYMPHOPCT 25.0 03/02/2019    MONOPCT 10.0 01/28/2022    MONOPCT 9.0 03/02/2019    EOSPCT 3.9 03/02/2019    BASOPCT 0.5 03/02/2019    MONOSABS 0.7 01/28/2022    LYMPHSABS 1.6 01/28/2022    EOSABS 0.3 01/28/2022    BASOSABS 0.1 01/28/2022     BMP:    Lab Results   Component Value Date     01/28/2022    K 5.0 01/28/2022     01/28/2022    CO2 26 01/28/2022    BUN 23 01/28/2022    LABALBU 4.5 03/02/2019    CREATININE 0.9 01/28/2022    CALCIUM 9.5 01/28/2022    LABGLOM 84 01/28/2022    GLUCOSE 163 01/28/2022    GLUCOSE 124 08/17/2019     HgBA1c:    Lab Results   Component Value Date    LABA1C 7.4 02/24/2022    LABA1C 8.5 03/02/2019     Radiology:  No results found. Other Studies:  No results found for: LVEF, LVEFMODE    An electronic signature was used to authenticate this note. Please note that major portions of this note were dictated using dragon voice recognition software and electronically transcribed. This transcription may contain errors not detected in proofreading. Please do not hesitate to reach out to myself or our office for any clarification if needed. Chart reviewed with and plan created in collaboration with attending physician, Dr. Estrellita Forrester DO. All orders placed were reviewed by the attending physician prior to signing.     Electronically signed by Lillian Alford MD on 02/24/22  Internal Medicine PGY-2  [facility]  Brian Bartlett, [address]

## 2022-02-19 PROBLEM — I10 ESSENTIAL HYPERTENSION: Chronic | Status: ACTIVE | Noted: 2021-07-22

## 2022-02-19 PROBLEM — C61 PRIMARY PROSTATE CANCER (HCC): Status: RESOLVED | Noted: 2017-03-21 | Resolved: 2022-02-19

## 2022-02-19 PROBLEM — Z85.46 H/O PROSTATE CANCER: Status: RESOLVED | Noted: 2017-06-26 | Resolved: 2022-02-19

## 2022-02-19 PROBLEM — Z85.46 HISTORY OF ADENOCARCINOMA OF PROSTATE: Status: ACTIVE | Noted: 2017-03-21

## 2022-02-19 PROBLEM — E78.5 TYPE 2 DIABETES MELLITUS WITH HYPERLIPIDEMIA (HCC): Chronic | Status: ACTIVE | Noted: 2017-03-21

## 2022-02-19 PROBLEM — E11.69 TYPE 2 DIABETES MELLITUS WITH HYPERLIPIDEMIA (HCC): Chronic | Status: ACTIVE | Noted: 2017-03-21

## 2022-02-19 PROBLEM — E78.5 HYPERLIPIDEMIA: Status: RESOLVED | Noted: 2017-06-26 | Resolved: 2022-02-19

## 2022-02-19 PROBLEM — R03.0 TRANSIENT HYPERTENSION: Status: ACTIVE | Noted: 2022-02-19

## 2022-02-19 PROBLEM — I10 ESSENTIAL HYPERTENSION: Status: RESOLVED | Noted: 2021-07-22 | Resolved: 2022-02-19

## 2022-02-22 DIAGNOSIS — E78.00 PURE HYPERCHOLESTEROLEMIA: ICD-10-CM

## 2022-02-22 DIAGNOSIS — E11.9 TYPE 2 DIABETES MELLITUS WITHOUT COMPLICATION, WITHOUT LONG-TERM CURRENT USE OF INSULIN (HCC): ICD-10-CM

## 2022-02-22 RX ORDER — ROSUVASTATIN CALCIUM 10 MG/1
TABLET, COATED ORAL
Qty: 90 TABLET | Refills: 1 | Status: SHIPPED | OUTPATIENT
Start: 2022-02-22 | End: 2022-06-16 | Stop reason: SDUPTHER

## 2022-02-22 RX ORDER — EMPAGLIFLOZIN 25 MG/1
TABLET, FILM COATED ORAL
Qty: 90 TABLET | Refills: 1 | Status: SHIPPED | OUTPATIENT
Start: 2022-02-22 | End: 2022-06-16 | Stop reason: ALTCHOICE

## 2022-02-24 ENCOUNTER — OFFICE VISIT (OUTPATIENT)
Dept: INTERNAL MEDICINE CLINIC | Age: 67
End: 2022-02-24
Payer: COMMERCIAL

## 2022-02-24 VITALS
WEIGHT: 242 LBS | BODY MASS INDEX: 33.88 KG/M2 | HEART RATE: 62 BPM | DIASTOLIC BLOOD PRESSURE: 70 MMHG | SYSTOLIC BLOOD PRESSURE: 130 MMHG | TEMPERATURE: 97.3 F | HEIGHT: 71 IN

## 2022-02-24 DIAGNOSIS — E11.9 TYPE 2 DIABETES MELLITUS WITHOUT COMPLICATION, WITHOUT LONG-TERM CURRENT USE OF INSULIN (HCC): Primary | ICD-10-CM

## 2022-02-24 DIAGNOSIS — E78.5 TYPE 2 DIABETES MELLITUS WITH HYPERLIPIDEMIA (HCC): Chronic | ICD-10-CM

## 2022-02-24 DIAGNOSIS — E11.69 TYPE 2 DIABETES MELLITUS WITH HYPERLIPIDEMIA (HCC): Chronic | ICD-10-CM

## 2022-02-24 DIAGNOSIS — E66.9 OBESITY (BMI 30-39.9): ICD-10-CM

## 2022-02-24 LAB — HBA1C MFR BLD: 7.4 % (ref 4.3–5.7)

## 2022-02-24 PROCEDURE — 99213 OFFICE O/P EST LOW 20 MIN: CPT | Performed by: STUDENT IN AN ORGANIZED HEALTH CARE EDUCATION/TRAINING PROGRAM

## 2022-02-24 PROCEDURE — 83036 HEMOGLOBIN GLYCOSYLATED A1C: CPT | Performed by: STUDENT IN AN ORGANIZED HEALTH CARE EDUCATION/TRAINING PROGRAM

## 2022-02-24 PROCEDURE — 3051F HG A1C>EQUAL 7.0%<8.0%: CPT | Performed by: STUDENT IN AN ORGANIZED HEALTH CARE EDUCATION/TRAINING PROGRAM

## 2022-02-24 RX ORDER — GLUCOSAMINE HCL/CHONDROITIN SU 500-400 MG
CAPSULE ORAL
Qty: 100 STRIP | Refills: 3 | Status: SHIPPED | OUTPATIENT
Start: 2022-02-24 | End: 2022-10-27 | Stop reason: SDUPTHER

## 2022-02-24 SDOH — ECONOMIC STABILITY: FOOD INSECURITY: WITHIN THE PAST 12 MONTHS, THE FOOD YOU BOUGHT JUST DIDN'T LAST AND YOU DIDN'T HAVE MONEY TO GET MORE.: NEVER TRUE

## 2022-02-24 SDOH — ECONOMIC STABILITY: FOOD INSECURITY: WITHIN THE PAST 12 MONTHS, YOU WORRIED THAT YOUR FOOD WOULD RUN OUT BEFORE YOU GOT MONEY TO BUY MORE.: NEVER TRUE

## 2022-02-24 ASSESSMENT — SOCIAL DETERMINANTS OF HEALTH (SDOH): HOW HARD IS IT FOR YOU TO PAY FOR THE VERY BASICS LIKE FOOD, HOUSING, MEDICAL CARE, AND HEATING?: NOT HARD AT ALL

## 2022-02-25 ENCOUNTER — NURSE ONLY (OUTPATIENT)
Dept: LAB | Age: 67
End: 2022-02-25

## 2022-02-25 DIAGNOSIS — E11.9 TYPE 2 DIABETES MELLITUS WITHOUT COMPLICATION, WITHOUT LONG-TERM CURRENT USE OF INSULIN (HCC): ICD-10-CM

## 2022-02-25 LAB — C-PEPTIDE: 5.2 NG/ML (ref 1.1–4.4)

## 2022-03-15 NOTE — PATIENT INSTRUCTIONS
The medication list included in this document is our record of what you are currently taking, including any changes that were made at today's visit.  If you find any differences when compared to your medications at home, or have any questions that were not answered at your visit, please contact the office. Please note that our office is closed on weekends and major holidays. All non-emergent calls or questions can be messaged to us via [employer] if you are enrolled. Refills can be requested via [employer] as well. For non-emergent problems, you have the option of going to an [facility] near you when our office is closed. In case of an emergency, please dial 911 or go to your nearest emergency room for evaluation. Patient Education     dulaglutide  Pronunciation:  DOO la GLOO tide  Brand:  Trulicity Pen  What is the most important information I should know about dulaglutide? You should not use dulaglutide if you have Multiple Endocrine Neoplasia syndrome type 2 (MEN 2), or a personal or family history of medullary thyroid carcinoma (a type of thyroid cancer). Do not use dulaglutide if you are in a state of diabetic ketoacidosis (call your doctor for treatment). In animal studies, dulaglutide caused thyroid tumors or thyroid cancer. It is not known whether these effects would occur in people using regular doses. Ask your doctor about your risk. Call your doctor at once if you have signs of a thyroid tumor, such as swelling or a lump in your neck, trouble swallowing, a hoarse voice, or shortness of breath. What is dulaglutide? Dulaglutide is used together with diet and exercise to improve blood sugar control in adults with type 2 diabetes mellitus. Dulaglutide is also used to help reduce the risk of serious heart problems such as heart attack or stroke in adults who have type 2 diabetes and heart disease. This medicine is not for treating type 1 diabetes.    Dulaglutide may also be used for purposes not listed in this medication guide. What should I discuss with my healthcare provider before using dulaglutide? You should not use dulaglutide if you are allergic to it, or if you have:  · multiple endocrine neoplasia type 2 (tumors in your glands);  · a personal or family history of medullary thyroid carcinoma (a type of thyroid cancer); or  · diabetic ketoacidosis (call your doctor for treatment). Tell your doctor if you have ever had:  · pancreatitis;  · a stomach or intestinal disorder;  · gastroesophageal reflux disease (GERD) or slow digestion;  · eye problems caused by diabetes (retinopathy);  · liver or kidney disease;  · if you also use insulin or oral diabetes medicine; or  · if you have been sick with vomiting or diarrhea. In animal studies, dulaglutide caused thyroid tumors or thyroid cancer. It is not known whether these effects would occur in people using regular doses. Ask your doctor about your risk. It is not known whether this medicine will harm an unborn baby. Tell your doctor if you are pregnant or plan to become pregnant. It may not be safe to breast-feed while using this medicine. Ask your doctor about any risk. Dulaglutide is not approved for use by anyone younger than 25years old. How should I use dulaglutide? Follow all directions on your prescription label and read all medication guides or instruction sheets. Your doctor may occasionally change your dose. Use the medicine exactly as directed. Dulaglutide is injected under the skin once per week. Use dulaglutide on the same day each week at the same time of day. If you change your dosing day, allow at least 3 days to pass between doses. You may use dulaglutide with or without food. Read and carefully follow any Instructions for Use provided with your medicine. Ask your doctor or pharmacist if you do not understand these instructions. Your healthcare provider will show you where on your body to inject dulaglutide.  Use a different place each time you give an injection. Do not inject into the same place two times in a row. You may have low blood sugar (hypoglycemia) and feel very hungry, dizzy, irritable, confused, anxious, or shaky. To quickly treat hypoglycemia, eat or drink a fast-acting source of sugar (fruit juice, hard candy, crackers, raisins, or non-diet soda). Your doctor may prescribe a glucagon injection kit in case you have severe hypoglycemia. Be sure your family or close friends know how to give you this injection in an emergency. Also watch for signs of high blood sugar (hyperglycemia) such as increased thirst or urination. Blood sugar levels can be affected by stress, illness, surgery, exercise, alcohol use, or skipping meals. Ask your doctor before changing your dose or medication schedule. Each injection pen or prefilled syringe is for one use only. Throw away after one use, even if there is still medicine left inside. Use a puncture-proof \"sharps\" container. Follow state or local laws about how to dispose of this container. Keep it out of the reach of children and pets. Store dulaglutide in the refrigerator, protected from light. Do not use past the expiration date on the medicine label. Do not freeze dulaglutide, and throw away the medicine if it has become frozen. You may also store dulaglutide at room temperature for up to 14 days before use. What happens if I miss a dose? Use the medicine as soon as you can, but skip the missed dose if your next dose is due in less than 3 days. Do not use two doses at one time. Do not use this medicine twice within a 72-hour period. What happens if I overdose? Seek emergency medical attention or call the Poison Help line at 1-446.604.6528. What should I avoid while using dulaglutide? Never share an injection pen or prefilled syringe with another person, even if the needle has been changed.  Sharing these devices can allow infections or disease to pass from one person to another. What are the possible side effects of dulaglutide? Stop using dulaglutide and get emergency medical help if you have signs of an allergic reaction: hives; difficult breathing; feeling light-headed; swelling of your face, lips, tongue, or throat. Call your doctor at once if you have:  · pancreatitis --severe pain in your upper stomach spreading to your back, nausea and vomiting;  · signs of a thyroid tumor --swelling or a lump in your neck, trouble swallowing, a hoarse voice, or if you feel short of breath;  · low blood sugar --headache, hunger, weakness, sweating, confusion, irritability, dizziness, fast heart rate, or feeling jittery; or  · kidney problems --little or no urination, swelling in your feet or ankles, feeling tired or short of breath. Tell your doctor if you are sick with vomiting or diarrhea, or if you are sweating more than usual. You can easily become dehydrated while using dulaglutide. This can lead to kidney failure. Common side effects may include:  · nausea, vomiting, stomach pain;  · diarrhea; or  · loss of appetite. This is not a complete list of side effects and others may occur. Call your doctor for medical advice about side effects. You may report side effects to FDA at 9-446-FDA-4068. What other drugs will affect dulaglutide? Dulaglutide can slow your digestion, and it may take longer for your body to absorb any medicines you take by mouth. Other drugs may affect dulaglutide, including prescription and over-the-counter medicines, vitamins, and herbal products. Tell your doctor about all your current medicines and any medicine you start or stop using. Where can I get more information? Your pharmacist can provide more information about dulaglutide. Remember, keep this and all other medicines out of the reach of children, never share your medicines with others, and use this medication only for the indication prescribed.    Every effort has been made to ensure that the information provided by [address] is accurate, up-to-date, and complete, but no guarantee is made to that effect. Drug information contained herein may be time sensitive. [facility] information has been compiled for use by healthcare practitioners and consumers in the [place] and therefore [facility] does not warrant that uses outside of the [place] are appropriate, unless specifically indicated otherwise. [facility]'s drug information does not endorse drugs, diagnose patients or recommend therapy. [facility]'s drug information is an informational resource designed to assist licensed healthcare practitioners in caring for their patients and/or to serve consumers viewing this service as a supplement to, and not a substitute for, the expertise, skill, knowledge and judgment of healthcare practitioners. The absence of a warning for a given drug or drug combination in no way should be construed to indicate that the drug or drug combination is safe, effective or appropriate for any given patient. [facility] does not assume any responsibility for any aspect of healthcare administered with the aid of information [facility] provides. The information contained herein is not intended to cover all possible uses, directions, precautions, warnings, drug interactions, allergic reactions, or adverse effects. If you have questions about the drugs you are taking, check with your doctor, nurse or pharmacist.  Copyright 5933-4288 167 Riccardo Rehan: 4.02. Revision date: 10/8/2020. Care instructions adapted under license by [facility] ([facility]). If you have questions about a medical condition or this instruction, always ask your healthcare professional. [place] [address] any warranty or liability for your use of this information.

## 2022-03-24 ENCOUNTER — OFFICE VISIT (OUTPATIENT)
Dept: INTERNAL MEDICINE CLINIC | Age: 67
End: 2022-03-24
Payer: COMMERCIAL

## 2022-03-24 ENCOUNTER — NURSE ONLY (OUTPATIENT)
Dept: LAB | Age: 67
End: 2022-03-24

## 2022-03-24 VITALS
TEMPERATURE: 97.2 F | SYSTOLIC BLOOD PRESSURE: 130 MMHG | WEIGHT: 240.6 LBS | DIASTOLIC BLOOD PRESSURE: 80 MMHG | BODY MASS INDEX: 33.68 KG/M2 | HEART RATE: 76 BPM | HEIGHT: 71 IN

## 2022-03-24 DIAGNOSIS — B35.1 ONYCHOMYCOSIS: ICD-10-CM

## 2022-03-24 DIAGNOSIS — E11.69 TYPE 2 DIABETES MELLITUS WITH HYPERLIPIDEMIA (HCC): Primary | Chronic | ICD-10-CM

## 2022-03-24 DIAGNOSIS — E78.5 TYPE 2 DIABETES MELLITUS WITH HYPERLIPIDEMIA (HCC): Primary | Chronic | ICD-10-CM

## 2022-03-24 LAB
ALBUMIN SERPL-MCNC: 4.9 G/DL (ref 3.5–5.1)
ALP BLD-CCNC: 68 U/L (ref 38–126)
ALT SERPL-CCNC: 16 U/L (ref 11–66)
AST SERPL-CCNC: 21 U/L (ref 5–40)
BILIRUB SERPL-MCNC: 0.6 MG/DL (ref 0.3–1.2)
BILIRUBIN DIRECT: < 0.2 MG/DL (ref 0–0.3)
TOTAL PROTEIN: 7.6 G/DL (ref 6.1–8)

## 2022-03-24 PROCEDURE — 3051F HG A1C>EQUAL 7.0%<8.0%: CPT | Performed by: STUDENT IN AN ORGANIZED HEALTH CARE EDUCATION/TRAINING PROGRAM

## 2022-03-24 PROCEDURE — 99213 OFFICE O/P EST LOW 20 MIN: CPT | Performed by: STUDENT IN AN ORGANIZED HEALTH CARE EDUCATION/TRAINING PROGRAM

## 2022-03-24 RX ORDER — TERBINAFINE HYDROCHLORIDE 250 MG/1
250 TABLET ORAL DAILY
Qty: 14 TABLET | Refills: 0 | Status: SHIPPED | OUTPATIENT
Start: 2022-03-24 | End: 2022-04-07

## 2022-03-24 RX ORDER — THERMOMETER, ELECTRONIC,ORAL
EACH MISCELLANEOUS
Qty: 28 G | Refills: 0 | Status: SHIPPED | OUTPATIENT
Start: 2022-03-24 | End: 2022-09-29 | Stop reason: ALTCHOICE

## 2022-03-24 RX ORDER — DULAGLUTIDE 0.75 MG/.5ML
0.75 INJECTION, SOLUTION SUBCUTANEOUS WEEKLY
COMMUNITY
End: 2022-03-24 | Stop reason: DRUGHIGH

## 2022-03-28 ENCOUNTER — TELEPHONE (OUTPATIENT)
Dept: INTERNAL MEDICINE CLINIC | Age: 67
End: 2022-03-28

## 2022-03-28 NOTE — TELEPHONE ENCOUNTER
----- Message from Silvino Richard MD sent at 3/28/2022  9:15 AM EDT -----  Please notify patient that their lab results are normal.

## 2022-05-13 DIAGNOSIS — E78.5 TYPE 2 DIABETES MELLITUS WITH HYPERLIPIDEMIA (HCC): Chronic | ICD-10-CM

## 2022-05-13 DIAGNOSIS — E11.69 TYPE 2 DIABETES MELLITUS WITH HYPERLIPIDEMIA (HCC): Chronic | ICD-10-CM

## 2022-05-16 RX ORDER — DULAGLUTIDE 1.5 MG/.5ML
INJECTION, SOLUTION SUBCUTANEOUS
Qty: 4 PEN | Refills: 5 | Status: SHIPPED | OUTPATIENT
Start: 2022-05-16 | End: 2022-06-16 | Stop reason: DRUGHIGH

## 2022-05-24 NOTE — ASSESSMENT & PLAN NOTE
KENYETTA, 3rd digit. Occurred one month ago after accidentally smashing his finger with a hammer or other tool. Some bleeding at first which he was able to control. Since then the nail bed has not grown back. Suspect that due to underlying diabetic microvascular changes he will have a major delay in this type of healing. Evidence of minor fungal involvement within the nail bed. Will treat both orally and topically, see orders. Will also obtain a HFP now and then again once treatment with Lamisil is completed. Meds-to-Beds: Discharge prescription orders listed below delivered to patient in discharge lounge. RN notified. Patient and patient's SO counseled and elected to have co-payment billed to patient account (patient aware lidocaine patches and celecoxib not covered by insurance).       Current Outpatient Medications   Medication Sig Dispense Refill   • celecoxib (CELEBREX) 100 MG Cap Take 1 Capsule by mouth 2 times a day for 15 days. 30 Capsule 0   • gabapentin (NEURONTIN) 100 MG Cap Take 1 Capsule by mouth 3 times a day for 15 days. 45 Capsule 0   • lidocaine (LIDODERM) 5 % Patch Place 1 Patch on the skin every 24 hours for 15 days (12 hours on, 12 hours off) 15 Patch 0   • tizanidine (ZANAFLEX) 2 MG tablet Take 1 Tablet by mouth every 6 hours as needed (muscle spasm) for up to 10 days. 40 Tablet 0   • oxyCODONE immediate-release (ROXICODONE) 5 MG Tab Take 1 Tablet by mouth every four hours as needed for Severe Pain for up to 7 days. 30 Tablet 0      Suzanne Song, OscarD

## 2022-06-14 ENCOUNTER — NURSE ONLY (OUTPATIENT)
Dept: LAB | Age: 67
End: 2022-06-14

## 2022-06-14 DIAGNOSIS — B35.1 ONYCHOMYCOSIS: ICD-10-CM

## 2022-06-14 LAB
ALBUMIN SERPL-MCNC: 4.5 G/DL (ref 3.5–5.1)
ALP BLD-CCNC: 57 U/L (ref 38–126)
ALT SERPL-CCNC: 20 U/L (ref 11–66)
AST SERPL-CCNC: 25 U/L (ref 5–40)
BILIRUB SERPL-MCNC: 0.8 MG/DL (ref 0.3–1.2)
BILIRUBIN DIRECT: < 0.2 MG/DL (ref 0–0.3)
TOTAL PROTEIN: 6.8 G/DL (ref 6.1–8)

## 2022-06-14 NOTE — PROGRESS NOTES
[address] [facility] Internal Medicine   [facility] [address], [place]  Dept: [phone]  Dept Fax: [phone]    This is an established patient, here for evaluation of the following chief complaint(s):  Chief Complaint   Patient presents with    Diabetes    Other     Onychomycosis     ASSESSMENT AND PLAN     1. Type 2 diabetes mellitus with hyperlipidemia ([facility] [address]  Assessment & Plan:  Significant improvement since the addition of Trulicity. A1c today is 6.2%, decreased from 7.4% measured 3 months ago. Patient has been on Trulicity 1.5 mg weekly, Jardiance 25 mg qd, Glucophage 1000 mg BID. Plan is to increase Trulicity to 3 mg weekly, discontinue Jardiance and continue with current dosage of Glucophage. Lethia Riedel verbalized understanding and is in agreement of this plan. Most recent lipid panel completed on 1/28/2022. LDL 57, HDL 45. Will continue current management with Crestor 10 mg nightly. Most recent eye exam completed on 4/6/2022 with note of worsening cataract in the right eye. Diabetic foot exam completed on 3/24/2022 and was normal/without evidence of diabetic foot abnormalities. Patient is currently on statin therapy as above. Not on ASA per patient's choice. Not on ACE/ARB therapy due to worsening renal function when it was prescribed in the past.  BMP ordered and is due now. Orders:  -     POCT glycosylated hemoglobin (Hb A1C)  -     rosuvastatin (CRESTOR) 10 MG tablet; TAKE 1 TABLET NIGHTLY, Disp-90 tablet, R-0Normal  -     Dulaglutide 3 MG/0.5ML SOPN; Inject 3 mg into the skin once a week, Disp-12 pen, R-0Normal  -     Basic Metabolic Panel; Future    2. History of Stage III Prostate Adenocarcinoma  Assessment & Plan:  Prostate removed in May 2017. Pathologic staging pT3aN0. Patient did not require any chemotherapy. Last PSA was measured on 7/20/2021, was < 0.02/undetectable. Repeat PSA ordered, due after 7/20/2022.   Lethia Riedel does not have any gross evidence of malignant disease at this time. Orders:  -     PSA Prostatic Specific Antigen; Future    Disposition:  Return in 14 weeks (on 9/22/2022) for diabetes. ISABELLA Slade (1955) is a pleasant 79 y.o. male, former smoker, here for a follow up visit for problems stated above. I last saw this patient on 3/24/2022. I have personally reviewed and made updates as necessary to the patients medical/surgical/family/social history and current allergies in the chart. I have reviewed any new recent labs, ekg, echo and imaging that are in the EMR. Today  Milan Alcaraz is here for his 3-month follow-up on diabetes and Trulicity   States that he is doing very well. Has had excellent control of his blood glucose levels since starting Trulicity and subsequently increasing the dosage to 1.5 mg weekly. Has had no adverse side effects from Trulicity   Hemoglobin A1c today is 6.2%. Huge improvement from previous value of 7.4%. Discussed with Carol Hairston that our plan now is to discontinue his Jardiance and increase his Trulicity to 3 mg weekly. He will continue with his current dosage of Glucophage. Based on his next A1c, Glucophage can be discontinued. Discussed with Carol Hairston that the Glucophage does not have to be tapered off and can be discontinued altogether   Patient reported noticing a skin lesion that developed on his left temple 1 month ago. Has not changed in character/quality since initial discovery. See media section in EMR, picture of lesion has been pasted to this note, see below. Discussed with Carol Hairston that this is a benign lesion and does not require any further work-up at this time. He was instructed to notify our office if he notices any changes in size or quality of this lesion.   I showed [employer] of what melanoma looks like and provided education on differences between benign versus malignant skin lesions    Review of Systems - History obtained from the patient  General ROS: negative for - chills, fatigue, fever, night sweats or sleep disturbance  Psychological ROS: negative for - anxiety  Hematological and Lymphatic ROS: negative for - swollen lymph nodes  Respiratory ROS: no cough, shortness of breath, or wheezing  Cardiovascular ROS: no chest pain or dyspnea on exertion  Gastrointestinal ROS: no abdominal pain, change in bowel habits, or black or bloody stools  Genito-Urinary ROS: no dysuria, trouble voiding, or hematuria  Musculoskeletal ROS: negative for - joint pain, joint stiffness or joint swelling  Neurological ROS: no TIA or stroke symptoms  Dermatological ROS: positive for - newly discovered skin lesion on the left temple area  negative for - skin lesion changes    Current Medications:  Outpatient Medications Marked as Taking for the 6/16/22 encounter (Office Visit) with Lis Blair MD   Medication Sig Dispense Refill    rosuvastatin (CRESTOR) 10 MG tablet TAKE 1 TABLET NIGHTLY 90 tablet 0    Dulaglutide 3 MG/0.5ML SOPN Inject 3 mg into the skin once a week 12 pen 0    tolnaftate (TINACTIN) 1 % cream Apply topically 2 times daily for 28 days 28 g 0    blood glucose monitor kit and supplies Dispense glucometer covered by patient's insurance . Test blood sugar daily DX:E11.9 1 kit 0    blood glucose monitor strips Test one time a day DX:E11.9 100 strip 3    Lancets MISC Use to test blood sugar daily DX:E11.9 100 each 3    metFORMIN (GLUCOPHAGE) 500 MG tablet TAKE 2 TABLETS BY MOUTH  TWICE DAILY WITH MEALS 360 tablet 1    VITAMIN D PO Take by mouth daily       ACTIVE PROBLEMS AND HISTORY     Active Problems with Overview  Patient Active Problem List    Diagnosis Date Noted    Transient hypertension 02/19/2022     Well controlled on no antihypertensives      Obesity (BMI 30-39.9) 12/20/2021     As reported being more sedentary due to the winter season. States he will be back to his normal active self once the weather gets better/warmer.       Type 2 diabetes mellitus with hyperlipidemia ([facility] [address] 03/21/2017     # Non-Insulin Dependent Diabetes Mellitus Type 2    Hemoglobin A1C (%)   Date Value   02/24/2022 7.4 (H)   11/04/2021 7.0 (H)   03/02/2019 8.5 (H)   08/15/2018 7.3 (H)      Most recent eye exam on 4/6/2022, with Dr. Sagar Georgecil. O.D. who reported no evidence of DM retinopathy. There was note of worsening of the cataract in the right eye. Patient reminded to have eye exam done yearly. Renal function is stable compared to baseline. Most recent pertinent labs with previous values to compare:  Est, Glom Filt Rate (ml/min/1.73m2)   Date Value   01/28/2022 84 (A)   08/30/2021 84 (A)   08/13/2021 67 (A)      CREATININE (mg/dL)   Date Value   01/28/2022 0.9   08/30/2021 0.9   08/13/2021 1.1     Microalbumin Creatinine Ratio (mg/Gm)   Date Value   08/17/2019 9.3   08/15/2018 3.6      LDL Calculated (mg/dL)   Date Value   01/28/2022 57   01/11/2021 48      Interpretation of labs:   GFR stable  Creatinine stable  LDL stable    Patient denies foot lesions. Last foot exam done on 3/24/2022 and was normal.  Denies neuropathy. Denies any symptoms of autonomic dysfunction. Patient started on Trulicity 1/10/1957. Currently his Trulicity is dosed at 3 mg weekly. A1c is vastly improved to 6.2%. Jardiance discontinued 6/16/2022. Still on Glucophage. Patient is on a statin. Patient is not on ASA as per patients choice. Patient was unable to tolerate ACE/ARB in the past due to worsening renal function.  History of Stage III Prostate Adenocarcinoma 03/21/2017     pT3a, N0, M0. Prostate removed May 2017. PSAs remain undetectable. PMH:  has a past medical history of Benign essential tremor, History of Stage III Prostate Adenocarcinoma, Transient hypertension, Type 2 diabetes mellitus with hyperlipidemia ([facility] [address] and Umbilical hernia without obstruction and without gangrene.     PSH:  has a past surgical history that includes Biceps tendon repair (Right, 2015); hernia repair (05/02/2017); Prostatectomy (05/2017); eye surgery (07/31/2018); and Total knee arthroplasty (Bilateral, 2011). FH: family history includes Alzheimer's Disease in his mother; Cancer in his father; Other in his mother. SH:  reports that he quit smoking about 11 years ago. He has a 10.00 pack-year smoking history. He has quit using smokeless tobacco. He reports current alcohol use. He reports that he does not use drugs. Allergies: is allergic to dye [iodides]. PHYSICAL EXAMINATION     Vitals:    06/16/22 1353   BP: 110/80   Pulse: 80   Temp: 98.2 °F (36.8 °C)     Body mass index is 32.23 kg/m². Physical Exam  Vitals reviewed. Constitutional:       General: He is not in acute distress. Appearance: Normal appearance. He is not ill-appearing. HENT:      Head: Normocephalic and atraumatic. Right Ear: External ear normal.      Left Ear: External ear normal.      Nose: Nose normal. No congestion or rhinorrhea. Mouth/Throat:      Mouth: Mucous membranes are moist.      Pharynx: Oropharynx is clear. Eyes:      Extraocular Movements: Extraocular movements intact. Conjunctiva/sclera: Conjunctivae normal.   Cardiovascular:      Rate and Rhythm: Normal rate and regular rhythm. Pulses: Normal pulses. Heart sounds: Normal heart sounds. No murmur heard. Pulmonary:      Effort: Pulmonary effort is normal. No respiratory distress. Breath sounds: Normal breath sounds. No wheezing, rhonchi or rales. Abdominal:      General: Abdomen is flat. Bowel sounds are normal. There is no distension. Palpations: Abdomen is soft. Tenderness: There is no abdominal tenderness. There is no guarding. Musculoskeletal:         General: Normal range of motion. Cervical back: Normal range of motion and neck supple. Right lower leg: No edema. Left lower leg: No edema. Skin:     General: Skin is warm and dry.       Capillary Refill: Capillary refill takes less than 2 seconds. Findings: Lesion (see photo below) present. Neurological:      General: No focal deficit present. Mental Status: He is alert and oriented to person, place, and time. Mental status is at baseline. Psychiatric:         Mood and Affect: Mood normal.         Behavior: Behavior normal.         Thought Content: Thought content normal.         Judgment: Judgment normal.       Images:    Skin, left temple      MOST RECENT DIAGNOSTIC DATA     New Labs Since Last Visit:  3/24/2022  HFP: Albumin 4.9, total bilirubin 0.6, alk phos 68, AST 21, ALT 16, total protein 7.6    Radiology last 30 days:  No results found.     Other Studies:  No results found for: LVEF, LVEFMODE    Case and plan discussed with Dr. Jorge Hoyos DO    Electronically signed by Betty Mejia MD on 06/16/22  Internal Medicine Resident PGY-2  [facility] [address], [address]

## 2022-06-16 ENCOUNTER — OFFICE VISIT (OUTPATIENT)
Dept: INTERNAL MEDICINE CLINIC | Age: 67
End: 2022-06-16
Payer: COMMERCIAL

## 2022-06-16 VITALS
WEIGHT: 231 LBS | TEMPERATURE: 98.2 F | BODY MASS INDEX: 32.23 KG/M2 | DIASTOLIC BLOOD PRESSURE: 80 MMHG | HEART RATE: 80 BPM | SYSTOLIC BLOOD PRESSURE: 110 MMHG

## 2022-06-16 DIAGNOSIS — E11.69 TYPE 2 DIABETES MELLITUS WITH HYPERLIPIDEMIA (HCC): Primary | ICD-10-CM

## 2022-06-16 DIAGNOSIS — Z85.46 HISTORY OF ADENOCARCINOMA OF PROSTATE: ICD-10-CM

## 2022-06-16 DIAGNOSIS — E78.5 TYPE 2 DIABETES MELLITUS WITH HYPERLIPIDEMIA (HCC): Primary | ICD-10-CM

## 2022-06-16 PROBLEM — Z12.5 PROSTATE CANCER SCREENING: Status: RESOLVED | Noted: 2022-06-16 | Resolved: 2022-06-16

## 2022-06-16 PROBLEM — B35.1 ONYCHOMYCOSIS: Status: RESOLVED | Noted: 2022-03-24 | Resolved: 2022-06-16

## 2022-06-16 PROBLEM — Z12.5 PROSTATE CANCER SCREENING: Status: ACTIVE | Noted: 2022-06-16

## 2022-06-16 LAB — HBA1C MFR BLD: 6.2 % (ref 4.3–5.7)

## 2022-06-16 PROCEDURE — 3044F HG A1C LEVEL LT 7.0%: CPT | Performed by: STUDENT IN AN ORGANIZED HEALTH CARE EDUCATION/TRAINING PROGRAM

## 2022-06-16 PROCEDURE — 83036 HEMOGLOBIN GLYCOSYLATED A1C: CPT | Performed by: STUDENT IN AN ORGANIZED HEALTH CARE EDUCATION/TRAINING PROGRAM

## 2022-06-16 PROCEDURE — 1123F ACP DISCUSS/DSCN MKR DOCD: CPT | Performed by: STUDENT IN AN ORGANIZED HEALTH CARE EDUCATION/TRAINING PROGRAM

## 2022-06-16 PROCEDURE — 99213 OFFICE O/P EST LOW 20 MIN: CPT | Performed by: STUDENT IN AN ORGANIZED HEALTH CARE EDUCATION/TRAINING PROGRAM

## 2022-06-16 RX ORDER — ROSUVASTATIN CALCIUM 10 MG/1
TABLET, COATED ORAL
Qty: 90 TABLET | Refills: 0 | Status: SHIPPED | OUTPATIENT
Start: 2022-06-16

## 2022-06-16 ASSESSMENT — PATIENT HEALTH QUESTIONNAIRE - PHQ9
SUM OF ALL RESPONSES TO PHQ QUESTIONS 1-9: 0
SUM OF ALL RESPONSES TO PHQ9 QUESTIONS 1 & 2: 0
SUM OF ALL RESPONSES TO PHQ QUESTIONS 1-9: 0
2. FEELING DOWN, DEPRESSED OR HOPELESS: 0
1. LITTLE INTEREST OR PLEASURE IN DOING THINGS: 0

## 2022-06-16 NOTE — ASSESSMENT & PLAN NOTE
Significant improvement since the addition of Trulicity. A1c today is 6.2%, decreased from 7.4% measured 3 months ago. Patient has been on Trulicity 1.5 mg weekly, Jardiance 25 mg once daily, Glucophage 1000 mg BID. Plan is to increase Trulicity to 3 mg weekly, discontinue Jardiance and continue with current dosage of Glucophage. Jessica Carlson verbalized understanding and is in agreement of this plan. Most recent lipid panel completed on 1/28/2022. LDL 57, HDL 45. Will continue current management with Crestor 10 mg nightly. Most recent eye exam completed on 4/6/2022 with note of worsening cataract in the right eye. Diabetic foot exam completed on 3/24/2022 and was normal/without evidence of diabetic foot abnormalities. Patient is currently on statin therapy as above. Not on ASA per patient's choice. Not on ACE/ARB therapy due to worsening renal function when it was prescribed in the past.  BMP ordered and is due now.

## 2022-06-16 NOTE — PROGRESS NOTES
(TINACTIN) 1 % cream Apply topically 2 times daily for 28 days 28 g 0    blood glucose monitor kit and supplies Dispense glucometer covered by patient's insurance . Test blood sugar daily DX:E11.9 1 kit 0    blood glucose monitor strips Test one time a day DX:E11.9 100 strip 3    Lancets MISC Use to test blood sugar daily DX:E11.9 100 each 3    metFORMIN (GLUCOPHAGE) 500 MG tablet TAKE 2 TABLETS BY MOUTH  TWICE DAILY WITH MEALS 360 tablet 1    VITAMIN D PO Take by mouth daily       No current facility-administered medications for this visit. PHYSICAL EXAMINATION:    Performed by the medical resident            PROBLEM LIST AND PLANS:     Diagnosis Orders   1. Type 2 diabetes mellitus without complication, without long-term current use of insulin (HCC)  Dulaglutide 3 MG/0.5ML SOPN    Basic Metabolic Panel   2. Type 2 diabetes mellitus with hyperlipidemia (HCC)  POCT glycosylated hemoglobin (Hb A1C)   3. Pure hypercholesterolemia  rosuvastatin (CRESTOR) 10 MG tablet   4. Prostate cancer screening  PSA Prostatic Specific Antigen            I have examined the patient and the chart and appropriate lab and   radiology data and have discussed the case with Dr. Cachorro Hector and agree with his assessment and all plans. Oscar Amezquita

## 2022-06-16 NOTE — PATIENT INSTRUCTIONS
· Stop taking Jardiance, your A1c is now 6.2%.  I am increasing your trulicity dosage instead  · Get labs after July 20th 2022

## 2022-06-16 NOTE — ASSESSMENT & PLAN NOTE
Prostate removed in May 2017. Pathologic staging [id number]. Patient did not require any chemotherapy. Last PSA was measured on 7/20/2021, was < 0.02/undetectable. Repeat PSA ordered, due after 7/20/2022. Darrick Briggs does not have any gross evidence of malignant disease at this time.

## 2022-08-21 DIAGNOSIS — E11.9 TYPE 2 DIABETES MELLITUS WITHOUT COMPLICATION, WITHOUT LONG-TERM CURRENT USE OF INSULIN (HCC): ICD-10-CM

## 2022-08-22 RX ORDER — EMPAGLIFLOZIN 25 MG/1
TABLET, FILM COATED ORAL
Qty: 90 TABLET | Refills: 1 | OUTPATIENT
Start: 2022-08-22

## 2022-09-06 DIAGNOSIS — E11.69 TYPE 2 DIABETES MELLITUS WITH HYPERLIPIDEMIA (HCC): ICD-10-CM

## 2022-09-06 DIAGNOSIS — E78.5 TYPE 2 DIABETES MELLITUS WITH HYPERLIPIDEMIA (HCC): ICD-10-CM

## 2022-09-22 ENCOUNTER — NURSE ONLY (OUTPATIENT)
Dept: LAB | Age: 67
End: 2022-09-22

## 2022-09-22 DIAGNOSIS — Z85.46 HISTORY OF ADENOCARCINOMA OF PROSTATE: ICD-10-CM

## 2022-09-22 DIAGNOSIS — E78.5 TYPE 2 DIABETES MELLITUS WITH HYPERLIPIDEMIA (HCC): ICD-10-CM

## 2022-09-22 DIAGNOSIS — E11.69 TYPE 2 DIABETES MELLITUS WITH HYPERLIPIDEMIA (HCC): ICD-10-CM

## 2022-09-22 LAB
ANION GAP SERPL CALCULATED.3IONS-SCNC: 9 MEQ/L (ref 8–16)
BUN BLDV-MCNC: 17 MG/DL (ref 7–22)
CALCIUM SERPL-MCNC: 9.4 MG/DL (ref 8.5–10.5)
CHLORIDE BLD-SCNC: 105 MEQ/L (ref 98–111)
CO2: 26 MEQ/L (ref 23–33)
CREAT SERPL-MCNC: 0.8 MG/DL (ref 0.4–1.2)
GFR SERPL CREATININE-BSD FRML MDRD: > 90 ML/MIN/1.73M2
GLUCOSE BLD-MCNC: 120 MG/DL (ref 70–108)
POTASSIUM SERPL-SCNC: 4.4 MEQ/L (ref 3.5–5.2)
PROSTATE SPECIFIC ANTIGEN: < 0.02 NG/ML (ref 0–1)
SODIUM BLD-SCNC: 140 MEQ/L (ref 135–145)

## 2022-09-27 SDOH — HEALTH STABILITY: PHYSICAL HEALTH: ON AVERAGE, HOW MANY MINUTES DO YOU ENGAGE IN EXERCISE AT THIS LEVEL?: 120 MIN

## 2022-09-27 SDOH — HEALTH STABILITY: PHYSICAL HEALTH: ON AVERAGE, HOW MANY DAYS PER WEEK DO YOU ENGAGE IN MODERATE TO STRENUOUS EXERCISE (LIKE A BRISK WALK)?: 7 DAYS

## 2022-09-27 ASSESSMENT — LIFESTYLE VARIABLES
HOW OFTEN DO YOU HAVE SIX OR MORE DRINKS ON ONE OCCASION: 1
HOW MANY STANDARD DRINKS CONTAINING ALCOHOL DO YOU HAVE ON A TYPICAL DAY: 1 OR 2
HOW OFTEN DO YOU HAVE A DRINK CONTAINING ALCOHOL: 2-4 TIMES A MONTH
HOW MANY STANDARD DRINKS CONTAINING ALCOHOL DO YOU HAVE ON A TYPICAL DAY: 1
HOW OFTEN DO YOU HAVE A DRINK CONTAINING ALCOHOL: 3

## 2022-09-27 ASSESSMENT — PATIENT HEALTH QUESTIONNAIRE - PHQ9
2. FEELING DOWN, DEPRESSED OR HOPELESS: 0
SUM OF ALL RESPONSES TO PHQ QUESTIONS 1-9: 0
SUM OF ALL RESPONSES TO PHQ QUESTIONS 1-9: 0
1. LITTLE INTEREST OR PLEASURE IN DOING THINGS: 0
SUM OF ALL RESPONSES TO PHQ9 QUESTIONS 1 & 2: 0
SUM OF ALL RESPONSES TO PHQ QUESTIONS 1-9: 0
SUM OF ALL RESPONSES TO PHQ QUESTIONS 1-9: 0

## 2022-09-29 ENCOUNTER — OFFICE VISIT (OUTPATIENT)
Dept: INTERNAL MEDICINE CLINIC | Age: 67
End: 2022-09-29

## 2022-09-29 VITALS
TEMPERATURE: 97.8 F | BODY MASS INDEX: 33.26 KG/M2 | SYSTOLIC BLOOD PRESSURE: 118 MMHG | DIASTOLIC BLOOD PRESSURE: 80 MMHG | WEIGHT: 237.6 LBS | HEIGHT: 71 IN | HEART RATE: 72 BPM

## 2022-09-29 DIAGNOSIS — Z00.00 MEDICARE ANNUAL WELLNESS VISIT, SUBSEQUENT: Primary | ICD-10-CM

## 2022-09-29 DIAGNOSIS — Z71.89 COUNSELING REGARDING END OF LIFE DECISION MAKING: ICD-10-CM

## 2022-09-29 DIAGNOSIS — E11.9 TYPE 2 DIABETES MELLITUS WITHOUT COMPLICATION, WITHOUT LONG-TERM CURRENT USE OF INSULIN (HCC): ICD-10-CM

## 2022-09-29 DIAGNOSIS — Z23 FLU VACCINE NEED: ICD-10-CM

## 2022-09-29 DIAGNOSIS — Z23 NEED FOR PROPHYLACTIC VACCINATION AGAINST STREPTOCOCCUS PNEUMONIAE (PNEUMOCOCCUS): ICD-10-CM

## 2022-09-29 RX ORDER — ASPIRIN 81 MG/1
81 TABLET ORAL DAILY
COMMUNITY

## 2022-09-29 NOTE — PATIENT INSTRUCTIONS
- Got Pneuvax 23 and Flu vaccine today  - Planned on getting COVID-19 booster and Tdap vaccine in a few weeks  - Refused AAA screening today     Body Mass Index: Care Instructions  Your Care Instructions     Body mass index (BMI) can help you see if your weight is raising your risk for health problems. It uses a formula to compare how much you weigh with how tall you are. A BMI lower than 18.5 is considered underweight. A BMI between 18.5 and 24.9 is considered healthy. A BMI between 25 and 29.9 is considered overweight. A BMI of 30 or higher is considered obese. If your BMI is in the normal range, it means that you have a lower risk for weight-related health problems. If your BMI is in the overweight or obese range, you may be at increased risk for weight-related health problems, such as high blood pressure, heart disease, stroke, arthritis or joint pain, and diabetes. If your BMI is in the underweight range, you may be at increased risk for health problems such as fatigue, lower protection (immunity) against illness, muscle loss, bone loss, hair loss, and hormone problems. BMI is just one measure of your risk for weight-related health problems. You may be at higher risk for health problems if you are not active, you eat an unhealthy diet, or you drink too much alcohol or use tobacco products. Follow-up care is a key part of your treatment and safety. Be sure to make and go to all appointments, and call your doctor if you are having problems. It's also a good idea to know your test results and keep a list of the medicines you take. How can you care for yourself at home? Practice healthy eating habits. This includes eating plenty of fruits, vegetables, whole grains, lean protein, and low-fat dairy. If your doctor recommends it, get more exercise. Walking is a good choice. Bit by bit, increase the amount you walk every day. Try for at least 30 minutes on most days of the week. Do not smoke.  Smoking can increase your risk for health problems. If you need help quitting, talk to your doctor about stop-smoking programs and medicines. These can increase your chances of quitting for good. Limit alcohol to 2 drinks a day for men and 1 drink a day for women. Too much alcohol can cause health problems. If you have a BMI higher than 25  Your doctor may do other tests to check your risk for weight-related health problems. This may include measuring the distance around your waist. A waist measurement of more than 40 inches in men or 35 inches in women can increase the risk of weight-related health problems. Talk with your doctor about steps you can take to stay healthy or improve your health. You may need to make lifestyle changes to lose weight and stay healthy, such as changing your diet and getting regular exercise. If you have a BMI lower than 18.5  Your doctor may do other tests to check your risk for health problems. Talk with your doctor about steps you can take to stay healthy or improve your health. You may need to make lifestyle changes to gain or maintain weight and stay healthy, such as getting more healthy foods in your diet and doing exercises to build muscle. Where can you learn more? Go to https://Evolve Partnersjohn.healthHexago. org and sign in to your [employer] account. Enter S176 in the [employer] box to learn more about \"Body Mass Index: Care Instructions. \"     If you do not have an account, please click on the \"Sign Up Now\" link. Current as of: December 27, 2021               Content Version: 13.4  © 2006-2022 Healthwise, Incorporated. Care instructions adapted under license by [facility] [address] If you have questions about a medical condition or this instruction, always ask your healthcare professional. [place] [address] any warranty or liability for your use of this information. Learning About Healthy Weight  What is a healthy weight?      A healthy weight is the weight at which you feel good about yourself and have energy for work and play. It's also one that lowers your risk for health problems. What can you do to stay at a healthy weight? It can be hard to stay at a healthy weight, especially when fast food, vending-machine snacks, and processed foods are so easy to find. And with your busy lifestyle, activity may be low on your list of things to do. But staying at a healthy weight may be easier than you think. Here are some dos and don'ts for staying at a healthy weight. Do eat healthy foods  The kinds of foods you eat have a big impact on both your weight and your health. Reaching and staying at a healthy weight is not about going on a diet. It's about making healthier food choices every day and changing your diet for good. Healthy eating means eating a variety of foods so that you get all the nutrients you need. Your body needs protein, carbohydrate, and fats for energy. They keep your heart beating, your brain active, and your muscles working. On most days, try to eat from each food group. This means eating a variety of: Whole grains, such as whole wheat breads and pastas. Fruits and vegetables. Dairy products, such as low-fat milk, yogurt, and cheese. Lean proteins, such as all types of fish, chicken without the skin, and beans. Don't have too much or too little of one thing. All foods, if eaten in moderation, can be part of healthy eating. Even sweets can be okay. If your favorite foods are high in fat, salt, sugar, or calories, limit how often you eat them. Eat smaller servings, or look for healthy substitutes. Do watch what you eat  Many people eat more than their bodies need. Part of staying at a healthy weight means learning how much food you really need from day to day and not eating more than that. Even with healthy foods, eating too much can make you gain weight.   Having a well-balanced diet means that you eat enough, but not too much, and that your food gives you the nutrients you need to stay healthy. So listen to your body. Eat when you're hungry. Stop when you feel satisfied. It's a good idea to have healthy snacks ready for when you get hungry. Keep healthy snacks with you at work, in your car, and at home. If you have a healthy snack easily available, you'll be less likely to pick a candy bar or bag of chips from a vending machine instead. Some healthy snacks you might want to keep on hand are fruit, low-fat yogurt, string cheese, low-fat microwave popcorn, raisins and other dried fruit, nuts, whole wheat crackers, pretzels, carrots, celery sticks, and broccoli. Do some physical activity  A big part of reaching and staying at a healthy weight is being active. When you're active, you burn calories. This makes it easier to reach and stay at a healthy weight. When you're active on a regular basis, your body burns more calories, even when you're at rest. Being active helps you lose fat and build lean muscle. Try to be active for at least 1 hour every day. This may sound like a lot, but it's okay to be active in smaller blocks of time that add up to 1 hour a day. Any activity that makes your heart beat faster and keeps it there for a while counts. A brisk walk, run, or swim will get your heart beating faster. So will climbing stairs, shooting baskets, or cycling. Even some household chores like vacuuming and mowing the lawn will get your heart rate up. Pick activities that you enjoy--ones that make your heart beat faster, your muscles stronger, and your muscles and joints more flexible. If you find more than one thing you like doing, do them all. You don't have to do the same thing every day. Don't diet  Diets don't work. Diets are temporary. Because you give up so much when you diet, you may be hungry and think about food all the time. And after you stop dieting, you also may overeat to make up for what you missed.  Most people who diet end up gaining back the pounds they lost--and more. Remember that healthy bodies come in lots of shapes and sizes. Everyone can get healthier by eating better and being more active. Where can you learn more? Go to https://P[employer]john.JumpTime. org and sign in to your [employer] account. Enter 983 [id number] in the [employer] box to learn more about \"Learning About Healthy Weight. \"     If you do not have an account, please click on the \"Sign Up Now\" link. Current as of: December 27, 2021               Content Version: 13.4  © 4737-7472 [employer]. Care instructions adapted under license by [facility] ([facility]). If you have questions about a medical condition or this instruction, always ask your healthcare professional. [address] any warranty or liability for your use of this information. Eating Healthy Foods: Care Instructions  Your Care Instructions     Eating healthy foods can help lower your risk for disease. Healthy food gives you energy and keeps your heart strong, your brain active, your muscles working, and your bones strong. A healthy diet includes a variety of foods from the basic food groups: grains, vegetables, fruits, milk and milk products, and meat and beans. Some people may eat more of their favorite foods from only one food group and, as a result, miss getting the nutrients they need. So, it is important to pay attention not only to what you eat but also to what you are missing from your diet. You can eat a healthy, balanced diet by making a few small changes. Follow-up care is a key part of your treatment and safety. Be sure to make and go to all appointments, and call your doctor if you are having problems. It's also a good idea to know your test results and keep a list of the medicines you take. How can you care for yourself at home? Look at what you eat  Keep a food diary for a week or two and record everything you eat or drink.  Track the number of servings you eat from each food group. For a balanced diet every day, eat a variety of:  6 or more ounce-equivalents of grains, such as cereals, breads, crackers, rice, or pasta, every day. An ounce-equivalent is 1 slice of bread, 1 cup of ready-to-eat cereal, or ½ cup of cooked rice, cooked pasta, or cooked cereal.  2½ cups of vegetables, especially:  Dark-green vegetables such as broccoli and spinach. Orange vegetables such as carrots and sweet potatoes. Dry beans (such as ruiz and kidney beans) and peas (such as lentils). 2 cups of fresh, frozen, or canned fruit. A small apple or 1 banana or orange equals 1 cup.  3 cups of nonfat or low-fat milk, yogurt, or other milk products. 5½ ounces of meat and beans, such as chicken, fish, lean meat, beans, nuts, and seeds. One egg, 1 tablespoon of peanut butter, ½ ounce nuts or seeds, or ¼ cup of cooked beans equals 1 ounce of meat. Learn how to read food labels for serving sizes and ingredients. Fast-food and convenience-food meals often contain few or no fruits or vegetables. Make sure you eat some fruits and vegetables to make the meal more nutritious. Look at your food diary. For each food group, add up what you have eaten and then divide the total by the number of days. This will give you an idea of how much you are eating from each food group. See if you can find some ways to change your diet to make it more healthy. Start small  Do not try to make dramatic changes to your diet all at once. You might feel that you are missing out on your favorite foods and then be more likely to fail. Start slowly, and gradually change your habits. Try some of the following:  Use whole wheat bread instead of white bread. Use nonfat or low-fat milk instead of whole milk. Eat brown rice instead of white rice, and eat whole wheat pasta instead of white-flour pasta. Try low-fat cheeses and low-fat yogurt.   Add more fruits and vegetables to meals and have them for snacks. Add lettuce, tomato, cucumber, and onion to sandwiches. Add fruit to yogurt and cereal.  Enjoy food  You can still eat your favorite foods. You just may need to eat less of them. If your favorite foods are high in fat, salt, and sugar, limit how often you eat them, but do not cut them out entirely. Eat a wide variety of foods. Make healthy choices when eating out  The type of restaurant you choose can help you make healthy choices. Even fast-food chains are now offering more low-fat or healthier choices on the menu. Choose smaller portions, or take half of your meal home. When eating out, try:  A veggie pizza with a whole wheat crust or grilled chicken (instead of sausage or pepperoni). Pasta with roasted vegetables, grilled chicken, or marinara sauce instead of cream sauce. A vegetable wrap or grilled chicken wrap. Broiled or poached food instead of fried or breaded items. Make healthy choices easy  Buy packaged, prewashed, ready-to-eat fresh vegetables and fruits, such as baby carrots, salad mixes, and chopped or shredded broccoli and cauliflower. Buy packaged, presliced fruits, such as melon or pineapple. Choose 100% fruit or vegetable juice instead of soda. Limit juice intake to 4 to 6 oz (½ to ¾ cup) a day. Blend low-fat yogurt, fruit juice, and canned or frozen fruit to make a smoothie for breakfast or a snack. Where can you learn more? Go to https://Urban Matrixjohn.Parature. org and sign in to your [employer] account. Enter U069 in the [employer] box to learn more about \"Eating Healthy Foods: Care Instructions. \"     If you do not have an account, please click on the \"Sign Up Now\" link. Current as of: May 9, 2022               Content Version: 13.4  © 2901-1542 Healthwise, Incorporated. Care instructions adapted under license by [facility].  If you have questions about a medical condition or this instruction, always ask your healthcare professional. [employer] disclaims any warranty or liability for your use of this information. Personalized Preventive Plan for Shannon Elder - 9/29/2022  Medicare offers a range of preventive health benefits. Some of the tests and screenings are paid in full while other may be subject to a deductible, co-insurance, and/or copay. Some of these benefits include a comprehensive review of your medical history including lifestyle, illnesses that may run in your family, and various assessments and screenings as appropriate. After reviewing your medical record and screening and assessments performed today your provider may have ordered immunizations, labs, imaging, and/or referrals for you. A list of these orders (if applicable) as well as your Preventive Care list are included within your After Visit Summary for your review. Other Preventive Recommendations:    A preventive eye exam performed by an eye specialist is recommended every 1-2 years to screen for glaucoma; cataracts, macular degeneration, and other eye disorders. A preventive dental visit is recommended every 6 months. Try to get at least 150 minutes of exercise per week or 10,000 steps per day on a pedometer . Order or download the FREE \"Exercise & Physical Activity: Your Everyday Guide\" from [employer]. Call 5-753.606.5838 or search [employer] online. You need 4505-4777 mg of calcium and 7376-5490 IU of vitamin D per day. It is possible to meet your calcium requirement with diet alone, but a vitamin D supplement is usually necessary to meet this goal.  When exposed to the sun, use a sunscreen that protects against both UVA and UVB radiation with an SPF of 30 or greater. Reapply every 2 to 3 hours or after sweating, drying off with a towel, or swimming. Always wear a seat belt when traveling in a car. Always wear a helmet when riding a bicycle or motorcycle.

## 2022-09-29 NOTE — PROGRESS NOTES
Immunizations Administered       Name Date Dose Route    Influenza, FLUAD, (age 72 y+), Adjuvanted, 0.5mL 9/29/2022 0.5 mL Intramuscular    Site: Deltoid- Left    Lot: [id number]    NDC: 61178-853-40    Pneumococcal Polysaccharide (Unmvepyzo63) 9/29/2022 0.5 mL Intramuscular    Site: Deltoid- Right    Lot: [id number]    NDC: 5955-1024-81              For Pharmacy [address] [place] [address] in place:  No  Recommendation Provided To: Patient/Caregiver: 1 via In person  Intervention Detail: Vaccine Recommended/Administered  Gap Closed?: No   Intervention Accepted By: Patient/Caregiver: 1  Time Spent (min): 5076 Nidia Sweeney, PharmD, [facility]  Internal Medicine Clinical Pharmacist  [phone]

## 2022-09-29 NOTE — PROGRESS NOTES
Medicare Annual Wellness Visit    [facility] is here for Medicare AWV    Assessment & Plan   Medicare annual wellness visit, subsequent: He refused AAA screening. Received PPSV23 and Flu vaccine today. Need for prophylactic vaccination against Streptococcus pneumoniae (pneumococcus)  -     Pneumococcal polysaccharide vaccine 23-valent PPSV23  Flu vaccine need  -     Influenza, FLUAD, (age 72 y+), IM, Preservative Free, 0.5 mL  Counseling regarding end of life decision making  -     Full code  Type 2 diabetes mellitus without complication, without long-term current use of insulin (HCC)  -     metFORMIN (GLUCOPHAGE) 500 MG tablet; TAKE 2 TABLETS BY MOUTH  TWICE DAILY WITH MEALS, Disp-120 tablet, R-0Requesting 1 year supplyNormal  -     Microalbumin / Creatinine Urine Ratio; Future      Recommendations for Preventive Services Due: see orders and patient instructions/AVS.  Recommended screening schedule for the next 5-10 years is provided to the patient in written form: see Patient Instructions/AVS.     Return in 1 year (on 9/29/2023) for Medicare Annual Wellness Visit in 1 year. Subjective     Patient's complete Health Risk Assessment and screening values have been reviewed and are found in Flowsheets. The following problems were reviewed today and where indicated follow up appointments were made and/or referrals ordered.     Positive Risk Factor Screenings with Interventions:             General Health and ACP:  General  In general, how would you say your health is?: Very Good  In the past 7 days, have you experienced any of the following: New or Increased Pain, New or Increased Fatigue, Loneliness, Social Isolation, Stress or Anger?: No  Do you get the social and emotional support that you need?: Yes  Do you have a Living Will?: (!) No    Advance Directives       Power of  Living Will ACP-Advance Directive ACP-Power of     Not on File Not on File Not on File Not on File          General Health Risk Interventions:  No Living Will: Advance Care Planning addressed with patient today    Health Habits/Nutrition:  Physical Activity: Sufficiently Active    Days of Exercise per Week: 7 days    Minutes of Exercise per Session: 120 min     Have you lost any weight without trying in the past 3 months?: No  Body mass index: (!) 33.15  Have you seen the dentist within the past year?: Yes  Health Habits/Nutrition Interventions:  Obesity. Patient was given healthy diet discussions about limiting calories. Objective   Vitals:    09/29/22 1305   BP: 118/80   Site: Left Upper Arm   Position: Sitting   Cuff Size: Large Adult   Pulse: 72   Temp: 97.8 °F (36.6 °C)   Weight: 237 lb 9.6 oz (107.8 kg)   Height: 5' 10.98\" (1.803 m)      Body mass index is 33.16 kg/m².       General Appearance: alert and oriented to person, place and time, well developed and well- nourished, in no acute distress  Skin: warm and dry, no rash or erythema  Head: normocephalic and atraumatic  Eyes: pupils equal, round, and reactive to light, extraocular eye movements intact, conjunctivae normal  ENT: tympanic membrane, external ear and ear canal normal bilaterally, nose without deformity, nasal mucosa and turbinates normal without polyps  Neck: supple and non-tender without mass, no thyromegaly or thyroid nodules, no cervical lymphadenopathy  Pulmonary/Chest: clear to auscultation bilaterally- no wheezes, rales or rhonchi, normal air movement, no respiratory distress  Cardiovascular: normal rate, regular rhythm, normal S1 and S2, no murmurs, rubs, clicks, or gallops, distal pulses intact, no carotid bruits  Abdomen: soft, non-tender, non-distended, normal bowel sounds, no masses or organomegaly  Extremities: no cyanosis, clubbing or edema  Musculoskeletal: normal range of motion, no joint swelling, deformity or tenderness  Neurologic: reflexes normal and symmetric, no cranial nerve deficit, gait, coordination and speech normal Allergies   Allergen Reactions    Dye [Iodides] Other (See Comments)     Nausea, alternating hot and cold and syncope. IV only     Prior to Visit Medications    Medication Sig Taking? Authorizing Provider   aspirin EC 81 MG EC tablet Take 81 mg by mouth daily Yes Historical Provider, MD   metFORMIN (GLUCOPHAGE) 500 MG tablet TAKE 2 TABLETS BY MOUTH  TWICE DAILY WITH MEALS Yes Sallie Si H Le, DO   Dulaglutide 3 MG/0.5ML SOPN Inject 3 mg into the skin once a week Yes Vinod Edwards MD   rosuvastatin (CRESTOR) 10 MG tablet TAKE 1 TABLET NIGHTLY Yes Claire Hernandez MD   blood glucose monitor kit and supplies Dispense glucometer covered by patient's insurance . Test blood sugar daily DX:E11.9 Yes Tiarra Ling DO   blood glucose monitor strips Test one time a day DX:E11.9 Yes Tiarra Ling DO   Lancets MISC Use to test blood sugar daily DX:E11.9 Yes Vinod Edwards MD   VITAMIN D PO Take by mouth daily Yes Historical Provider, MD     CareTeam (Including outside providers/suppliers regularly involved in providing care):   Patient Care Team:  Vinod Edwards MD as PCP - General (Internal Medicine)  Vinod Edwards MD as PCP - REHABILITATION [facility] Empaneled Provider     Reviewed and updated this visit:  Tobacco  Allergies  Meds  Problems  Med Hx  Surg Hx  Soc Hx  Fam Hx             Advance Care Planning     Advanced Care Planning: Discussed the patients choices for care and treatment in case of a health event that adversely affects decision-making abilities. Also discussed the patients long-term treatment options. Reviewed with the patient the appropriate state-specific advance directive documents. Reviewed the process of designating a competent adult as an Agent (or -in-fact) that could take make health care decisions for the patient if incompetent.  Patient was asked to complete the declaration forms, either acknowledge the forms by a public notary or an eligible witness and provide a signed copy to the practice office. Time spent (minutes): 5 minutes       Cardiovascular Disease Risk Counseling: Assessed the patient's risk to develop cardiovascular disease and reviewed main risk factors. Reviewed steps to reduce disease risk including:   Quitting tobacco use, reducing amount smoked, or not starting the habit  Making healthy food choices  Being physically active and gradualy increasing activity levels   Reduce weight and determine a healthy BMI goal  Monitor blood pressure and treat if higher than 140/90 mmHg  Maintain blood total cholesterol levels under 5 mmol/l or 190 mg/dl  Maintain LDL cholesterol levels under 3.0 mmol/l or 115 mg/dl   Control blood glucose levels  Consider taking aspirin (75 mg daily), once blood pressure is controlled   Provided a follow up plan.   Time spent (minutes): 10 minutes

## 2022-10-05 ENCOUNTER — NURSE ONLY (OUTPATIENT)
Dept: LAB | Age: 67
End: 2022-10-05

## 2022-10-05 DIAGNOSIS — E11.9 TYPE 2 DIABETES MELLITUS WITHOUT COMPLICATION, WITHOUT LONG-TERM CURRENT USE OF INSULIN (HCC): ICD-10-CM

## 2022-10-05 LAB
CREATININE, URINE: 44.6 MG/DL
MICROALBUMIN UR-MCNC: < 1.2 MG/DL
MICROALBUMIN/CREAT UR-RTO: 27 MG/G (ref 0–30)

## 2022-10-20 ENCOUNTER — TELEPHONE (OUTPATIENT)
Dept: INTERNAL MEDICINE CLINIC | Age: 67
End: 2022-10-20

## 2022-10-20 NOTE — TELEPHONE ENCOUNTER
Patient's wife called stating that [place] tested positive for Covid . Symptoms started on Monday . Wife states that [place] has a cough and ran a mild fever yesterday . Spoke with Dr. Mayra Malik and patient would qualify for Paxlovid . I offered to call the Paxlovid in for patient and he declined stating that his symptoms are not that bad and are better today . Instructed patient's wife on quarantining and self care and if symptoms of chest pain or SOB need to go to ER for evaluation.

## 2022-10-23 DIAGNOSIS — E11.9 TYPE 2 DIABETES MELLITUS WITHOUT COMPLICATION, WITHOUT LONG-TERM CURRENT USE OF INSULIN (HCC): ICD-10-CM

## 2022-10-27 ENCOUNTER — OFFICE VISIT (OUTPATIENT)
Dept: INTERNAL MEDICINE CLINIC | Age: 67
End: 2022-10-27

## 2022-10-27 VITALS
WEIGHT: 238.2 LBS | OXYGEN SATURATION: 97 % | DIASTOLIC BLOOD PRESSURE: 82 MMHG | BODY MASS INDEX: 33.24 KG/M2 | SYSTOLIC BLOOD PRESSURE: 116 MMHG | TEMPERATURE: 97.3 F | HEART RATE: 56 BPM

## 2022-10-27 DIAGNOSIS — E78.00 PURE HYPERCHOLESTEROLEMIA: ICD-10-CM

## 2022-10-27 DIAGNOSIS — E11.9 TYPE 2 DIABETES MELLITUS WITHOUT COMPLICATION, WITHOUT LONG-TERM CURRENT USE OF INSULIN (HCC): Primary | ICD-10-CM

## 2022-10-27 DIAGNOSIS — M54.2 TENDERNESS OF NECK: ICD-10-CM

## 2022-10-27 LAB — HBA1C MFR BLD: 6.4 % (ref 4.3–5.7)

## 2022-10-27 RX ORDER — GLUCOSAMINE HCL/CHONDROITIN SU 500-400 MG
CAPSULE ORAL
Qty: 100 STRIP | Refills: 3 | Status: SHIPPED | OUTPATIENT
Start: 2022-10-27

## 2022-10-27 RX ORDER — LANCETS 30 GAUGE
EACH MISCELLANEOUS
Qty: 100 EACH | Refills: 3 | Status: SHIPPED | OUTPATIENT
Start: 2022-10-27

## 2022-10-27 RX ORDER — METFORMIN HYDROCHLORIDE 500 MG/1
1000 TABLET, EXTENDED RELEASE ORAL
Qty: 180 TABLET | Refills: 3 | Status: SHIPPED | OUTPATIENT
Start: 2022-10-27

## 2022-10-27 NOTE — PROGRESS NOTES
[address] [facility] Internal Medicine   [facility] [address], [address] [facility]  Dept: [phone]  Dept Fax: [phone]    This is an established patient, here for an evaluation of the following chief complaint(s):  Hypertension, Diabetes, and Hyperlipidemia    ASSESSMENT AND PLAN     1. Type 2 diabetes mellitus without complication, without long-term current use of insulin ([address]    2. Tenderness of neck      # Non-Insulin Requiring Type 2 DM -Last HgA1c 6.4% - controlled, today 11/11/22   --Current DM therapy - Trulicity 3 mg weekly, Glucophage XR 1000 mg qd  --Previous DM therapy - Januvia 100 mg qd (stopped due to concern of side effects), Jardiance 25 mg qd (improved U6R), Trulicity (currently uptitrating dose), Glucophage XR 1,000 mg BID (changed to qd after improvement in W1Q with Trulicity)  --Reported symptomatic hypoglycemia: No  --Patient did not bring in blood glucose/fingerstick logs  --Most recent eye exam on 4/6/22 with Krystyna Lee OD. Findings: Worsening vision in both eyes improved with glasses. --9/2022 BMP: Renal function normal, eGFR > 60, microalbumin/cr ratio shows No proteinuria. --Patient denies foot lesions. Last foot exam  3/24/22. Denies neuropathy. --No reported symptoms of autonomic dysfunction. --Most recent LDL <70. On rosuvastatin - 10 MG. --Patient is on an Aspirin and statin. Is not on ACE/ARB due to controlled BP and no proteinuria. Plan: Patient with improved and stable A1c < 7.0%. Will change Glucophage XR to just 2 500 mg pills to be taken in the morning with breakfast (was taking 1,000 mg BID). Patient will continue with current dose of Trulicity. Will attempt to remove Glucophage altogether if A1c continues to improve. # Neck Tenderness -Has some tenderness in the left posterior neck region within the muscle. Appears MSK related. No neurologic symptoms. Has been lifting and working a lot.   Prior Tx/Therapy: None  Current Tx/Therapy: None    Plan: No further workup required. Will manage with topical Voltaren to be used as needed. # History of Stage III Prostate Adenocarcinoma -pT3a, N0, M0. Diagnosed in 2017. Prior Tx/Therapy: Radical prostatectomy in 2017  Current Tx/Therapy: None    -No ADT or radiation therapy  -PSAs have remained undetectable (< 0.02) last checked in September 2022  -No reported symptoms to suggest recurrence of disease    Plan: Continue surveillance with PSAs yearly as per NCCN guidelines. Orders Placed This Encounter   Procedures    POCT glycosylated hemoglobin (Hb A1C)     New Prescriptions    DICLOFENAC SODIUM (VOLTAREN) 1 % GEL    Apply a small amount to areas of pain    METFORMIN (GLUCOPHAGE XR) 500 MG EXTENDED RELEASE TABLET    Take 2 tablets by mouth daily (with breakfast)     The risks and benefits of my recommendations, as well as other treatment options (if indicated) were discussed with the patient today. Questions were answered. Follow up: 3 months and as needed. LOUISA Gutierrez (1955) is a pleasant 79 y.o. male here for a follow up visit for problems stated above. I last saw this patient on 6/16/2022. Current chronic medical conditions: Type II DM, HLD    Interval History & Review of Systems  Had COVID-19 last week. Symptoms of congestion. Did not require treatment. Symptoms have resolved now. No ED visits, urgent care visits or hospitalizations since last time I saw him  Has been taking only half of his metformin  Has some left neck pain. States he has been lifting a lot and working a lot. Inquired about plantar fasciitis. He has been having some symptoms of in his right foot. I advised him on stretching exercises to help recover. Reviewed September labs with him.   No headaches, CP, SOB, abdominal pain, N/V/C/D    Current Medications:  Outpatient Medications Marked as Taking for the 10/27/22 encounter (Office Visit) with Cleo Sosa MD   Medication Sig Dispense Refill Lancets MISC Use to test blood sugar daily DX:E11.9 100 each 3    blood glucose monitor strips Test one time a day DX:E11.9 100 strip 3    diclofenac sodium (VOLTAREN) 1 % GEL Apply a small amount to areas of pain 2 g 2    Dulaglutide 3 MG/0.5ML SOPN Inject 3 mg into the skin once a week 12 Adjustable Dose Pre-filled Pen Syringe 0    metFORMIN (GLUCOPHAGE XR) 500 MG extended release tablet Take 2 tablets by mouth daily (with breakfast) 180 tablet 3    aspirin EC 81 MG EC tablet Take 81 mg by mouth daily      rosuvastatin (CRESTOR) 10 MG tablet TAKE 1 TABLET NIGHTLY 90 tablet 0    blood glucose monitor kit and supplies Dispense glucometer covered by patient's insurance . Test blood sugar daily DX:E11.9 1 kit 0    VITAMIN D PO Take by mouth daily       PMH: has a past medical history of Benign essential tremor, History of Stage III Prostate Adenocarcinoma, Transient hypertension, Type 2 diabetes mellitus with hyperlipidemia ([address] and Umbilical hernia without obstruction and without gangrene. PSH: has a past surgical history that includes Biceps tendon repair (Right, 2015); hernia repair (05/02/2017); Prostatectomy (05/2017); eye surgery (07/31/2018); and Total knee arthroplasty (Bilateral, 2011). FH:family history includes Alzheimer's Disease in his mother; Cancer in his father; Other in his mother. SH: reports that he quit smoking about 11 years ago. His smoking use included cigarettes. He has a 10.00 pack-year smoking history. He has quit using smokeless tobacco. He reports current alcohol use. He reports that he does not use drugs. Allergies:is allergic to dye [iodides]. PHYSICAL EXAMINATION     Vitals:    10/27/22 1454   BP: 116/82   Pulse: 56   Temp: 97.3 °F (36.3 °C)   SpO2: 97%     Body mass index is 33.24 kg/m². General appearance: alert, well appearing, and in no distress and oriented to person, place, and time. Head; normocephalic, atraumatic.  JOAO. ENT- ENT exam normal, no neck nodes or sinus tenderness. Oropharyngeal exam - mucous membranes moist, pharynx normal without lesions. Neck exam - supple, no significant adenopathy. CVS exam: normal rate, regular rhythm, normal S1, S2, no murmurs, rubs, clicks or gallops. Chest: clear to auscultation, no wheezes, rales or rhonchi, symmetric air entry. Abdominal exam: soft, nontender, nondistended, no masses or organomegaly. Exam of extremities: peripheral pulses normal, no pedal edema, no clubbing or cyanosis  Musculoskeletal exam: no joint tenderness, deformity or swelling. Neurological exam reveals alert, oriented, normal speech, no focal findings or movement disorder noted. Skin exam - normal coloration and turgor, no rashes, no suspicious skin lesions noted. Mental Status: alert, oriented to person, place, and time.     MOST RECENT DIAGNOSTIC DATA     The Following Labs Were Reviewed Today:  Recent Results (from the past 2016 hour(s))   PSA Prostatic Specific Antigen    Collection Time: 09/22/22  7:18 AM   Result Value Ref Range    PSA <0.02 0.00 - 1.00 ng/mL   Basic Metabolic Panel    Collection Time: 09/22/22  7:18 AM   Result Value Ref Range    Sodium 140 135 - 145 meq/L    Potassium 4.4 3.5 - 5.2 meq/L    Chloride 105 98 - 111 meq/L    CO2 26 23 - 33 meq/L    Glucose 120 (H) 70 - 108 mg/dL    BUN 17 7 - 22 mg/dL    Creatinine 0.8 0.4 - 1.2 mg/dL    Calcium 9.4 8.5 - 10.5 mg/dL   Anion Gap    Collection Time: 09/22/22  7:18 AM   Result Value Ref Range    Anion Gap 9.0 8.0 - 16.0 meq/L   Glomerular Filtration Rate, Estimated    Collection Time: 09/22/22  7:18 AM   Result Value Ref Range    Est, Glom Filt Rate >90 ml/min/1.73m2   Microalbumin / Creatinine Urine Ratio    Collection Time: 10/05/22  9:56 AM   Result Value Ref Range    Microalbumin, Random Urine < 1.20 mg/dL    Creatinine, Urine 44.6 mg/dL    Microalb/Creat Ratio 27 0 - 30 mg/g   POCT glycosylated hemoglobin (Hb A1C)    Collection Time: 10/27/22  3:04 PM   Result Value Ref Range Hemoglobin A1C 6.4 (H) 4.3 - 5.7 %     Radiology last 30 days:  No results found.     Electronically signed by Bean Rothman MD on 10/27/22  Internal Medicine Resident PGY-3  [address], [address]  Case and plan reviewed with attending physician, Dr. Evelyn Bumpers, MD

## 2022-11-22 DIAGNOSIS — E11.69 TYPE 2 DIABETES MELLITUS WITH HYPERLIPIDEMIA (HCC): ICD-10-CM

## 2022-11-22 DIAGNOSIS — E78.5 TYPE 2 DIABETES MELLITUS WITH HYPERLIPIDEMIA (HCC): ICD-10-CM

## 2022-11-23 RX ORDER — ROSUVASTATIN CALCIUM 10 MG/1
TABLET, COATED ORAL
Qty: 90 TABLET | Refills: 1 | Status: SHIPPED | OUTPATIENT
Start: 2022-11-23

## 2022-12-24 PROBLEM — E78.00 PURE HYPERCHOLESTEROLEMIA: Status: ACTIVE | Noted: 2022-12-24

## 2023-01-31 ENCOUNTER — NURSE ONLY (OUTPATIENT)
Dept: LAB | Age: 68
End: 2023-01-31

## 2023-01-31 DIAGNOSIS — E11.9 TYPE 2 DIABETES MELLITUS WITHOUT COMPLICATION, WITHOUT LONG-TERM CURRENT USE OF INSULIN (HCC): ICD-10-CM

## 2023-01-31 DIAGNOSIS — E78.00 PURE HYPERCHOLESTEROLEMIA: ICD-10-CM

## 2023-01-31 LAB
ALT SERPL W/O P-5'-P-CCNC: 27 U/L (ref 11–66)
ANION GAP SERPL CALC-SCNC: 12 MEQ/L (ref 8–16)
BUN SERPL-MCNC: 18 MG/DL (ref 7–22)
CALCIUM SERPL-MCNC: 9.3 MG/DL (ref 8.5–10.5)
CHLORIDE SERPL-SCNC: 106 MEQ/L (ref 98–111)
CHOLESTEROL, FASTING: 108 MG/DL (ref 100–199)
CO2 SERPL-SCNC: 25 MEQ/L (ref 23–33)
CREAT SERPL-MCNC: 1 MG/DL (ref 0.4–1.2)
GFR SERPL CREATININE-BSD FRML MDRD: > 60 ML/MIN/1.73M2
GLUCOSE SERPL-MCNC: 140 MG/DL (ref 70–108)
HDLC SERPL-MCNC: 42 MG/DL
LDLC SERPL CALC-MCNC: 51 MG/DL
POTASSIUM SERPL-SCNC: 4.7 MEQ/L (ref 3.5–5.2)
SODIUM SERPL-SCNC: 143 MEQ/L (ref 135–145)
TRIGLYCERIDE, FASTING: 73 MG/DL (ref 0–199)

## 2023-02-02 ENCOUNTER — OFFICE VISIT (OUTPATIENT)
Dept: INTERNAL MEDICINE CLINIC | Age: 68
End: 2023-02-02

## 2023-02-02 VITALS
DIASTOLIC BLOOD PRESSURE: 70 MMHG | TEMPERATURE: 97 F | HEIGHT: 71 IN | WEIGHT: 249.2 LBS | SYSTOLIC BLOOD PRESSURE: 114 MMHG | HEART RATE: 76 BPM | BODY MASS INDEX: 34.89 KG/M2

## 2023-02-02 DIAGNOSIS — E11.9 TYPE 2 DIABETES MELLITUS WITHOUT COMPLICATION, WITHOUT LONG-TERM CURRENT USE OF INSULIN (HCC): Primary | ICD-10-CM

## 2023-02-02 DIAGNOSIS — R06.09 DYSPNEA ON EXERTION: ICD-10-CM

## 2023-02-02 DIAGNOSIS — I49.3 PVC (PREMATURE VENTRICULAR CONTRACTION): ICD-10-CM

## 2023-02-02 DIAGNOSIS — R06.02 SOB (SHORTNESS OF BREATH) ON EXERTION: ICD-10-CM

## 2023-02-02 LAB — HBA1C MFR BLD: 7.1 % (ref 4.3–5.7)

## 2023-02-02 RX ORDER — METFORMIN HYDROCHLORIDE 500 MG/1
1500 TABLET, EXTENDED RELEASE ORAL
Qty: 90 TABLET | Refills: 3 | Status: SHIPPED | OUTPATIENT
Start: 2023-03-03

## 2023-02-02 RX ORDER — METFORMIN HYDROCHLORIDE 500 MG/1
1500 TABLET, EXTENDED RELEASE ORAL
Qty: 90 TABLET | Refills: 0 | Status: SHIPPED | OUTPATIENT
Start: 2023-02-02 | End: 2023-02-23 | Stop reason: SDUPTHER

## 2023-02-02 SDOH — ECONOMIC STABILITY: HOUSING INSECURITY
IN THE LAST 12 MONTHS, WAS THERE A TIME WHEN YOU DID NOT HAVE A STEADY PLACE TO SLEEP OR SLEPT IN A SHELTER (INCLUDING NOW)?: NO

## 2023-02-02 SDOH — ECONOMIC STABILITY: FOOD INSECURITY: WITHIN THE PAST 12 MONTHS, THE FOOD YOU BOUGHT JUST DIDN'T LAST AND YOU DIDN'T HAVE MONEY TO GET MORE.: NEVER TRUE

## 2023-02-02 SDOH — ECONOMIC STABILITY: INCOME INSECURITY: HOW HARD IS IT FOR YOU TO PAY FOR THE VERY BASICS LIKE FOOD, HOUSING, MEDICAL CARE, AND HEATING?: NOT HARD AT ALL

## 2023-02-02 SDOH — ECONOMIC STABILITY: FOOD INSECURITY: WITHIN THE PAST 12 MONTHS, YOU WORRIED THAT YOUR FOOD WOULD RUN OUT BEFORE YOU GOT MONEY TO BUY MORE.: NEVER TRUE

## 2023-02-02 ASSESSMENT — ENCOUNTER SYMPTOMS
DIARRHEA: 0
BACK PAIN: 0
ABDOMINAL PAIN: 0
VOMITING: 0
NAUSEA: 0
COUGH: 0
SORE THROAT: 0
SHORTNESS OF BREATH: 0
WHEEZING: 0
RHINORRHEA: 0

## 2023-02-02 ASSESSMENT — PATIENT HEALTH QUESTIONNAIRE - PHQ9
SUM OF ALL RESPONSES TO PHQ QUESTIONS 1-9: 0
SUM OF ALL RESPONSES TO PHQ QUESTIONS 1-9: 0
2. FEELING DOWN, DEPRESSED OR HOPELESS: 0
SUM OF ALL RESPONSES TO PHQ QUESTIONS 1-9: 0
SUM OF ALL RESPONSES TO PHQ QUESTIONS 1-9: 0
1. LITTLE INTEREST OR PLEASURE IN DOING THINGS: 0
SUM OF ALL RESPONSES TO PHQ9 QUESTIONS 1 & 2: 0

## 2023-02-04 ENCOUNTER — NURSE ONLY (OUTPATIENT)
Dept: LAB | Age: 68
End: 2023-02-04

## 2023-02-04 DIAGNOSIS — I49.3 PVC (PREMATURE VENTRICULAR CONTRACTION): ICD-10-CM

## 2023-02-04 LAB
MAGNESIUM SERPL-MCNC: 2 MG/DL (ref 1.6–2.4)
TSH SERPL DL<=0.005 MIU/L-ACNC: 1.32 UIU/ML (ref 0.4–4.2)

## 2023-02-07 ENCOUNTER — HOSPITAL ENCOUNTER (OUTPATIENT)
Dept: NON INVASIVE DIAGNOSTICS | Age: 68
Discharge: HOME | End: 2023-02-07
Payer: COMMERCIAL

## 2023-02-07 DIAGNOSIS — R06.09 DYSPNEA ON EXERTION: ICD-10-CM

## 2023-02-07 DIAGNOSIS — E11.9 TYPE 2 DIABETES MELLITUS WITHOUT COMPLICATION, WITHOUT LONG-TERM CURRENT USE OF INSULIN (HCC): ICD-10-CM

## 2023-02-07 DIAGNOSIS — I49.3 PVC (PREMATURE VENTRICULAR CONTRACTION): ICD-10-CM

## 2023-02-07 DIAGNOSIS — R06.02 SOB (SHORTNESS OF BREATH) ON EXERTION: ICD-10-CM

## 2023-02-07 PROCEDURE — 93017 CV STRESS TEST TRACING ONLY: CPT | Performed by: INTERNAL MEDICINE

## 2023-02-07 PROCEDURE — 3430000000 HC RX DIAGNOSTIC RADIOPHARMACEUTICAL: Performed by: INTERNAL MEDICINE

## 2023-02-07 PROCEDURE — A9500 TC99M SESTAMIBI: HCPCS | Performed by: INTERNAL MEDICINE

## 2023-02-07 PROCEDURE — 6360000002 HC RX W HCPCS

## 2023-02-07 PROCEDURE — 78452 HT MUSCLE IMAGE SPECT MULT: CPT | Performed by: INTERNAL MEDICINE

## 2023-02-07 RX ORDER — TECHNETIUM TC-99M SESTAMIBI 1 MG/10ML
9.6 INJECTION INTRAVENOUS
Status: COMPLETED | OUTPATIENT
Start: 2023-02-07 | End: 2023-02-07

## 2023-02-07 RX ORDER — TECHNETIUM TC-99M SESTAMIBI 1 MG/10ML
34.1 INJECTION INTRAVENOUS
Status: COMPLETED | OUTPATIENT
Start: 2023-02-07 | End: 2023-02-07

## 2023-02-07 RX ADMIN — Medication 9.6 MILLICURIE: at 13:22

## 2023-02-07 RX ADMIN — Medication 34.1 MILLICURIE: at 14:55

## 2023-02-23 ENCOUNTER — OFFICE VISIT (OUTPATIENT)
Dept: INTERNAL MEDICINE CLINIC | Age: 68
End: 2023-02-23

## 2023-02-23 VITALS
SYSTOLIC BLOOD PRESSURE: 132 MMHG | RESPIRATION RATE: 18 BRPM | OXYGEN SATURATION: 95 % | TEMPERATURE: 100.6 F | BODY MASS INDEX: 34.83 KG/M2 | WEIGHT: 249.6 LBS | DIASTOLIC BLOOD PRESSURE: 78 MMHG | HEART RATE: 78 BPM

## 2023-02-23 DIAGNOSIS — R06.09 DYSPNEA ON EXERTION: ICD-10-CM

## 2023-02-23 DIAGNOSIS — R06.02 SOB (SHORTNESS OF BREATH) ON EXERTION: ICD-10-CM

## 2023-02-23 DIAGNOSIS — E11.9 TYPE 2 DIABETES MELLITUS WITHOUT COMPLICATION, WITHOUT LONG-TERM CURRENT USE OF INSULIN (HCC): ICD-10-CM

## 2023-02-23 RX ORDER — METFORMIN HYDROCHLORIDE 500 MG/1
1500 TABLET, EXTENDED RELEASE ORAL
Qty: 90 TABLET | Refills: 3 | Status: SHIPPED | OUTPATIENT
Start: 2023-02-23 | End: 2023-06-23

## 2023-02-23 NOTE — PROGRESS NOTES
1955    Chief Complaint   Patient presents with    Diabetes    Shortness of Breath     S/P stress test        Pt is a 79 y.o. male who presents for a follow-up visit. She is here for evaluation of DMII and SOB/VALENTIN. Previous visit 2/02/2023 noted increasing A1c after decreasing Metformin dose and SOB/VALENTIN after exercise. Reported Blood glucose running 130-140 resting and 110 after exercise; he has not brought his monitor w/ him today. Last eye exam 4/6/22 performed by Dr. Carolyn Hollingsworth w/o noted DM retinopathy; reminded to have annual eye exams. Normal renal function noted. Denies neuropathy. Reports continued exercise 5x a week. Has not had recurrence of SOB/VALENTIN since first episodes. Stress test negative for ischemia. Patient has no further concerns at this time.       Past Medical History:   Diagnosis Date    Benign essential tremor     History of Stage III Prostate Adenocarcinoma 03/21/2017    Prostate removed May 2017    Transient hypertension     Well controlled on no antihypertensives    Type 2 diabetes mellitus with hyperlipidemia ([address]     Umbilical hernia without obstruction and without gangrene 05/02/2017       Past Surgical History:   Procedure Laterality Date    BICEPS TENDON REPAIR Right 2015    EYE SURGERY  07/31/2018      Left    HERNIA REPAIR  05/02/2017    [address]  05/2017    [address] Bilateral 2011       Current Outpatient Medications   Medication Sig Dispense Refill    metFORMIN (GLUCOPHAGE XR) 500 MG extended release tablet Take 3 tablets by mouth daily (with breakfast) 90 tablet 3    MULTIPLE VITAMIN PO Take by mouth daily      Dulaglutide 3 MG/0.5ML SOPN Inject 3 mg into the skin once a week 12 Adjustable Dose Pre-filled Pen Syringe 0    [START ON 3/3/2023] metFORMIN (GLUCOPHAGE XR) 500 MG extended release tablet Take 3 tablets by mouth daily (with breakfast) First fill date 3/3/2023 90 tablet 3 rosuvastatin (CRESTOR) 10 MG tablet TAKE 1 TABLET NIGHTLY 90 tablet 1    Lancets MISC Use to test blood sugar daily DX:E11.9 100 each 3    blood glucose monitor strips Test one time a day DX:E11.9 100 strip 3    diclofenac sodium (VOLTAREN) 1 % GEL Apply a small amount to areas of pain 2 g 2    aspirin EC 81 MG EC tablet Take 81 mg by mouth daily      blood glucose monitor kit and supplies Dispense glucometer covered by patient's insurance . Test blood sugar daily DX:E11.9 1 kit 0     No current facility-administered medications for this visit. Allergies   Allergen Reactions    Dye [Iodides] Other (See Comments)     Nausea, alternating hot and cold and syncope. IV only       Social History     Socioeconomic History    Marital status:      Spouse name: Not on file    Number of children: Not on file    Years of education: Not on file    Highest education level: Not on file   Occupational History    Not on file   Tobacco Use    Smoking status: Former     Packs/day: 0.33     Years: 10.00     Pack years: 3.30     Types: Cigarettes     Quit date: 3/21/2011     Years since quittin.9    Smokeless tobacco: Former   Substance and Sexual Activity    Alcohol use: Yes     Comment: rarely    Drug use: No    Sexual activity: Not on file   Other Topics Concern    Not on file   Social History Narrative    Not on file     Social Determinants of Health     Financial Resource Strain: Low Risk     Difficulty of Paying Living Expenses: Not hard at all   Food Insecurity: No Food Insecurity    Worried About [address] in the Last Year: Never true    [facility] in the Last Year: Never true   Transportation Needs: Unknown    Lack of Transportation (Medical): Not on file    Lack of Transportation (Non-Medical):  No   Physical Activity: Sufficiently Active    Days of Exercise per Week: 7 days    Minutes of Exercise per Session: 120 min   Stress: Not on file   Social Connections: Not on file   Intimate Partner Violence: Not on file   Housing Stability: Unknown    Unable to Pay for Housing in the Last Year: Not on file    Number of Places Lived in the Last Year: Not on file    Unstable Housing in the Last Year: No       Family History   Problem Relation Age of Onset    Other Mother         alzheimers    Alzheimer's Disease Mother     Cancer Father         prostate       Review of Systems - General ROS: negative for - chills or fever. Psychological ROS: negative for - anxiety and depression  Hematological and Lymphatic ROS: No history of blood clots or bleeding disorder. Respiratory ROS: no cough, shortness of breath, or wheezing  Cardiovascular ROS: no chest pain or dyspnea on exertion, tolerates a flight of stairs, vacuuming  Gastrointestinal ROS: no abdominal pain, change in bowel habits, or black or bloody stools  Genito-Urinary ROS: no dysuria, trouble voiding, or hematuria  Musculoskeletal ROS: negative for - muscle pain or muscular weakness,   Neurological ROS: negative for - headaches, numbness/tingling, seizures or weakness  Dermatological ROS: negative for - rash or skin lesion changes    Blood pressure 132/78, pulse 78, temperature (!) 100.6 °F (38.1 °C), temperature source Temporal, resp. rate 18, weight 249 lb 9.6 oz (113.2 kg), SpO2 95 %. Physical Examination: General appearance -alert, well appearing, and in no distress.  obese  Mental status - alert, oriented to person, place, and time  Head - atraumatic, normocephalic  Eyes - pupils equal and reactive to light, extraocular muscles intact  Ears - external ears are normal, hearing is grossly normal  Mouth - oral pharynx clear, mucous membranes moist  Neck - supple, no significant adenopathy  Chest - clear to auscultation, no wheezes, rales or rhonchi, symmetric air entry  Heart - normal rate, regular rhythm, normal S1, S2, no murmurs, rubs, clicks or gallops  Abdomen -soft, nontender, nondistended  Neurological - alert, oriented, cranial nerves II-XII intact, motor and sensation are grossly intact bilateral upper and lower extremities. Extremities - peripheral pulses normal, no pedal edema, no clubbing or cyanosis  Skin - warm and dry    Diagnostic data:   I have reviewed recent diagnostic testing including labs with patient today. Assessment and Plan:     Diagnosis Orders   1. Type 2 diabetes mellitus without complication, without long-term current use of insulin (HCC)  metFORMIN (GLUCOPHAGE XR) 500 MG extended release tablet    Hemoglobin A1C      2. SOB (shortness of breath) on exertion        3. Dyspnea on exertion          Poorly controlled NIDDMII: A1c 7.1 (2/02/2023). On Metformin 0690EO daily and Trulicity 3mg weekly. Compliant w/ meds, exercise but not diet. Reinforced good diet modification and compliance. Refill ordered for Metformin. A1c ordered for 3 months f/u. Reminded to have annual eye exam. F/u in 3 months. SOB/VALENTIN, resolved: no sig personal nor FH cardiac hx. 1 symptomatic episodes w/o supplemental O2 requirement start of 2/2023. No recurrence. Patient continues to exercise w/o difficulties. TSH/FT4 WNL. Stress test (2/2023) negative for ischemia. No indication for further w/u. HLD: continued home statin    Staffed with: Dr. Raúl Forrester [address] INTERNAL MEDICINE  [address]  Dept: [phone]  Dept Fax: [phone] : [phone]

## 2023-05-08 ENCOUNTER — NURSE ONLY (OUTPATIENT)
Dept: LAB | Age: 68
End: 2023-05-08

## 2023-05-08 DIAGNOSIS — E11.9 TYPE 2 DIABETES MELLITUS WITHOUT COMPLICATION, WITHOUT LONG-TERM CURRENT USE OF INSULIN (HCC): ICD-10-CM

## 2023-05-08 LAB
DEPRECATED MEAN GLUCOSE BLD GHB EST-ACNC: 135 MG/DL (ref 70–126)
HBA1C MFR BLD HPLC: 6.5 % (ref 4.4–6.4)

## 2023-05-09 DIAGNOSIS — E11.9 TYPE 2 DIABETES MELLITUS WITHOUT COMPLICATION, WITHOUT LONG-TERM CURRENT USE OF INSULIN (HCC): ICD-10-CM

## 2023-05-09 RX ORDER — METFORMIN HYDROCHLORIDE 500 MG/1
1500 TABLET, EXTENDED RELEASE ORAL
Qty: 270 TABLET | Refills: 3 | Status: SHIPPED | OUTPATIENT
Start: 2023-05-09

## 2023-05-09 NOTE — TELEPHONE ENCOUNTER
Refill request received    Date of last visit 2/23 Dr. Stevan Long. Asad Carcamo    Date of next visit 5/25/25

## 2023-05-11 DIAGNOSIS — E78.5 TYPE 2 DIABETES MELLITUS WITH HYPERLIPIDEMIA (HCC): ICD-10-CM

## 2023-05-11 DIAGNOSIS — E11.69 TYPE 2 DIABETES MELLITUS WITH HYPERLIPIDEMIA (HCC): ICD-10-CM

## 2023-05-14 RX ORDER — ROSUVASTATIN CALCIUM 10 MG/1
TABLET, COATED ORAL
Qty: 90 TABLET | Refills: 1 | Status: SHIPPED | OUTPATIENT
Start: 2023-05-14

## 2023-05-25 ENCOUNTER — OFFICE VISIT (OUTPATIENT)
Dept: INTERNAL MEDICINE CLINIC | Age: 68
End: 2023-05-25
Payer: COMMERCIAL

## 2023-05-25 VITALS
HEART RATE: 77 BPM | WEIGHT: 247 LBS | DIASTOLIC BLOOD PRESSURE: 82 MMHG | HEIGHT: 70 IN | SYSTOLIC BLOOD PRESSURE: 128 MMHG | BODY MASS INDEX: 35.36 KG/M2 | OXYGEN SATURATION: 96 %

## 2023-05-25 DIAGNOSIS — E11.9 TYPE 2 DIABETES MELLITUS WITHOUT COMPLICATION, WITHOUT LONG-TERM CURRENT USE OF INSULIN (HCC): Primary | ICD-10-CM

## 2023-05-25 DIAGNOSIS — E78.00 PURE HYPERCHOLESTEROLEMIA: ICD-10-CM

## 2023-05-25 PROCEDURE — 1123F ACP DISCUSS/DSCN MKR DOCD: CPT

## 2023-05-25 PROCEDURE — 3044F HG A1C LEVEL LT 7.0%: CPT

## 2023-05-25 PROCEDURE — 99214 OFFICE O/P EST MOD 30 MIN: CPT

## 2023-05-25 NOTE — PROGRESS NOTES
S1, S2, no murmurs, rubs, clicks or gallops  Abdomen -soft, nontender, nondistended  Neurological - alert, oriented, cranial nerves II-XII intact, motor and sensation are grossly intact bilateral upper and lower extremities. Extremities - peripheral pulses normal, no pedal edema, no clubbing or cyanosis  Skin - warm and dry    Diagnostic data:   I have reviewed recent diagnostic testing including labs with patient today. Assessment and Plan:     Diagnosis Orders   1. Type 2 diabetes mellitus without complication, without long-term current use of insulin (HCC)  Microalbumin / Creatinine Urine Ratio    Hemoglobin N5G    Basic Metabolic Panel    Lipid Panel      2. Pure hypercholesterolemia             Well controlled NIDDMII: A1c 6.5 (5/2023). On Metformin 8566YJ daily and Trulicity 3mg weekly. Compliant w/ meds, exercise but not diet. Reinforced good diet modification and compliance. A1c, BMP, Lipids, microalbumin ordered for 3 months f/u. F/u in 3 months. Plan to increase Trulicity and decrease Metformin at that time. HLD: continued home statin. Lipid panel for next visit. Staffed with: Dr. Jm Hollingsworth. Darrel Bonilla.  [address]  Dept: [phone]  Dept Fax: [phone] : [phone]

## 2023-08-19 ENCOUNTER — NURSE ONLY (OUTPATIENT)
Dept: LAB | Age: 68
End: 2023-08-19

## 2023-08-19 DIAGNOSIS — E11.9 TYPE 2 DIABETES MELLITUS WITHOUT COMPLICATION, WITHOUT LONG-TERM CURRENT USE OF INSULIN (HCC): ICD-10-CM

## 2023-08-19 LAB
ANION GAP SERPL CALC-SCNC: 9 MEQ/L (ref 8–16)
BUN SERPL-MCNC: 18 MG/DL (ref 7–22)
CALCIUM SERPL-MCNC: 9.4 MG/DL (ref 8.5–10.5)
CHLORIDE SERPL-SCNC: 106 MEQ/L (ref 98–111)
CHOLEST SERPL-MCNC: 113 MG/DL (ref 100–199)
CO2 SERPL-SCNC: 26 MEQ/L (ref 23–33)
CREAT SERPL-MCNC: 0.9 MG/DL (ref 0.4–1.2)
CREAT UR-MCNC: 202.1 MG/DL
DEPRECATED MEAN GLUCOSE BLD GHB EST-ACNC: 129 MG/DL (ref 70–126)
GFR SERPL CREATININE-BSD FRML MDRD: > 60 ML/MIN/1.73M2
GLUCOSE SERPL-MCNC: 120 MG/DL (ref 70–108)
HBA1C MFR BLD HPLC: 6.3 % (ref 4.4–6.4)
HDLC SERPL-MCNC: 45 MG/DL
LDLC SERPL CALC-MCNC: 54 MG/DL
MICROALBUMIN UR-MCNC: < 1.2 MG/DL
MICROALBUMIN/CREAT RATIO PNL UR: 6 MG/G (ref 0–30)
POTASSIUM SERPL-SCNC: 4.9 MEQ/L (ref 3.5–5.2)
SODIUM SERPL-SCNC: 141 MEQ/L (ref 135–145)
TRIGL SERPL-MCNC: 68 MG/DL (ref 0–199)

## 2023-08-31 ENCOUNTER — OFFICE VISIT (OUTPATIENT)
Dept: INTERNAL MEDICINE CLINIC | Age: 68
End: 2023-08-31

## 2023-08-31 VITALS
HEART RATE: 78 BPM | SYSTOLIC BLOOD PRESSURE: 143 MMHG | DIASTOLIC BLOOD PRESSURE: 85 MMHG | OXYGEN SATURATION: 97 % | BODY MASS INDEX: 34.56 KG/M2 | WEIGHT: 241.4 LBS | HEIGHT: 70 IN

## 2023-08-31 DIAGNOSIS — E11.9 TYPE 2 DIABETES MELLITUS WITHOUT COMPLICATION, WITHOUT LONG-TERM CURRENT USE OF INSULIN (HCC): Primary | ICD-10-CM

## 2023-08-31 DIAGNOSIS — R03.0 TRANSIENT HYPERTENSION: ICD-10-CM

## 2023-08-31 DIAGNOSIS — M72.2 PLANTAR FASCIITIS: ICD-10-CM

## 2023-08-31 RX ORDER — DULAGLUTIDE 4.5 MG/.5ML
4.5 INJECTION, SOLUTION SUBCUTANEOUS WEEKLY
Qty: 12 ADJUSTABLE DOSE PRE-FILLED PEN SYRINGE | Refills: 3 | Status: SHIPPED | OUTPATIENT
Start: 2023-09-21

## 2023-08-31 RX ORDER — DULAGLUTIDE 3 MG/.5ML
3 INJECTION, SOLUTION SUBCUTANEOUS WEEKLY
Qty: 4 ADJUSTABLE DOSE PRE-FILLED PEN SYRINGE | Refills: 0 | Status: SHIPPED | OUTPATIENT
Start: 2023-08-31

## 2023-08-31 NOTE — PATIENT INSTRUCTIONS
Follow up in 2 months for Blood pressure recheck  Trulicity 3 mg for four weeks  After completing Trulicity 3 mg start 4.5 mg and stop metformin

## 2023-08-31 NOTE — PROGRESS NOTES
Chief Complaint   Patient presents with    Diabetes    Obesity     Gabino Waggoner is a 42-year-old male who presents for diabetes mellitus follow-up. Patient follows with Dr. Slava Ruth MD as her PCP. Patient was last seen in the internal medicine residency clinic on 5/25/2023 and at that time plan to continue Trulicity 3 mg for another 3 months and continue patient's metformin at twice daily dosing. Patient has no questions or concerns today. He is not having side effects with medication. Denies abdominal pain nausea, vomiting, diarrhea or constipation. No shortness of breath chest pain or palpitations. Patient does check his blood pressure with fingersticks daily which states range at approximately 120. Patient's last eye exam was April 2023 with Dr. Tian Trent. No known history of diabetic retinopathy. No renal disease seen on most recent BMP. Most recent A1c on 8/19/2023 was 6.3. Patient adds that he ran out of Trulicity approximately 3 weeks ago. At that time patient increased his metformin dose from twice daily to 3 times daily. Jeannette Greenfield denies any numbness tingling or diabetic foot ulcers at this time. He states that he has been following with a podiatrist for plantar fasciitis and was receiving oral steroids. Patient does have supportive footwear of [employer] and wears slippers at home. Patient states he does do some stretching foot exercises. Patient states his Planter fasciitis is still bothering him at this time.     Past Medical History:   Diagnosis Date    Benign essential tremor     History of Stage III Prostate Adenocarcinoma 03/21/2017    Prostate removed May 2017    Transient hypertension     Well controlled on no antihypertensives    Type 2 diabetes mellitus with hyperlipidemia ([address])     Umbilical hernia without obstruction and without gangrene 05/02/2017     Past Surgical History:   Procedure Laterality Date    BICEPS TENDON REPAIR Right 2015    EYE SURGERY  07/31/2018    [facility]

## 2023-09-01 DIAGNOSIS — E78.5 TYPE 2 DIABETES MELLITUS WITH HYPERLIPIDEMIA (HCC): ICD-10-CM

## 2023-09-01 DIAGNOSIS — E11.69 TYPE 2 DIABETES MELLITUS WITH HYPERLIPIDEMIA (HCC): ICD-10-CM

## 2023-09-06 RX ORDER — ROSUVASTATIN CALCIUM 10 MG/1
TABLET, COATED ORAL
Qty: 90 TABLET | Refills: 3 | Status: SHIPPED | OUTPATIENT
Start: 2023-09-06

## 2023-11-02 ENCOUNTER — OFFICE VISIT (OUTPATIENT)
Dept: INTERNAL MEDICINE CLINIC | Age: 68
End: 2023-11-02

## 2023-11-02 VITALS
WEIGHT: 237.6 LBS | TEMPERATURE: 98.2 F | SYSTOLIC BLOOD PRESSURE: 120 MMHG | HEART RATE: 92 BPM | HEIGHT: 70 IN | BODY MASS INDEX: 34.01 KG/M2 | DIASTOLIC BLOOD PRESSURE: 66 MMHG

## 2023-11-02 DIAGNOSIS — E11.9 TYPE 2 DIABETES MELLITUS WITHOUT COMPLICATION, WITHOUT LONG-TERM CURRENT USE OF INSULIN (HCC): Primary | ICD-10-CM

## 2023-11-02 DIAGNOSIS — R03.0 TRANSIENT HYPERTENSION: ICD-10-CM

## 2023-11-02 NOTE — PROGRESS NOTES
Chief Complaint   Patient presents with    Diabetes     2 months / A1c 6.3 on 8/19    Hypertension     Gabino Jett is a 77-year-old male who presents for 2-month follow-up for diabetes recheck as well as blood pressure check. Patient follows with Dr. Shahrzad Robles MD as his PCP. Patient was last seen at the internal medicine residency clinic on 8/31/2023 where at that time patient's Trulicity was restarted and plan to continue patient's previous dose of 3 mg for 4 weeks followed by increased dose to 4.5 mg with discontinuation of metformin at that time. Patient notes that he increased his Trulicity to 4.5 mg on 44/0/2937 and he discontinued metformin on 10/9/2023. He notes that his blood sugars at home have been ranging between 103-151 when he is checked them. Patient is tolerating the Trulicity and the increased dose. He denies any side effects at this time. Additionally that time patient was complaining of suspected planter fasciitis and he was referred to podiatry for further evaluation. Patient notes he did follow-up with podiatry and received a steroid shot in the last 2 months and has noticed large improvement. He is able to walk without any pain at this time. Patient plans to follow-up with Dr. Daniel Martinez of podiatry in 1 week. Patient adds that he has had 2 steroid injections with the most recent one being 3 weeka ago. Patient is currently on steroid eyedrops due to bloodshot eye that is fine with optometry for. Patient states she does have 1 more week of eyedrops. Patient was also noted to have transient hypotension on last visit we elected to continue monitor and recheck today. Patient's BP was 120/66 at this appointment. Patient denies fever, chills, headache, dizziness, lightheadedness, numbness, tingling, chest pain, chest pressure, shortness of breath, difficulty breathing, abdominal pain nausea or vomiting at this time.   No change in bowel habits no pain or difficulty with

## 2023-12-07 ENCOUNTER — OFFICE VISIT (OUTPATIENT)
Dept: INTERNAL MEDICINE CLINIC | Age: 68
End: 2023-12-07
Payer: MEDICARE

## 2023-12-07 VITALS
DIASTOLIC BLOOD PRESSURE: 74 MMHG | BODY MASS INDEX: 33.47 KG/M2 | WEIGHT: 233.8 LBS | HEART RATE: 68 BPM | HEIGHT: 70 IN | TEMPERATURE: 98.3 F | SYSTOLIC BLOOD PRESSURE: 118 MMHG

## 2023-12-07 DIAGNOSIS — Z85.46 HISTORY OF PROSTATE CANCER: ICD-10-CM

## 2023-12-07 DIAGNOSIS — E11.9 TYPE 2 DIABETES MELLITUS WITHOUT COMPLICATION, WITHOUT LONG-TERM CURRENT USE OF INSULIN (HCC): ICD-10-CM

## 2023-12-07 DIAGNOSIS — Z00.00 MEDICARE ANNUAL WELLNESS VISIT, SUBSEQUENT: Primary | ICD-10-CM

## 2023-12-07 PROCEDURE — 1123F ACP DISCUSS/DSCN MKR DOCD: CPT

## 2023-12-07 PROCEDURE — G0439 PPPS, SUBSEQ VISIT: HCPCS

## 2023-12-07 PROCEDURE — 3044F HG A1C LEVEL LT 7.0%: CPT

## 2023-12-07 RX ORDER — ERYTHROMYCIN 5 MG/G
OINTMENT OPHTHALMIC
COMMUNITY
Start: 2023-11-09

## 2023-12-07 RX ORDER — KETOROLAC TROMETHAMINE 5 MG/ML
SOLUTION OPHTHALMIC
COMMUNITY
Start: 2023-11-08

## 2023-12-07 RX ORDER — MULTIVIT-MINERALS/FOLIC ACID 120 MCG
1 TABLET,CHEWABLE ORAL DAILY
COMMUNITY

## 2023-12-07 NOTE — PATIENT INSTRUCTIONS
Consider RSV vaccine at the pharmacy  Consider AAA screening which abdominal US  Decrease portion sizes and watch diet of sugar and carbohydrate     Follow up in Feb 2024, please get lab work beforehand    Starting a Weight Loss Plan: Care Instructions  Overview     If you're thinking about losing weight, it can be hard to know where to start. Your doctor can help you set up a weight loss plan that best meets your needs. You may want to take a class on nutrition or exercise, or you could join a weight loss support group. If you have questions about how to make changes to your eating or exercise habits, ask your doctor about seeing a registered dietitian or an exercise specialist.  It can be a big challenge to lose weight. But you don't have to make huge changes at once. Make small changes, and stick with them. When those changes become habit, add a few more changes. If you don't think you're ready to make changes right now, try to pick a date in the future. Make an appointment to see your doctor to discuss whether the time is right for you to start a plan. Follow-up care is a key part of your treatment and safety. Be sure to make and go to all appointments, and call your doctor if you are having problems. It's also a good idea to know your test results and keep a list of the medicines you take. How can you care for yourself at home? Set realistic goals. Many people expect to lose much more weight than is likely. A weight loss of 5% to 10% of your body weight may be enough to improve your health. Get family and friends involved to provide support. Talk to them about why you are trying to lose weight, and ask them to help. They can help by participating in exercise and having meals with you, even if they may be eating something different. Find what works best for you. If you do not have time or do not like to cook, a program that offers meal replacement bars or shakes may be better for you.  Or if you like to

## 2024-01-27 ENCOUNTER — NURSE ONLY (OUTPATIENT)
Dept: LAB | Age: 69
End: 2024-01-27

## 2024-01-27 DIAGNOSIS — Z85.46 HISTORY OF PROSTATE CANCER: ICD-10-CM

## 2024-01-27 DIAGNOSIS — E11.9 TYPE 2 DIABETES MELLITUS WITHOUT COMPLICATION, WITHOUT LONG-TERM CURRENT USE OF INSULIN (HCC): ICD-10-CM

## 2024-01-27 LAB
ALBUMIN SERPL BCG-MCNC: 4.4 G/DL (ref 3.5–5.1)
ALP SERPL-CCNC: 54 U/L (ref 38–126)
ALT SERPL W/O P-5'-P-CCNC: 24 U/L (ref 11–66)
ANION GAP SERPL CALC-SCNC: 6 MEQ/L (ref 8–16)
AST SERPL-CCNC: 22 U/L (ref 5–40)
BILIRUB CONJ SERPL-MCNC: < 0.2 MG/DL (ref 0–0.3)
BILIRUB SERPL-MCNC: 0.6 MG/DL (ref 0.3–1.2)
BUN SERPL-MCNC: 18 MG/DL (ref 7–22)
CALCIUM SERPL-MCNC: 9.7 MG/DL (ref 8.5–10.5)
CHLORIDE SERPL-SCNC: 107 MEQ/L (ref 98–111)
CO2 SERPL-SCNC: 30 MEQ/L (ref 23–33)
CREAT SERPL-MCNC: 0.9 MG/DL (ref 0.4–1.2)
GFR SERPL CREATININE-BSD FRML MDRD: > 60 ML/MIN/1.73M2
GLUCOSE SERPL-MCNC: 149 MG/DL (ref 70–108)
POTASSIUM SERPL-SCNC: 4.9 MEQ/L (ref 3.5–5.2)
PROT SERPL-MCNC: 7.3 G/DL (ref 6.1–8)
PSA SERPL-MCNC: < 0.02 NG/ML (ref 0–1)
SODIUM SERPL-SCNC: 143 MEQ/L (ref 135–145)

## 2024-02-01 ENCOUNTER — OFFICE VISIT (OUTPATIENT)
Dept: INTERNAL MEDICINE CLINIC | Age: 69
End: 2024-02-01

## 2024-02-01 VITALS
BODY MASS INDEX: 34.24 KG/M2 | DIASTOLIC BLOOD PRESSURE: 68 MMHG | SYSTOLIC BLOOD PRESSURE: 120 MMHG | HEART RATE: 76 BPM | HEIGHT: 70 IN | WEIGHT: 239.2 LBS | TEMPERATURE: 97.9 F

## 2024-02-01 DIAGNOSIS — R03.0 TRANSIENT HYPERTENSION: ICD-10-CM

## 2024-02-01 DIAGNOSIS — R05.3 CHRONIC COUGH: ICD-10-CM

## 2024-02-01 DIAGNOSIS — E78.5 TYPE 2 DIABETES MELLITUS WITH HYPERLIPIDEMIA (HCC): Primary | Chronic | ICD-10-CM

## 2024-02-01 DIAGNOSIS — E11.69 TYPE 2 DIABETES MELLITUS WITH HYPERLIPIDEMIA (HCC): Primary | Chronic | ICD-10-CM

## 2024-02-01 LAB — HBA1C MFR BLD: 7.4 % (ref 4.3–5.7)

## 2024-02-01 RX ORDER — DULAGLUTIDE 4.5 MG/.5ML
4.5 INJECTION, SOLUTION SUBCUTANEOUS WEEKLY
Qty: 12 ADJUSTABLE DOSE PRE-FILLED PEN SYRINGE | Refills: 3 | Status: SHIPPED | OUTPATIENT
Start: 2024-02-01

## 2024-02-01 NOTE — PATIENT INSTRUCTIONS
Start taking either Zyrtec or Allegra. Take once daily for at least 1 month  Restart diet  Follow up in 3 months for recheck of blood sugars  Can try using a humidifier, buy humidystat

## 2024-02-01 NOTE — PROGRESS NOTES
Chief Complaint   Patient presents with    Diabetes     3 months / labs completed    Hypertension     Gabino White is a 68-year-old male who presents for general follow-up.  Patient follows with Dr. Cristina Lopez as his primary care physician.  Patient was last seen at the internal medicine clinic on 12/7/2023 for annual Medicare wellness check at that time.  At that appointment diabetic foot exam was done.  Patient did recently see his optometrist and had an eye exam and reports no concerns at that time.  Patient's hemoglobin A1c was rechecked and was found to be elevated at 7.4.  Patient states he has continued to take his Trulicity and has been exercising but has not increased in caloric intake and diet is worsened.  Patient admits to eating junk food regularly.  Patient has been checking his blood sugars in the morning which he states are range around 120.  Patient denies fever, chills, shortness of breath, difficulty breathing, chest pain, abdominal pain nausea or vomiting.  Patient states he still having some cough and has been taking Flonase with minimal improvement.  Patient states his cough has been going on for the past 6 months.  Patient did discuss interest in low-dose CT scan for lung screening.  On addressing patient's smoking history he does not qualify due not having a 20 pack smoking history.  Patient has no other questions or concerns at this time.    Past Medical History:   Diagnosis Date    Benign essential tremor     History of Stage III Prostate Adenocarcinoma 03/21/2017    Prostate removed May 2017    Transient hypertension     Well controlled on no antihypertensives    Type 2 diabetes mellitus with hyperlipidemia (HCC)     Umbilical hernia without obstruction and without gangrene 05/02/2017     Past Surgical History:   Procedure Laterality Date    BICEPS TENDON REPAIR Right 2015    EYE SURGERY  07/31/2018      Left    HERNIA REPAIR  05/02/2017    [place]

## 2024-02-19 ENCOUNTER — TELEPHONE (OUTPATIENT)
Dept: INTERNAL MEDICINE CLINIC | Age: 69
End: 2024-02-19

## 2024-02-19 NOTE — TELEPHONE ENCOUNTER
----- Message from Cristina Dillon MD sent at 2/18/2024  8:40 PM EST -----  I see that he didn't get CXR done - is his cough better on anti-histamine OTC?  If cough not better suggest he get CXR done.

## 2024-02-22 NOTE — TELEPHONE ENCOUNTER
I was able to get ahold of pt today.  He states his cough is no better.   He will get his CXR done on Monday on 2/26/24

## 2024-02-26 ENCOUNTER — HOSPITAL ENCOUNTER (OUTPATIENT)
Dept: GENERAL RADIOLOGY | Age: 69
Discharge: HOME | End: 2024-02-26
Payer: MEDICARE

## 2024-02-26 ENCOUNTER — HOSPITAL ENCOUNTER (OUTPATIENT)
Age: 69
Discharge: HOME | End: 2024-02-26
Payer: MEDICARE

## 2024-02-26 DIAGNOSIS — R05.3 CHRONIC COUGH: ICD-10-CM

## 2024-02-26 PROCEDURE — 71046 X-RAY EXAM CHEST 2 VIEWS: CPT

## 2024-02-26 NOTE — TELEPHONE ENCOUNTER
Let him know CXR was unremarkable.  Ask him what anti-histamine he is taking and make sure he is taking it every day, I always suggest taking at night.  He must stay on it as takes up to 6 weeks to see full effect.  Call if cough not better after 6 weeks of daily anti-histamine.  Then may consider having him in sooner with Dr. Randolph to evaluate spirometry but his last PFT 2019 was normal at time of chronic cough, vs getting allergy testing.

## 2024-02-28 NOTE — TELEPHONE ENCOUNTER
I spoke with pt and his wife.  He states he is taking the antihistamine daily.   Allegra and Zyrtec were recommended.   She states he is taking the one that does not make you drowsy.  Wife thinks it is allegra.  I advised him sometimes do not see full effect until 6 weeks and should stay on it daily for that long and to report back in at the end of the 6 weeks to let us know how he is doing and we will go from there.   They both verbalize understanding.

## 2024-05-01 SDOH — ECONOMIC STABILITY: INCOME INSECURITY: HOW HARD IS IT FOR YOU TO PAY FOR THE VERY BASICS LIKE FOOD, HOUSING, MEDICAL CARE, AND HEATING?: NOT VERY HARD

## 2024-05-01 SDOH — ECONOMIC STABILITY: FOOD INSECURITY: WITHIN THE PAST 12 MONTHS, THE FOOD YOU BOUGHT JUST DIDN'T LAST AND YOU DIDN'T HAVE MONEY TO GET MORE.: NEVER TRUE

## 2024-05-01 SDOH — ECONOMIC STABILITY: FOOD INSECURITY: WITHIN THE PAST 12 MONTHS, YOU WORRIED THAT YOUR FOOD WOULD RUN OUT BEFORE YOU GOT MONEY TO BUY MORE.: NEVER TRUE

## 2024-05-02 ENCOUNTER — OFFICE VISIT (OUTPATIENT)
Dept: INTERNAL MEDICINE CLINIC | Age: 69
End: 2024-05-02
Payer: MEDICARE

## 2024-05-02 VITALS
HEART RATE: 76 BPM | DIASTOLIC BLOOD PRESSURE: 80 MMHG | SYSTOLIC BLOOD PRESSURE: 132 MMHG | TEMPERATURE: 98.5 F | WEIGHT: 239.8 LBS | BODY MASS INDEX: 34.33 KG/M2 | HEIGHT: 70 IN

## 2024-05-02 DIAGNOSIS — J30.9 ALLERGIC RHINITIS, UNSPECIFIED SEASONALITY, UNSPECIFIED TRIGGER: ICD-10-CM

## 2024-05-02 DIAGNOSIS — E78.5 TYPE 2 DIABETES MELLITUS WITH HYPERLIPIDEMIA (HCC): Primary | ICD-10-CM

## 2024-05-02 DIAGNOSIS — K08.89 TOOTH PAIN: ICD-10-CM

## 2024-05-02 DIAGNOSIS — E11.69 TYPE 2 DIABETES MELLITUS WITH HYPERLIPIDEMIA (HCC): Primary | ICD-10-CM

## 2024-05-02 DIAGNOSIS — E78.5 TYPE 2 DIABETES MELLITUS WITH HYPERLIPIDEMIA (HCC): ICD-10-CM

## 2024-05-02 DIAGNOSIS — E11.69 TYPE 2 DIABETES MELLITUS WITH HYPERLIPIDEMIA (HCC): ICD-10-CM

## 2024-05-02 LAB — HBA1C MFR BLD: 6.7 % (ref 4.3–5.7)

## 2024-05-02 PROCEDURE — 83036 HEMOGLOBIN GLYCOSYLATED A1C: CPT

## 2024-05-02 PROCEDURE — 3044F HG A1C LEVEL LT 7.0%: CPT

## 2024-05-02 PROCEDURE — 1123F ACP DISCUSS/DSCN MKR DOCD: CPT

## 2024-05-02 PROCEDURE — 99214 OFFICE O/P EST MOD 30 MIN: CPT

## 2024-05-02 RX ORDER — CHLORHEXIDINE GLUCONATE ORAL RINSE 1.2 MG/ML
15 SOLUTION DENTAL 2 TIMES DAILY
Qty: 900 ML | Refills: 2 | Status: SHIPPED | OUTPATIENT
Start: 2024-05-02 | End: 2024-06-01

## 2024-05-02 RX ORDER — AMOXICILLIN 500 MG/1
500 CAPSULE ORAL 3 TIMES DAILY
Qty: 20 CAPSULE | Refills: 0 | Status: SHIPPED | OUTPATIENT
Start: 2024-05-02 | End: 2024-05-09

## 2024-05-02 RX ORDER — DULAGLUTIDE 4.5 MG/.5ML
4.5 INJECTION, SOLUTION SUBCUTANEOUS WEEKLY
Qty: 12 ADJUSTABLE DOSE PRE-FILLED PEN SYRINGE | Refills: 4 | Status: SHIPPED | OUTPATIENT
Start: 2024-05-02 | End: 2024-05-06

## 2024-05-02 RX ORDER — FEXOFENADINE HCL 180 MG/1
180 TABLET ORAL DAILY
COMMUNITY

## 2024-05-02 SDOH — ECONOMIC STABILITY: FOOD INSECURITY: WITHIN THE PAST 12 MONTHS, YOU WORRIED THAT YOUR FOOD WOULD RUN OUT BEFORE YOU GOT MONEY TO BUY MORE.: NEVER TRUE

## 2024-05-02 SDOH — ECONOMIC STABILITY: INCOME INSECURITY: HOW HARD IS IT FOR YOU TO PAY FOR THE VERY BASICS LIKE FOOD, HOUSING, MEDICAL CARE, AND HEATING?: NOT VERY HARD

## 2024-05-02 SDOH — ECONOMIC STABILITY: FOOD INSECURITY: WITHIN THE PAST 12 MONTHS, THE FOOD YOU BOUGHT JUST DIDN'T LAST AND YOU DIDN'T HAVE MONEY TO GET MORE.: NEVER TRUE

## 2024-05-02 NOTE — PROGRESS NOTES
1955    Chief Complaint   Patient presents with    Diabetes     3 months     Hypertension    Chronic Cough      Gabino Slade is a 69 year old male presents for 3 months follow up. Follows with Dr. Cristina Dillon as her PCP, last seen on 2/2/24.  At last visit plan was to continue Trulicity with implementing exercise as well as improving patient's diet.  Did discuss with patient's hemoglobin A1c did not improve and plan was to add metformin to her regimen.  Continue to monitor patient's blood pressure has remained stable while not on medication.  Additionally patient states his chronic cough has improved after starting Allegra.  Patient had stopped taking Allegra for a period time but his wife informed him that his coughing recurred and that he started taking again.  Patient's only other complaint today is a tooth ache in the left upper molar.  Patient has been having intermittent pain and discomfort for the past several years.  He states that this is been ongoing that it comes and goes.  Patient states that he has some chronic discomfort and his pain worsens with eating and biting down.  Patient has been using hydroperoxide with some relief of pain.  Patient was seen and evaluated by the dentist in the past for this which showed no signs of infection, cavity or fractured tooth.  Patient is previously received amoxicillin for this states this improves her symptoms.  Patient denies any fever, chills, shortness of breath, difficulty breathing, Alvin pain nausea or vomiting.  No other complaints today.    Past Medical History:   Diagnosis Date    Benign essential tremor     History of Stage III Prostate Adenocarcinoma 03/21/2017    Prostate removed May 2017    Transient hypertension     Well controlled on no antihypertensives    Type 2 diabetes mellitus with hyperlipidemia (HCC)     Umbilical hernia without obstruction and without gangrene 05/02/2017     Past Surgical History:   Procedure Laterality Date

## 2024-05-03 RX ORDER — DULAGLUTIDE 4.5 MG/.5ML
INJECTION, SOLUTION SUBCUTANEOUS
Refills: 4 | OUTPATIENT
Start: 2024-05-03

## 2024-05-06 ENCOUNTER — TELEPHONE (OUTPATIENT)
Dept: INTERNAL MEDICINE CLINIC | Age: 69
End: 2024-05-06

## 2024-05-06 RX ORDER — DULAGLUTIDE 3 MG/.5ML
3 INJECTION, SOLUTION SUBCUTANEOUS WEEKLY
Qty: 4 ADJUSTABLE DOSE PRE-FILLED PEN SYRINGE | Refills: 5 | Status: SHIPPED | OUTPATIENT
Start: 2024-05-06

## 2024-05-06 NOTE — TELEPHONE ENCOUNTER
[facility] called stating Trulicity 4.5 mg is on national shortage. They need another dosage sent in. You can send a new Rx for alternative or you can call [phone] option 2 with reference # [id number].

## 2024-05-28 NOTE — TELEPHONE ENCOUNTER
Needs script to go to local pharmacy . Called script to [facility] , left prescription onformation in prescriber's voicemail .

## 2024-05-29 RX ORDER — DULAGLUTIDE 3 MG/.5ML
3 INJECTION, SOLUTION SUBCUTANEOUS WEEKLY
Qty: 4 ADJUSTABLE DOSE PRE-FILLED PEN SYRINGE | Refills: 5 | Status: SHIPPED | OUTPATIENT
Start: 2024-05-29

## 2024-10-01 DIAGNOSIS — E78.5 TYPE 2 DIABETES MELLITUS WITH HYPERLIPIDEMIA (HCC): Chronic | ICD-10-CM

## 2024-10-01 DIAGNOSIS — E11.69 TYPE 2 DIABETES MELLITUS WITH HYPERLIPIDEMIA (HCC): Chronic | ICD-10-CM

## 2024-10-01 NOTE — TELEPHONE ENCOUNTER
Trulicity 4.5 mg available per wife . Called script to [facility]  , left prescription information on prescriber's voicemail.

## 2024-10-02 RX ORDER — DULAGLUTIDE 4.5 MG/.5ML
4.5 INJECTION, SOLUTION SUBCUTANEOUS WEEKLY
Qty: 12 ADJUSTABLE DOSE PRE-FILLED PEN SYRINGE | Refills: 1 | OUTPATIENT
Start: 2024-10-02

## 2024-10-23 ENCOUNTER — TELEPHONE (OUTPATIENT)
Dept: INTERNAL MEDICINE CLINIC | Age: 69
End: 2024-10-23

## 2024-10-23 NOTE — TELEPHONE ENCOUNTER
Pt got bit by tick. Wife left voicemail that he is having flu like symptoms. I called and scheduled appt. They refused the earliest appt.

## 2024-10-23 NOTE — PROGRESS NOTES
1955    This is a 69 year old male, PMH of DM, presented after a tick bite in [place] on Wednesday. He was complaining of itching and tenderness of the right side of the chest and the wife found a tick on him and removed it on Friday. Patient has been feeling tired and generally unwell since then. Yesterday he developed fever with chills, says he feels light headed and confused. Denies any cough, flu like symptoms, vomiting, diarrhea or dysuria, joint pains.        Past Medical History:   Diagnosis Date    Benign essential tremor     History of Stage III Prostate Adenocarcinoma 03/21/2017    Prostate removed May 2017    Transient hypertension     Well controlled on no antihypertensives    Type 2 diabetes mellitus with hyperlipidemia (HCC)     Umbilical hernia without obstruction and without gangrene 05/02/2017       Past Surgical History:   Procedure Laterality Date    BICEPS TENDON REPAIR Right 2015    EYE SURGERY  07/31/2018      Left    HERNIA REPAIR  05/02/2017    [place], [place]    PROSTATECTOMY  05/2017    Dr Mcelroy--[facility]    TOTAL KNEE ARTHROPLASTY Bilateral 2011       Current Outpatient Medications   Medication Sig Dispense Refill    Dulaglutide (TRULICITY) 4.5 MG/0.5ML SOPN Inject 4.5 mg into the skin once a week 12 Adjustable Dose Pre-filled Pen Syringe 1    rosuvastatin (CRESTOR) 10 MG tablet TAKE 1 TABLET NIGHTLY 90 tablet 3    MULTIPLE VITAMIN PO Take by mouth daily      Lancets MISC Use to test blood sugar daily DX:E11.9 100 each 3    blood glucose monitor strips Test one time a day DX:E11.9 100 strip 3    aspirin EC 81 MG EC tablet Take 1 tablet by mouth daily      blood glucose monitor kit and supplies Dispense glucometer covered by patient's insurance . Test blood sugar daily DX:E11.9 1 kit 0     No current facility-administered medications for this visit.       Allergies   Allergen Reactions    Dye [Iodides] Other (See Comments)     Nausea, alternating hot

## 2024-10-24 ENCOUNTER — APPOINTMENT (OUTPATIENT)
Dept: CT IMAGING | Age: 69
DRG: 869 | End: 2024-10-24
Payer: MEDICARE

## 2024-10-24 ENCOUNTER — APPOINTMENT (OUTPATIENT)
Dept: GENERAL RADIOLOGY | Age: 69
DRG: 869 | End: 2024-10-24
Payer: MEDICARE

## 2024-10-24 ENCOUNTER — HOSPITAL ENCOUNTER (EMERGENCY)
Age: 69
Discharge: HOME | DRG: 869 | End: 2024-10-24
Attending: STUDENT IN AN ORGANIZED HEALTH CARE EDUCATION/TRAINING PROGRAM
Payer: MEDICARE

## 2024-10-24 ENCOUNTER — OFFICE VISIT (OUTPATIENT)
Dept: INTERNAL MEDICINE CLINIC | Age: 69
End: 2024-10-24

## 2024-10-24 VITALS
DIASTOLIC BLOOD PRESSURE: 75 MMHG | TEMPERATURE: 104 F | HEIGHT: 70 IN | SYSTOLIC BLOOD PRESSURE: 159 MMHG | HEART RATE: 100 BPM | BODY MASS INDEX: 34.88 KG/M2 | OXYGEN SATURATION: 92 % | WEIGHT: 243.6 LBS

## 2024-10-24 VITALS
OXYGEN SATURATION: 96 % | RESPIRATION RATE: 16 BRPM | BODY MASS INDEX: 34.79 KG/M2 | HEART RATE: 70 BPM | TEMPERATURE: 100 F | SYSTOLIC BLOOD PRESSURE: 122 MMHG | WEIGHT: 243 LBS | DIASTOLIC BLOOD PRESSURE: 70 MMHG | HEIGHT: 70 IN

## 2024-10-24 DIAGNOSIS — S20.361A TICK BITE OF RIGHT FRONT WALL OF THORAX, INITIAL ENCOUNTER: Primary | ICD-10-CM

## 2024-10-24 DIAGNOSIS — R50.81 FEVER IN OTHER DISEASES: Primary | ICD-10-CM
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
## 2024-10-24 DIAGNOSIS — R50.9 FEVER, UNSPECIFIED FEVER CAUSE: ICD-10-CM

## 2024-10-24 LAB
ALBUMIN SERPL BCG-MCNC: 4 G/DL (ref 3.5–5.1)
ALP SERPL-CCNC: 71 U/L (ref 38–126)
ALT SERPL W/O P-5'-P-CCNC: 85 U/L (ref 11–66)
ANION GAP SERPL CALC-SCNC: 15 MEQ/L (ref 8–16)
ANISOCYTOSIS BLD QL SMEAR: PRESENT
AST SERPL-CCNC: 93 U/L (ref 5–40)
BACTERIA URNS QL MICRO: ABNORMAL /HPF
BASOPHILS ABSOLUTE: 0 THOU/MM3 (ref 0–0.1)
BASOPHILS NFR BLD AUTO: 0.7 %
BILIRUB CONJ SERPL-MCNC: 0.4 MG/DL (ref 0.1–13.8)
BILIRUB SERPL-MCNC: 0.9 MG/DL (ref 0.3–1.2)
BILIRUB UR QL STRIP.AUTO: ABNORMAL
BUN SERPL-MCNC: 18 MG/DL (ref 7–22)
CALCIUM SERPL-MCNC: 8.5 MG/DL (ref 8.5–10.5)
CASTS #/AREA URNS LPF: ABNORMAL /LPF
CASTS 2: PRESENT /LPF
CHARACTER UR: ABNORMAL
CHLORIDE SERPL-SCNC: 100 MEQ/L (ref 98–111)
CO2 SERPL-SCNC: 19 MEQ/L (ref 23–33)
COLOR, UA: ABNORMAL
CREAT SERPL-MCNC: 1.2 MG/DL (ref 0.4–1.2)
CRYSTALS URNS MICRO: ABNORMAL
DEPRECATED RDW RBC AUTO: 41.4 FL (ref 35–45)
EKG ATRIAL RATE: 94 BPM
EKG P AXIS: 47 DEGREES
EKG P-R INTERVAL: 170 MS
EKG Q-T INTERVAL: 318 MS
EKG QRS DURATION: 88 MS
EKG QTC CALCULATION (BAZETT): 397 MS
EKG R AXIS: -11 DEGREES
EKG T AXIS: 68 DEGREES
EKG VENTRICULAR RATE: 94 BPM
EOSINOPHIL NFR BLD AUTO: 0 %
EOSINOPHILS ABSOLUTE: 0 THOU/MM3 (ref 0–0.4)
EPITHELIAL CELLS, UA: ABNORMAL /HPF
ERYTHROCYTE [DISTWIDTH] IN BLOOD BY AUTOMATED COUNT: 13.2 % (ref 11.5–14.5)
FLUAV RNA RESP QL NAA+PROBE: NOT DETECTED
FLUBV RNA RESP QL NAA+PROBE: NOT DETECTED
GFR SERPL CREATININE-BSD FRML MDRD: 65 ML/MIN/1.73M2
GLUCOSE SERPL-MCNC: 188 MG/DL (ref 70–108)
GLUCOSE UR QL STRIP.AUTO: 100 MG/DL
HCT VFR BLD AUTO: 43.6 % (ref 42–52)
HGB BLD-MCNC: 14.8 GM/DL (ref 14–18)
HGB UR QL STRIP.AUTO: NEGATIVE
IMM GRANULOCYTES # BLD AUTO: 0.03 THOU/MM3 (ref 0–0.07)
IMM GRANULOCYTES NFR BLD AUTO: 0.7 %
KETONES UR QL STRIP.AUTO: 15
LACTIC ACID, SEPSIS: 1.2 MMOL/L (ref 0.5–1.9)
LIPASE SERPL-CCNC: 105.4 U/L (ref 5.6–51.3)
LYMPHOCYTES ABSOLUTE: 0.3 THOU/MM3 (ref 1–4.8)
LYMPHOCYTES NFR BLD AUTO: 7.2 %
MAGNESIUM SERPL-MCNC: 1.7 MG/DL (ref 1.6–2.4)
MCH RBC QN AUTO: 29.5 PG (ref 26–33)
MCHC RBC AUTO-ENTMCNC: 33.9 GM/DL (ref 32.2–35.5)
MCV RBC AUTO: 86.9 FL (ref 80–94)
MISCELLANEOUS 2: ABNORMAL
MONOCYTES ABSOLUTE: 0.3 THOU/MM3 (ref 0.4–1.3)
MONOCYTES NFR BLD AUTO: 7.7 %
NEUTROPHILS ABSOLUTE: 3.5 THOU/MM3 (ref 1.8–7.7)
NEUTROPHILS NFR BLD AUTO: 83.7 %
NITRITE UR QL STRIP: NEGATIVE
NRBC BLD AUTO-RTO: 0 /100 WBC
OSMOLALITY SERPL CALC.SUM OF ELEC: 275.1 MOSMOL/KG (ref 275–300)
PH UR STRIP.AUTO: 5.5 [PH] (ref 5–9)
PLATELET # BLD AUTO: 120 THOU/MM3 (ref 130–400)
PMV BLD AUTO: 10.2 FL (ref 9.4–12.4)
POTASSIUM SERPL-SCNC: 4.1 MEQ/L (ref 3.5–5.2)
PROCALCITONIN SERPL IA-MCNC: 0.33 NG/ML (ref 0.01–0.09)
PROT SERPL-MCNC: 7 G/DL (ref 6.1–8)
PROT UR STRIP.AUTO-MCNC: 100 MG/DL
RBC # BLD AUTO: 5.02 MILL/MM3 (ref 4.7–6.1)
RBC URINE: ABNORMAL /HPF
RENAL EPI CELLS #/AREA URNS HPF: ABNORMAL /[HPF]
SARS-COV-2 RNA RESP QL NAA+PROBE: NOT DETECTED
SCAN OF BLOOD SMEAR: NORMAL
SODIUM SERPL-SCNC: 134 MEQ/L (ref 135–145)
SP GR UR REFRACT.AUTO: > 1.03 (ref 1–1.03)
TROPONIN, HIGH SENSITIVITY: 19 NG/L (ref 0–12)
TROPONIN, HIGH SENSITIVITY: 19 NG/L (ref 0–12)
UROBILINOGEN, URINE: 1 EU/DL (ref 0–1)
WBC # BLD AUTO: 4.2 THOU/MM3 (ref 4.8–10.8)
WBC #/AREA URNS HPF: ABNORMAL /HPF
WBC #/AREA URNS HPF: NEGATIVE /[HPF]
YEAST LIKE FUNGI URNS QL MICRO: ABNORMAL

## 2024-10-24 PROCEDURE — 84484 ASSAY OF TROPONIN QUANT: CPT

## 2024-10-24 PROCEDURE — 99285 EMERGENCY DEPT VISIT HI MDM: CPT

## 2024-10-24 PROCEDURE — 85025 COMPLETE CBC W/AUTO DIFF WBC: CPT

## 2024-10-24 PROCEDURE — 6370000000 HC RX 637 (ALT 250 FOR IP)

## 2024-10-24 PROCEDURE — 80053 COMPREHEN METABOLIC PANEL: CPT

## 2024-10-24 PROCEDURE — 87636 SARSCOV2 & INF A&B AMP PRB: CPT

## 2024-10-24 PROCEDURE — 87040 BLOOD CULTURE FOR BACTERIA: CPT

## 2024-10-24 PROCEDURE — 71045 X-RAY EXAM CHEST 1 VIEW: CPT

## 2024-10-24 PROCEDURE — 83605 ASSAY OF LACTIC ACID: CPT

## 2024-10-24 PROCEDURE — 70450 CT HEAD/BRAIN W/O DYE: CPT

## 2024-10-24 PROCEDURE — 86617 LYME DISEASE ANTIBODY: CPT

## 2024-10-24 PROCEDURE — 87086 URINE CULTURE/COLONY COUNT: CPT

## 2024-10-24 PROCEDURE — 83690 ASSAY OF LIPASE: CPT

## 2024-10-24 PROCEDURE — 87147 CULTURE TYPE IMMUNOLOGIC: CPT

## 2024-10-24 PROCEDURE — 87801 DETECT AGNT MULT DNA AMPLI: CPT

## 2024-10-24 PROCEDURE — 81001 URINALYSIS AUTO W/SCOPE: CPT

## 2024-10-24 PROCEDURE — 93005 ELECTROCARDIOGRAM TRACING: CPT

## 2024-10-24 PROCEDURE — 84145 PROCALCITONIN (PCT): CPT

## 2024-10-24 PROCEDURE — 36415 COLL VENOUS BLD VENIPUNCTURE: CPT

## 2024-10-24 PROCEDURE — 93010 ELECTROCARDIOGRAM REPORT: CPT | Performed by: NUCLEAR MEDICINE

## 2024-10-24 PROCEDURE — 82248 BILIRUBIN DIRECT: CPT

## 2024-10-24 PROCEDURE — 83735 ASSAY OF MAGNESIUM: CPT

## 2024-10-24 RX ORDER — ACETAMINOPHEN 500 MG
1000 TABLET ORAL
Status: COMPLETED | OUTPATIENT
Start: 2024-10-24 | End: 2024-10-24

## 2024-10-24 RX ORDER — DOXYCYCLINE HYCLATE 100 MG
100 TABLET ORAL 2 TIMES DAILY
Qty: 20 TABLET | Refills: 0 | Status: SHIPPED | OUTPATIENT
Start: 2024-10-24 | End: 2024-11-03

## 2024-10-24 RX ORDER — DOXYCYCLINE HYCLATE 100 MG
100 TABLET ORAL ONCE
Status: COMPLETED | OUTPATIENT
Start: 2024-10-24 | End: 2024-10-24

## 2024-10-24 RX ADMIN — ACETAMINOPHEN 1000 MG: 500 TABLET ORAL at 15:31

## 2024-10-24 RX ADMIN — DOXYCYCLINE HYCLATE 100 MG: 100 TABLET, COATED ORAL at 19:38

## 2024-10-24 ASSESSMENT — PAIN - FUNCTIONAL ASSESSMENT
PAIN_FUNCTIONAL_ASSESSMENT: NONE - DENIES PAIN

## 2024-10-24 NOTE — ED NOTES
Pt and family updated on POC. Pt is resting in cot with respirations even and unlabored. No distress noted. Call light is within reach.

## 2024-10-24 NOTE — ED PROVIDER NOTES
Transfer of Care Note:   Physician Signing out: Garrison Mosquera MD  Receiving Physician: Mendoza Schulte MD  Sign out time: 1600      Brief history:  Patient presented with fever since yesterday and noted at take prior to couple of days ago on his right chest.  Patient has been having nausea and dizziness.  No chest pain or shortness of breath.    Items pending that need to be checked:  CT head, Urinalysis and COVID and flue      Tentative Impression of patient:  Fever, r/o lyme disease    Expected disposition of patient:  Pending results, discharged.        Additional Assessment and results:   I have personally performed a face to face diagnostic evaluation on this patient. The patient's initial evaluation and plan have been discussed with the prior physician who initially evaluated the patient. Nursing Notes, Past Medical Hx, Past Surgical Hx, Social Hx, Allergies, vital signs and Family Hx were all reviewed.      Vitals:    10/24/24 1938   BP: 122/70   Pulse: 70   Resp: 16   Temp:    SpO2: 96%     Physical Exam      Labs Reviewed   BASIC METABOLIC PANEL - Abnormal; Notable for the following components:       Result Value    Sodium 134 (*)     CO2 19 (*)     Glucose 188 (*)     All other components within normal limits   CBC WITH AUTO DIFFERENTIAL - Abnormal; Notable for the following components:    WBC 4.2 (*)     Platelets 120 (*)     Lymphocytes Absolute 0.3 (*)     Monocytes Absolute 0.3 (*)     All other components within normal limits   HEPATIC FUNCTION PANEL - Abnormal; Notable for the following components:    AST 93 (*)     ALT 85 (*)     All other components within normal limits   LIPASE - Abnormal; Notable for the following components:    Lipase 105.4 (*)     All other components within normal limits   TROPONIN - Abnormal; Notable for the following components:    Troponin, High Sensitivity 19 (*)     All other components within normal limits   PROCALCITONIN - Abnormal; Notable for the following

## 2024-10-24 NOTE — DISCHARGE INSTRUCTIONS
Make sure to follow up with your PCP regarding your symptoms. You may alternate between Tylenol and Ibuprofen for your fever.    Take your antibiotics as prescribed.    You were noted to be dehydrated. Make sure to increase your fluid intake especially when you are having fever and sweating (actively losing fluids).    If you were to develop a rash at the tick bite site, make sure to call your PCP.    PLEASE RETURN TO THE EMERGENCY DEPARTMENT IMMEDIATELY for worsening symptoms, burn type appearance to your skin with skin coming off, white drainage from any of the wounds, redness with streaking, or if you develop any concerning symptoms such as: high fever not relieved by acetaminophen (Tylenol) and/or ibuprofen (Motrin / Advil), chills, shortness of breath, chest pain, feeling of your heart fluttering or racing, persistent nausea and/or vomiting, vomiting up blood, blood in your stool, loss of consciousness, numbness, weakness or tingling in the arms or legs or change in color of the extremities, changes in mental status, persistent headache, blurry vision, loss of bladder / bowel control, unable to follow up with your physician, or other any other care or concern.

## 2024-10-24 NOTE — ED PROVIDER NOTES
[facility] EMERGENCY DEPT  EMERGENCY DEPARTMENT ENCOUNTER          Pt Name: Gabino Slade  MRN: [id number]  Birthdate 1955  Date of evaluation: 10/24/2024  Physician: Garrison Mosquera MD  Supervising Attending Physician: Aleks Burgos DO       CHIEF COMPLAINT       Chief Complaint   Patient presents with    Fever    Dizziness         HISTORY OF PRESENT ILLNESS    HPI  Gabino Slade is a 69 y.o. male who presents to the emergency department from home, by private vehicle for evaluation of fever    Patient with history of diabetes treated prostate cancer presents to the ED for complaint of fever.  Started yesterday has been having fever of up to 104.  Associated with chills denied any cough dysuria or frequency or other urinary complaints.  Patient has had a tick bite to the right side of his thorax which she noticed on Saturday 5 days ago and was removed.  He also has been having some unsteadiness in walking and some dizziness.  The patient has no other acute complaints at this time.      REVIEW OF SYSTEMS   Review of Systems      PAST MEDICAL AND SURGICAL HISTORY     Past Medical History:   Diagnosis Date    Benign essential tremor     History of Stage III Prostate Adenocarcinoma 03/21/2017    Prostate removed May 2017    Transient hypertension     Well controlled on no antihypertensives    Type 2 diabetes mellitus with hyperlipidemia (HCC)     Umbilical hernia without obstruction and without gangrene 05/02/2017     Past Surgical History:   Procedure Laterality Date    BICEPS TENDON REPAIR Right 2015    EYE SURGERY  07/31/2018      Left    HERNIA REPAIR  05/02/2017    [place], [place]    PROSTATECTOMY  05/2017    Dr Mcelroy--[facility]    TOTAL KNEE ARTHROPLASTY Bilateral 2011         MEDICATIONS     Current Facility-Administered Medications:     acetaminophen (TYLENOL) tablet 1,000 mg, 1,000 mg, Oral, NOW, Garrison Mosquera MD    Current Outpatient Medications:

## 2024-10-24 NOTE — ED TRIAGE NOTES
Pt presents to the ED from home with complaints of having a fever. Pt states his fever started yesterday (10/23). Pt does have a oral temp of 103 on arrival. Pt states he felt a bump on his right breast last Wednesday (10/16), didn't think anything of it and on Saturday (10/19) him and his wife noticed that the bump was a tick. Other than the fever pt complains of feeling off balance when he walks. Pt denies any pain. EKG completed.

## 2024-10-24 NOTE — ED NOTES
Pt is resting in cot with respirations even and unlabored. Pt and family would like an update on POC. RN let Dr. Schulte know. Call light is within reach.

## 2024-10-24 NOTE — ED NOTES
Recent Tick bite. Developed fever, tachycardic, SPO2 in the high 80's. Concern that the tick was not removed entirely.

## 2024-10-25 ENCOUNTER — HOSPITAL ENCOUNTER (EMERGENCY)
Age: 69
Discharge: HOME | DRG: 869 | End: 2024-10-25
Attending: STUDENT IN AN ORGANIZED HEALTH CARE EDUCATION/TRAINING PROGRAM
Payer: MEDICARE

## 2024-10-25 VITALS
HEIGHT: 70 IN | OXYGEN SATURATION: 94 % | WEIGHT: 243 LBS | RESPIRATION RATE: 22 BRPM | DIASTOLIC BLOOD PRESSURE: 61 MMHG | TEMPERATURE: 98 F | HEART RATE: 70 BPM | SYSTOLIC BLOOD PRESSURE: 114 MMHG | BODY MASS INDEX: 34.79 KG/M2

## 2024-10-25 DIAGNOSIS — E78.5 TYPE 2 DIABETES MELLITUS WITH HYPERLIPIDEMIA (HCC): ICD-10-CM

## 2024-10-25 DIAGNOSIS — R79.9 CONTAMINATION OF BLOOD CULTURE: Primary | ICD-10-CM

## 2024-10-25 DIAGNOSIS — E11.69 TYPE 2 DIABETES MELLITUS WITH HYPERLIPIDEMIA (HCC): ICD-10-CM

## 2024-10-25 DIAGNOSIS — A69.20 LYME DISEASE: ICD-10-CM

## 2024-10-25 LAB
ACB COMPLEX DNA BLD POS QL NAA+NON-PROBE: NOT DETECTED
ALBUMIN SERPL BCG-MCNC: 3.9 G/DL (ref 3.5–5.1)
ALP SERPL-CCNC: 88 U/L (ref 38–126)
ALT SERPL W/O P-5'-P-CCNC: 96 U/L (ref 11–66)
ANION GAP SERPL CALC-SCNC: 16 MEQ/L (ref 8–16)
AST SERPL-CCNC: 96 U/L (ref 5–40)
B FRAGILIS DNA BLD POS QL NAA+NON-PROBE: NOT DETECTED
BACTERIA URNS QL MICRO: ABNORMAL /HPF
BASOPHILS ABSOLUTE: 0 THOU/MM3 (ref 0–0.1)
BASOPHILS NFR BLD AUTO: 0.4 %
BILIRUB SERPL-MCNC: 0.8 MG/DL (ref 0.3–1.2)
BILIRUB UR QL STRIP.AUTO: NEGATIVE
BLACTX-M ISLT/SPM QL: ABNORMAL
BLAIMP ISLT/SPM QL: ABNORMAL
BLAKPC ISLT/SPM QL: ABNORMAL
BLAOXA-48-LIKE ISLT/SPM QL: ABNORMAL
BLAVIM ISLT/SPM QL: ABNORMAL
BOTTLE TYPE: ABNORMAL
BUN SERPL-MCNC: 17 MG/DL (ref 7–22)
C ALBICANS DNA BLD POS QL NAA+NON-PROBE: NOT DETECTED
C AURIS DNA BLD POS QL NAA+NON-PROBE: NOT DETECTED
C GATTII+NEOFOR DNA BLD POS QL NAA+N-PRB: NOT DETECTED
C GLABRATA DNA BLD POS QL NAA+NON-PROBE: NOT DETECTED
C KRUSEI DNA BLD POS QL NAA+NON-PROBE: NOT DETECTED
C PARAP DNA BLD POS QL NAA+NON-PROBE: NOT DETECTED
C TROPICLS DNA BLD POS QL NAA+NON-PROBE: NOT DETECTED
CALCIUM SERPL-MCNC: 8.4 MG/DL (ref 8.5–10.5)
CASTS #/AREA URNS LPF: ABNORMAL /LPF
CASTS 2: ABNORMAL /LPF
CHARACTER UR: CLEAR
CHLORIDE SERPL-SCNC: 98 MEQ/L (ref 98–111)
CO2 SERPL-SCNC: 22 MEQ/L (ref 23–33)
COAG NEG STAPH DNA BLD QL NAA+PROBE: DETECTED
COLISTIN RES MCR-1 ISLT/SPM QL: ABNORMAL
COLOR, UA: ABNORMAL
CREAT SERPL-MCNC: 1 MG/DL (ref 0.4–1.2)
CRYSTALS URNS MICRO: ABNORMAL
DEPRECATED RDW RBC AUTO: 41.1 FL (ref 35–45)
E CLOAC COMP DNA BLD POS NAA+NON-PROBE: NOT DETECTED
E COLI DNA BLD POS QL NAA+NON-PROBE: NOT DETECTED
E FAECALIS DNA BLD POS QL NAA+NON-PROBE: NOT DETECTED
E FAECIUM DNA BLD POS QL NAA+NON-PROBE: NOT DETECTED
ENTEROBACTERALES DNA BLD POS NAA+N-PRB: NOT DETECTED
EOSINOPHIL NFR BLD AUTO: 0.7 %
EOSINOPHILS ABSOLUTE: 0 THOU/MM3 (ref 0–0.4)
EPITHELIAL CELLS, UA: ABNORMAL /HPF
ERYTHROCYTE [DISTWIDTH] IN BLOOD BY AUTOMATED COUNT: 13.1 % (ref 11.5–14.5)
GFR SERPL CREATININE-BSD FRML MDRD: 81 ML/MIN/1.73M2
GLUCOSE SERPL-MCNC: 148 MG/DL (ref 70–108)
GLUCOSE UR QL STRIP.AUTO: NEGATIVE MG/DL
GP B STREP DNA SPEC QL NAA+PROBE: NOT DETECTED
GP B STREP DNA SPEC QL NAA+PROBE: NOT DETECTED
HAEM INFLU DNA BLD POS QL NAA+NON-PROBE: NOT DETECTED
HCT VFR BLD AUTO: 42.5 % (ref 42–52)
HGB BLD-MCNC: 14.3 GM/DL (ref 14–18)
HGB UR QL STRIP.AUTO: NEGATIVE
IMM GRANULOCYTES # BLD AUTO: 0.01 THOU/MM3 (ref 0–0.07)
IMM GRANULOCYTES NFR BLD AUTO: 0.4 %
K OXYTOCA DNA BLD POS QL NAA+NON-PROBE: NOT DETECTED
K OXYTOCA DNA BLD POS QL NAA+NON-PROBE: NOT DETECTED
KETONES UR QL STRIP.AUTO: 15
KLEBSIELLA SP DNA BLD POS QL NAA+NON-PRB: NOT DETECTED
L MONOCYTOG DNA BLD POS QL NAA+NON-PROBE: NOT DETECTED
LACTIC ACID, SEPSIS: 1 MMOL/L (ref 0.5–1.9)
LACTIC ACID, SEPSIS: 1.1 MMOL/L (ref 0.5–1.9)
LYMPHOCYTES ABSOLUTE: 0.5 THOU/MM3 (ref 1–4.8)
LYMPHOCYTES NFR BLD AUTO: 19 %
MCH RBC QN AUTO: 29 PG (ref 26–33)
MCHC RBC AUTO-ENTMCNC: 33.6 GM/DL (ref 32.2–35.5)
MCV RBC AUTO: 86.2 FL (ref 80–94)
MECA ISLT/SPM QL: NOT DETECTED
MECA+MECC+MREJ ISLT/SPM QL: ABNORMAL
MISCELLANEOUS 2: ABNORMAL
MONOCYTES ABSOLUTE: 0.3 THOU/MM3 (ref 0.4–1.3)
MONOCYTES NFR BLD AUTO: 12.1 %
N MEN DNA BLD POS QL NAA+NON-PROBE: NOT DETECTED
NDM: ABNORMAL
NEUTROPHILS ABSOLUTE: 1.8 THOU/MM3 (ref 1.8–7.7)
NEUTROPHILS NFR BLD AUTO: 67.4 %
NITRITE UR QL STRIP: NEGATIVE
NRBC BLD AUTO-RTO: 0 /100 WBC
OSMOLALITY SERPL CALC.SUM OF ELEC: 276.3 MOSMOL/KG (ref 275–300)
P AERUGINOSA DNA BLD POS NAA+NON-PROBE: NOT DETECTED
PH UR STRIP.AUTO: 5.5 [PH] (ref 5–9)
PLATELET # BLD AUTO: 89 THOU/MM3 (ref 130–400)
PMV BLD AUTO: 11.3 FL (ref 9.4–12.4)
POTASSIUM SERPL-SCNC: 4.1 MEQ/L (ref 3.5–5.2)
PROCALCITONIN SERPL IA-MCNC: 0.41 NG/ML (ref 0.01–0.09)
PROT SERPL-MCNC: 7 G/DL (ref 6.1–8)
PROT UR STRIP.AUTO-MCNC: 30 MG/DL
PROTEUS SPP: NOT DETECTED
RBC # BLD AUTO: 4.93 MILL/MM3 (ref 4.7–6.1)
RBC URINE: ABNORMAL /HPF
RENAL EPI CELLS #/AREA URNS HPF: ABNORMAL /[HPF]
S AUREUS DNA BLD POS QL NAA+NON-PROBE: NOT DETECTED
S EPIDERMIDIS DNA BLD POS QL NAA+NON-PRB: DETECTED
S LUGDUNENSIS DNA BLD POS QL NAA+NON-PRB: NOT DETECTED
S MALTOPHILIA DNA BLD POS QL NAA+NON-PRB: NOT DETECTED
S MARCESCENS DNA BLD POS NAA+NON-PROBE: NOT DETECTED
S PYO DNA THROAT QL NAA+PROBE: NOT DETECTED
SALMONELLA DNA BLD POS QL NAA+NON-PROBE: NOT DETECTED
SCAN OF BLOOD SMEAR: NORMAL
SODIUM SERPL-SCNC: 136 MEQ/L (ref 135–145)
SOURCE OF BLOOD CULTURE: ABNORMAL
SP GR UR REFRACT.AUTO: 1.02 (ref 1–1.03)
STREPTOCOCCUS DNA BLD QL NAA+PROBE: NOT DETECTED
UROBILINOGEN, URINE: 1 EU/DL (ref 0–1)
VANA+VANB ISLT/SPM QL: ABNORMAL
WBC # BLD AUTO: 2.7 THOU/MM3 (ref 4.8–10.8)
WBC #/AREA URNS HPF: ABNORMAL /HPF
WBC #/AREA URNS HPF: NEGATIVE /[HPF]
YEAST LIKE FUNGI URNS QL MICRO: ABNORMAL

## 2024-10-25 PROCEDURE — 85025 COMPLETE CBC W/AUTO DIFF WBC: CPT

## 2024-10-25 PROCEDURE — 80053 COMPREHEN METABOLIC PANEL: CPT

## 2024-10-25 PROCEDURE — 36415 COLL VENOUS BLD VENIPUNCTURE: CPT

## 2024-10-25 PROCEDURE — 81001 URINALYSIS AUTO W/SCOPE: CPT

## 2024-10-25 PROCEDURE — 87040 BLOOD CULTURE FOR BACTERIA: CPT

## 2024-10-25 PROCEDURE — 83605 ASSAY OF LACTIC ACID: CPT

## 2024-10-25 PROCEDURE — 84145 PROCALCITONIN (PCT): CPT

## 2024-10-25 ASSESSMENT — PAIN - FUNCTIONAL ASSESSMENT: PAIN_FUNCTIONAL_ASSESSMENT: NONE - DENIES PAIN

## 2024-10-25 NOTE — ED TRIAGE NOTES
Presents to ED with complaints of positive blood cultures. Pt reports he was seen in ED yesterday and had blood cultures drawn. He reports he got a call today stating to come back to ED due to abnormal results. Pt denies any pain.

## 2024-10-25 NOTE — ED PROVIDER NOTES
2011).    CURRENT MEDICATIONS   Gabino has a current medication list which includes the following long-term medication(s): rosuvastatin, multiple vitamin, lancets, blood glucose test strips, and blood glucose monitor kit and supplies.    ALLERGIES   Gabino is allergic to dye [iodides].    FAMILY HISTORY   Gabino family history includes Alzheimer's Disease in his mother; Cancer in his father; Other in his mother.    SOCIAL HISTORY   Gabino  reports that he quit smoking about 13 years ago. His smoking use included cigarettes. He started smoking about 49 years ago. He has a 12 pack-year smoking history. He has quit using smokeless tobacco. He reports current alcohol use. He reports that he does not use drugs.    PHYSICAL EXAM     ED Triage Vitals [10/25/24 1359]   BP Systolic BP Percentile Diastolic BP Percentile Temp Temp Source Pulse Respirations SpO2   (!) 131/105 -- -- 98 °F (36.7 °C) Oral 80 17 98 %      Height Weight - Scale         1.778 m (5' 10\") 110.2 kg (243 lb)             Initial vital signs and nursing assessment reviewed and normal. Body mass index is 34.87 kg/m².     Vitals:    10/25/24 1359 10/25/24 1501 10/25/24 1602   BP: (!) 131/105 125/69 114/61   Pulse: 80 67 70   Resp: 17 15 22   Temp: 98 °F (36.7 °C)     TempSrc: Oral     SpO2: 98% 95% 94%   Weight: 110.2 kg (243 lb)     Height: 1.778 m (5' 10\")           Physical Exam  Vitals and nursing note reviewed.   Constitutional:       General: He is not in acute distress.     Appearance: Normal appearance. He is normal weight. He is not ill-appearing, toxic-appearing or diaphoretic.   HENT:      Head: Normocephalic and atraumatic.      Nose: Nose normal.      Mouth/Throat:      Mouth: Mucous membranes are moist.      Pharynx: Oropharynx is clear.   Eyes:      Conjunctiva/sclera: Conjunctivae normal.   Cardiovascular:      Rate and Rhythm: Normal rate and regular rhythm.      Pulses: Normal pulses.      Heart sounds: Normal heart sounds.   Pulmonary:       No data to display    ED Course as of 10/25/24 2249   Fri Oct 25, 2024   1442 Call to Berenice Bryan - he will call back. [SC]      ED Course User Index  [SC] Brian Robledo MD       Procedures: (None if left blank)  Procedures:     Consultants:  None    Documentation:  N/A    MEDICATION CHANGES     Discharge Medication List as of 10/25/2024  4:50 PM          FINAL IMPRESSION     Final diagnoses:   Contamination of blood culture   Lyme disease       DISPOSITION   DISPOSITION Decision To Discharge 10/25/2024 04:48:35 PM           Results and plan discussed with patient at bedside. Patient is agreeable to plan.    Outpatient Follow-Up:  Cristina Dillon MD  [address] [place] [address], [address]  [place] [address]  [phone]    Go on 10/31/2024  as planned    [facility] EMERGENCY DEPT  [address] [place] [address]  [phone]  Go to   If symptoms worsen    This transcription was electronically signed. Parts of this transcriptions may have been dictated by use of voice recognition software and electronically transcribed. The transcription may contain errors not detected in proofreading.     Electronically Signed: Brian Robledo MD, 10/25/24, 10:49 PM       Brian Robledo MD  10/25/24 8711

## 2024-10-25 NOTE — DISCHARGE INSTRUCTIONS
You were seen today for a positive blood culture from yesterday I think this is likely contamination and not related to your current disease process which is likely Lyme disease.  Continue on the doxycycline that you already have at home.    Follow-up with your primary care nurse physician as planned next week.    If symptoms are worse or other new concerns please come back to the Emergency Department for re-evaluation.    See attached if you would like to submit the tech to [facility]

## 2024-10-26 ENCOUNTER — HOSPITAL ENCOUNTER (INPATIENT)
Age: 69
LOS: 1 days | Discharge: HOME | DRG: 869 | End: 2024-10-27
Attending: STUDENT IN AN ORGANIZED HEALTH CARE EDUCATION/TRAINING PROGRAM | Admitting: INTERNAL MEDICINE
Payer: MEDICARE

## 2024-10-26 DIAGNOSIS — R78.81 POSITIVE BLOOD CULTURE: Primary | ICD-10-CM

## 2024-10-26 LAB
ALBUMIN SERPL BCG-MCNC: 3.5 G/DL (ref 3.5–5.1)
ALP SERPL-CCNC: 87 U/L (ref 38–126)
ALT SERPL W/O P-5'-P-CCNC: 76 U/L (ref 11–66)
ANION GAP SERPL CALC-SCNC: 15 MEQ/L (ref 8–16)
ANISOCYTOSIS BLD QL SMEAR: PRESENT
AST SERPL-CCNC: 66 U/L (ref 5–40)
BACTERIA BLD AEROBE CULT: ABNORMAL
BACTERIA UR CULT: ABNORMAL
BASOPHILS ABSOLUTE: 0 THOU/MM3 (ref 0–0.1)
BASOPHILS NFR BLD AUTO: 0.8 %
BILIRUB SERPL-MCNC: 0.5 MG/DL (ref 0.3–1.2)
BUN SERPL-MCNC: 14 MG/DL (ref 7–22)
CALCIUM SERPL-MCNC: 8.2 MG/DL (ref 8.5–10.5)
CHLORIDE SERPL-SCNC: 100 MEQ/L (ref 98–111)
CO2 SERPL-SCNC: 23 MEQ/L (ref 23–33)
CREAT SERPL-MCNC: 0.9 MG/DL (ref 0.4–1.2)
DEPRECATED RDW RBC AUTO: 40.7 FL (ref 35–45)
EOSINOPHIL NFR BLD AUTO: 3.1 %
EOSINOPHILS ABSOLUTE: 0.1 THOU/MM3 (ref 0–0.4)
ERYTHROCYTE [DISTWIDTH] IN BLOOD BY AUTOMATED COUNT: 13.1 % (ref 11.5–14.5)
GFR SERPL CREATININE-BSD FRML MDRD: > 90 ML/MIN/1.73M2
GLUCOSE BLD STRIP.AUTO-MCNC: 145 MG/DL (ref 70–108)
GLUCOSE BLD STRIP.AUTO-MCNC: 196 MG/DL (ref 70–108)
GLUCOSE SERPL-MCNC: 223 MG/DL (ref 70–108)
HCT VFR BLD AUTO: 37.9 % (ref 42–52)
HGB BLD-MCNC: 13.1 GM/DL (ref 14–18)
IMM GRANULOCYTES # BLD AUTO: 0.01 THOU/MM3 (ref 0–0.07)
IMM GRANULOCYTES NFR BLD AUTO: 0.4 %
LACTIC ACID, SEPSIS: 1.3 MMOL/L (ref 0.5–1.9)
LACTIC ACID, SEPSIS: 1.6 MMOL/L (ref 0.5–1.9)
LYMPHOCYTES ABSOLUTE: 1 THOU/MM3 (ref 1–4.8)
LYMPHOCYTES NFR BLD AUTO: 38.2 %
MCH RBC QN AUTO: 29.4 PG (ref 26–33)
MCHC RBC AUTO-ENTMCNC: 34.6 GM/DL (ref 32.2–35.5)
MCV RBC AUTO: 85 FL (ref 80–94)
MONOCYTES ABSOLUTE: 0.4 THOU/MM3 (ref 0.4–1.3)
MONOCYTES NFR BLD AUTO: 16 %
NEUTROPHILS ABSOLUTE: 1.1 THOU/MM3 (ref 1.8–7.7)
NEUTROPHILS NFR BLD AUTO: 41.5 %
NRBC BLD AUTO-RTO: 0 /100 WBC
ORGANISM: ABNORMAL
ORGANISM: ABNORMAL
OSMOLALITY SERPL CALC.SUM OF ELEC: 283.1 MOSMOL/KG (ref 275–300)
PLATELET # BLD AUTO: 89 THOU/MM3 (ref 130–400)
PLATELET BLD QL SMEAR: ABNORMAL
PMV BLD AUTO: 11.4 FL (ref 9.4–12.4)
POTASSIUM SERPL-SCNC: 3.9 MEQ/L (ref 3.5–5.2)
PROCALCITONIN SERPL IA-MCNC: 0.26 NG/ML (ref 0.01–0.09)
PROT SERPL-MCNC: 6.4 G/DL (ref 6.1–8)
RBC # BLD AUTO: 4.46 MILL/MM3 (ref 4.7–6.1)
SCAN OF BLOOD SMEAR: NORMAL
SODIUM SERPL-SCNC: 138 MEQ/L (ref 135–145)
VARIANT LYMPHS BLD QL SMEAR: ABNORMAL %
WBC # BLD AUTO: 2.6 THOU/MM3 (ref 4.8–10.8)

## 2024-10-26 PROCEDURE — 83605 ASSAY OF LACTIC ACID: CPT

## 2024-10-26 PROCEDURE — 6360000002 HC RX W HCPCS: Performed by: INTERNAL MEDICINE

## 2024-10-26 PROCEDURE — 2580000003 HC RX 258: Performed by: INTERNAL MEDICINE

## 2024-10-26 PROCEDURE — 80053 COMPREHEN METABOLIC PANEL: CPT

## 2024-10-26 PROCEDURE — 99223 1ST HOSP IP/OBS HIGH 75: CPT | Performed by: INTERNAL MEDICINE

## 2024-10-26 PROCEDURE — 85025 COMPLETE CBC W/AUTO DIFF WBC: CPT

## 2024-10-26 PROCEDURE — 1200000000 HC SEMI PRIVATE

## 2024-10-26 PROCEDURE — 84145 PROCALCITONIN (PCT): CPT

## 2024-10-26 PROCEDURE — 99285 EMERGENCY DEPT VISIT HI MDM: CPT

## 2024-10-26 PROCEDURE — 6370000000 HC RX 637 (ALT 250 FOR IP): Performed by: INTERNAL MEDICINE

## 2024-10-26 PROCEDURE — 36415 COLL VENOUS BLD VENIPUNCTURE: CPT

## 2024-10-26 PROCEDURE — 82948 REAGENT STRIP/BLOOD GLUCOSE: CPT

## 2024-10-26 RX ORDER — DOXYCYCLINE HYCLATE 100 MG
100 TABLET ORAL EVERY 12 HOURS SCHEDULED
Status: DISCONTINUED | OUTPATIENT
Start: 2024-10-26 | End: 2024-10-27 | Stop reason: HOSPADM

## 2024-10-26 RX ORDER — ASPIRIN 81 MG/1
81 TABLET ORAL DAILY
Status: DISCONTINUED | OUTPATIENT
Start: 2024-10-26 | End: 2024-10-27 | Stop reason: HOSPADM

## 2024-10-26 RX ORDER — GLUCAGON 1 MG/ML
1 KIT INJECTION PRN
Status: DISCONTINUED | OUTPATIENT
Start: 2024-10-26 | End: 2024-10-27 | Stop reason: HOSPADM

## 2024-10-26 RX ORDER — ONDANSETRON 2 MG/ML
4 INJECTION INTRAMUSCULAR; INTRAVENOUS EVERY 6 HOURS PRN
Status: DISCONTINUED | OUTPATIENT
Start: 2024-10-26 | End: 2024-10-27 | Stop reason: HOSPADM

## 2024-10-26 RX ORDER — ONDANSETRON 4 MG/1
4 TABLET, ORALLY DISINTEGRATING ORAL EVERY 8 HOURS PRN
Status: DISCONTINUED | OUTPATIENT
Start: 2024-10-26 | End: 2024-10-27 | Stop reason: HOSPADM

## 2024-10-26 RX ORDER — ACETAMINOPHEN 650 MG/1
650 SUPPOSITORY RECTAL EVERY 6 HOURS PRN
Status: DISCONTINUED | OUTPATIENT
Start: 2024-10-26 | End: 2024-10-27 | Stop reason: HOSPADM

## 2024-10-26 RX ORDER — POLYETHYLENE GLYCOL 3350 17 G/17G
17 POWDER, FOR SOLUTION ORAL DAILY PRN
Status: DISCONTINUED | OUTPATIENT
Start: 2024-10-26 | End: 2024-10-27 | Stop reason: HOSPADM

## 2024-10-26 RX ORDER — ACETAMINOPHEN 325 MG/1
650 TABLET ORAL EVERY 6 HOURS PRN
Status: DISCONTINUED | OUTPATIENT
Start: 2024-10-26 | End: 2024-10-27 | Stop reason: HOSPADM

## 2024-10-26 RX ORDER — SODIUM CHLORIDE 9 MG/ML
INJECTION, SOLUTION INTRAVENOUS PRN
Status: DISCONTINUED | OUTPATIENT
Start: 2024-10-26 | End: 2024-10-27 | Stop reason: HOSPADM

## 2024-10-26 RX ORDER — MAGNESIUM SULFATE IN WATER 40 MG/ML
2000 INJECTION, SOLUTION INTRAVENOUS PRN
Status: DISCONTINUED | OUTPATIENT
Start: 2024-10-26 | End: 2024-10-27 | Stop reason: HOSPADM

## 2024-10-26 RX ORDER — POTASSIUM CHLORIDE 1500 MG/1
20 TABLET, EXTENDED RELEASE ORAL PRN
Status: DISCONTINUED | OUTPATIENT
Start: 2024-10-26 | End: 2024-10-27 | Stop reason: HOSPADM

## 2024-10-26 RX ORDER — SODIUM CHLORIDE 9 MG/ML
INJECTION, SOLUTION INTRAVENOUS CONTINUOUS
Status: DISCONTINUED | OUTPATIENT
Start: 2024-10-26 | End: 2024-10-27 | Stop reason: HOSPADM

## 2024-10-26 RX ORDER — SODIUM CHLORIDE 0.9 % (FLUSH) 0.9 %
10 SYRINGE (ML) INJECTION PRN
Status: DISCONTINUED | OUTPATIENT
Start: 2024-10-26 | End: 2024-10-27 | Stop reason: HOSPADM

## 2024-10-26 RX ORDER — DEXTROSE MONOHYDRATE 100 MG/ML
INJECTION, SOLUTION INTRAVENOUS CONTINUOUS PRN
Status: DISCONTINUED | OUTPATIENT
Start: 2024-10-26 | End: 2024-10-27 | Stop reason: HOSPADM

## 2024-10-26 RX ORDER — POTASSIUM CHLORIDE 1500 MG/1
40 TABLET, EXTENDED RELEASE ORAL PRN
Status: DISCONTINUED | OUTPATIENT
Start: 2024-10-26 | End: 2024-10-27 | Stop reason: HOSPADM

## 2024-10-26 RX ORDER — SODIUM CHLORIDE 0.9 % (FLUSH) 0.9 %
10 SYRINGE (ML) INJECTION EVERY 12 HOURS SCHEDULED
Status: DISCONTINUED | OUTPATIENT
Start: 2024-10-26 | End: 2024-10-27 | Stop reason: HOSPADM

## 2024-10-26 RX ORDER — HYDRALAZINE HYDROCHLORIDE 20 MG/ML
10 INJECTION INTRAMUSCULAR; INTRAVENOUS EVERY 6 HOURS PRN
Status: DISCONTINUED | OUTPATIENT
Start: 2024-10-26 | End: 2024-10-27 | Stop reason: HOSPADM

## 2024-10-26 RX ORDER — ROSUVASTATIN CALCIUM 10 MG/1
10 TABLET, COATED ORAL NIGHTLY
Status: DISCONTINUED | OUTPATIENT
Start: 2024-10-26 | End: 2024-10-26 | Stop reason: SDUPTHER

## 2024-10-26 RX ORDER — INSULIN LISPRO 100 [IU]/ML
0-4 INJECTION, SOLUTION INTRAVENOUS; SUBCUTANEOUS
Status: DISCONTINUED | OUTPATIENT
Start: 2024-10-26 | End: 2024-10-27 | Stop reason: HOSPADM

## 2024-10-26 RX ORDER — ROSUVASTATIN CALCIUM 10 MG/1
10 TABLET, COATED ORAL DAILY
Status: DISCONTINUED | OUTPATIENT
Start: 2024-10-27 | End: 2024-10-27 | Stop reason: HOSPADM

## 2024-10-26 RX ORDER — ENOXAPARIN SODIUM 100 MG/ML
30 INJECTION SUBCUTANEOUS 2 TIMES DAILY
Status: DISCONTINUED | OUTPATIENT
Start: 2024-10-26 | End: 2024-10-27 | Stop reason: HOSPADM

## 2024-10-26 RX ORDER — POTASSIUM CHLORIDE 7.45 MG/ML
10 INJECTION INTRAVENOUS PRN
Status: DISCONTINUED | OUTPATIENT
Start: 2024-10-26 | End: 2024-10-26 | Stop reason: RX

## 2024-10-26 RX ADMIN — CEFTRIAXONE SODIUM 2000 MG: 2 INJECTION, POWDER, FOR SOLUTION INTRAMUSCULAR; INTRAVENOUS at 17:19

## 2024-10-26 RX ADMIN — SODIUM CHLORIDE: 9 INJECTION, SOLUTION INTRAVENOUS at 16:39

## 2024-10-26 RX ADMIN — Medication 2000 MG: at 17:58

## 2024-10-26 RX ADMIN — DOXYCYCLINE HYCLATE 100 MG: 100 TABLET, COATED ORAL at 20:58

## 2024-10-26 ASSESSMENT — PAIN - FUNCTIONAL ASSESSMENT
PAIN_FUNCTIONAL_ASSESSMENT: NONE - DENIES PAIN
PAIN_FUNCTIONAL_ASSESSMENT: NONE - DENIES PAIN

## 2024-10-26 NOTE — ED PROVIDER NOTES
cultures that may have been sent were not resulted at the time of this patient visit. A negative COVID-19 test should be interpreted as COVID no longer suspected unless otherwise noted in this encounter documentation note)    MEDICAL DECISION MAKING   Initial Differential: Includes but is not limited to bacteremia, Lyme disease, contamination    Discrepancies:  None    Summary: This is a 69 y.o. old male here for a third emergency room visit in the last 3 days with another positive blood culture result, this is much less likely to be contamination given that it gram positive bacilli.  He otherwise appears well though this is likely a real bacteremia.  He is on doxycycline for Lyme disease though this is not enough to cover a gram-positive bacilli, therefore also started on a dose of ceftriaxone.  Given that he has 2 sets of blood cultures in the last couple of days have not ordering a third today.  An attempt to reach out to infectious disease but I do not think they are on on the weekend.      Hospitalist service was consulted for consideration of admission.    Medical Decision Making  Problems Addressed:  Positive blood culture: undiagnosed new problem with uncertain prognosis    Amount and/or Complexity of Data Reviewed  External Data Reviewed: notes.  Labs: ordered. Decision-making details documented in ED Course.    Risk  Decision regarding hospitalization.                    ED COURSE   ED Medications administered this visit (None if left blank):   Medications   cefTRIAXone (ROCEPHIN) 2,000 mg in sterile water 20 mL IV syringe (has no administration in time range)       ED Course as of 10/26/24 1503   Sat Oct 26, 2024   1330 Call to Lab -  [SC]   1334 Call to Dr. Deutsch - ID -  [SC]   1350 Headache, behind eyes. In morning when get up.  [SC]   1359 Call to Dr. Deutsch [SC]      ED Course User Index  [SC] Brian Robledo MD       Procedures: (None if left blank)  Procedures:

## 2024-10-26 NOTE — ED NOTES
Bedside report received from Oscar BAUM. Pt resting in ED cot watching tv. Remains alert and oriented.

## 2024-10-26 NOTE — ED TRIAGE NOTES
Pt to ED from home with c/o abnormal lab. Pt states he had a tick bite and has been getting labs drawn. States he was told to come in to see if they need to switch his medications based off of his recent lab draw from yesterday.

## 2024-10-26 NOTE — H&P
days after removing tick bite from chest. Since starting Doxy fevers have resolved. Cont Doxy    # Type 2 DM: cont Trulicity, Humalog sliding scale. Diabetic Diet. Accu-checks.     # HLD: resume statin       Thank you Cristina Dillon MD for the opportunity to be involved in this patient's care.    Electronically signed by Efraín Bull MD on 10/26/2024 at 3:37 PM

## 2024-10-26 NOTE — ED NOTES
ED nurse-to-nurse bedside report    Chief Complaint   Patient presents with    Abnormal Lab     Not sure which lab      LOC: alert and orientated to name, place, date  Vital signs   Vitals:    10/26/24 1254 10/26/24 1443   BP: 128/66 (!) 144/86   Pulse: 69 82   Resp: 19 19   Temp: 97.6 °F (36.4 °C)    TempSrc: Oral    SpO2: 96% 96%   Weight: 110.2 kg (243 lb)    Height: 1.778 m (5' 10\")       Pain:  0/10  Pain Interventions: NA  Pain Goal:   Oxygen: No    Current needs required RA   Telemetry: No  LDAs:   Peripheral IV 10/26/24 Left Forearm (Active)   Site Assessment Clean, dry & intact 10/26/24 1442   Line Status Blood return noted;Normal saline locked;Flushed 10/26/24 1442   Phlebitis Assessment No symptoms 10/26/24 1442   Infiltration Assessment 0 10/26/24 1442   Dressing Status Clean, dry & intact;New dressing applied 10/26/24 1442   Dressing Type Transparent 10/26/24 1442   Dressing Intervention New 10/26/24 1442     Continuous Infusions:   Mobility: Independent  Cabrera Fall Risk Score:       12/7/2023     1:21 PM 11/2/2023     1:26 PM 9/27/2022     7:02 AM 9/23/2021     1:55 PM 7/22/2021     1:08 PM 7/16/2020     2:58 PM   Fall Risk   Do you feel unsteady or are you worried about falling?  no no no      2 or more falls in past year? no no no no no no   Fall with injury in past year? no no no no no no     Fall Interventions: Side rails up x2, Call light in reach, Hourly rounding, Family bedside  Report given to: SARIKA Jacinto

## 2024-10-26 NOTE — PROGRESS NOTES
[facility]   Pharmacy Pharmacokinetic Monitoring Service - Vancomycin     Gabino Slade is a 69 y.o. male starting on vancomycin therapy for bacteremia. Pharmacy consulted by Dr. Bull for monitoring and adjustment.    Target Concentration: Goal AUC/-600 mg*hr/L    Additional Antimicrobials: Rocephin, doxycycline    Pertinent Laboratory Values:   Wt Readings from Last 1 Encounters:   10/26/24 110.2 kg (243 lb)     Temp Readings from Last 1 Encounters:   10/26/24 99.1 °F (37.3 °C) (Oral)     Estimated Creatinine Clearance: 96 mL/min (based on SCr of 0.9 mg/dL).  Recent Labs     10/25/24  1403 10/26/24  1353   CREATININE 1.0 0.9   BUN 17 14   WBC 2.7* 2.6*     Procalcitonin: 0.26 (10/26)    Pertinent Cultures:  Culture Date Source Results   10/24 BCx1 Coag negative staph   MRSA Nasal Swab: N/A. Non-respiratory infection.    Plan:  Dosing recommendations based on Bayesian software  Start vancomycin 2000 mg x1 followed by 1250 mg q12h  Anticipated AUC of 544 and trough concentration of 16.4 at steady state  Renal labs as indicated   Vancomycin concentration: not ordered  Pharmacy will continue to monitor patient and adjust therapy as indicated  Suggest discontinuing doxycycline     Thank you for the consult,  Cecil Hayes RPH  10/26/2024 4:29 PM

## 2024-10-26 NOTE — ED NOTES
ED to inpatient nurses report      Chief Complaint:  Chief Complaint   Patient presents with    Abnormal Lab     Not sure which lab     Present to ED from: home    MOA:     LOC: alert and orientated to name, place, date  Mobility: Independent  Oxygen Baseline: RA    Current needs required: RA     Code Status:   Prior    What abnormal results were found and what did you give/do to treat them?   Any procedures or intervention occur?     Mental Status:  Level of Consciousness: Alert (0)    Psych Assessment:        Vitals:  Patient Vitals for the past 24 hrs:   BP Temp Temp src Pulse Resp SpO2 Height Weight   10/26/24 1443 (!) 144/86 -- -- 82 19 96 % -- --   10/26/24 1254 128/66 97.6 °F (36.4 °C) Oral 69 19 96 % 1.778 m (5' 10\") 110.2 kg (243 lb)        LDAs:   Peripheral IV 10/26/24 Left Forearm (Active)   Site Assessment Clean, dry & intact 10/26/24 1442   Line Status Blood return noted;Normal saline locked;Flushed 10/26/24 1442   Phlebitis Assessment No symptoms 10/26/24 1442   Infiltration Assessment 0 10/26/24 1442   Dressing Status Clean, dry & intact;New dressing applied 10/26/24 1442   Dressing Type Transparent 10/26/24 1442   Dressing Intervention New 10/26/24 1442       Ambulatory Status:  Presents to emergency department  because of falls (Syncope, seizure, or loss of consciousness): No, Age > 70: No, Altered Mental Status, Intoxication with alcohol or substance confusion (Disorientation, impaired judgment, poor safety awaremess, or inability to follow instructions): No, Impaired Mobility: Ambulates or transfers with assistive devices or assistance; Unable to ambulate or transer.: No, Nursing Judgement: No    Diagnosis:  DISPOSITION Decision To Admit 10/26/2024 02:58:12 PM   Final diagnoses:   Positive blood culture        Consults:  None     Pain Score:  Pain Assessment  Pain Assessment: None - Denies Pain    C-SSRS:   Risk of Suicide: No Risk    Sepsis Screening:       Didi Fall Risk:       Swallow

## 2024-10-27 VITALS
RESPIRATION RATE: 16 BRPM | TEMPERATURE: 98.9 F | WEIGHT: 243 LBS | DIASTOLIC BLOOD PRESSURE: 78 MMHG | BODY MASS INDEX: 34.79 KG/M2 | HEART RATE: 82 BPM | OXYGEN SATURATION: 97 % | SYSTOLIC BLOOD PRESSURE: 144 MMHG | HEIGHT: 70 IN

## 2024-10-27 LAB
ANION GAP SERPL CALC-SCNC: 13 MEQ/L (ref 8–16)
B BURGDOR IGG SER QL IB: NEGATIVE
B BURGDOR IGM SER QL IB: NEGATIVE
BACTERIA BLD AEROBE CULT: ABNORMAL
BACTERIA BLD AEROBE CULT: ABNORMAL
BACTERIA BLD AEROBE CULT: NORMAL
BACTERIA BLD AEROBE CULT: NORMAL
BASOPHILS ABSOLUTE: 0 THOU/MM3 (ref 0–0.1)
BASOPHILS NFR BLD AUTO: 0.6 %
BUN SERPL-MCNC: 11 MG/DL (ref 7–22)
CALCIUM SERPL-MCNC: 8 MG/DL (ref 8.5–10.5)
CHLORIDE SERPL-SCNC: 103 MEQ/L (ref 98–111)
CO2 SERPL-SCNC: 22 MEQ/L (ref 23–33)
CREAT SERPL-MCNC: 0.8 MG/DL (ref 0.4–1.2)
DEPRECATED RDW RBC AUTO: 40.7 FL (ref 35–45)
EOSINOPHIL NFR BLD AUTO: 2.7 %
EOSINOPHILS ABSOLUTE: 0.1 THOU/MM3 (ref 0–0.4)
ERYTHROCYTE [DISTWIDTH] IN BLOOD BY AUTOMATED COUNT: 13.1 % (ref 11.5–14.5)
GFR SERPL CREATININE-BSD FRML MDRD: > 90 ML/MIN/1.73M2
GLUCOSE BLD STRIP.AUTO-MCNC: 145 MG/DL (ref 70–108)
GLUCOSE SERPL-MCNC: 152 MG/DL (ref 70–108)
HCT VFR BLD AUTO: 37.5 % (ref 42–52)
HGB BLD-MCNC: 12.7 GM/DL (ref 14–18)
IMM GRANULOCYTES # BLD AUTO: 0.01 THOU/MM3 (ref 0–0.07)
IMM GRANULOCYTES NFR BLD AUTO: 0.3 %
LYMPHOCYTES ABSOLUTE: 1.3 THOU/MM3 (ref 1–4.8)
LYMPHOCYTES NFR BLD AUTO: 39.8 %
MAGNESIUM SERPL-MCNC: 1.7 MG/DL (ref 1.6–2.4)
MCH RBC QN AUTO: 29.3 PG (ref 26–33)
MCHC RBC AUTO-ENTMCNC: 33.9 GM/DL (ref 32.2–35.5)
MCV RBC AUTO: 86.4 FL (ref 80–94)
MONOCYTES ABSOLUTE: 0.4 THOU/MM3 (ref 0.4–1.3)
MONOCYTES NFR BLD AUTO: 12.9 %
NEUTROPHILS ABSOLUTE: 1.4 THOU/MM3 (ref 1.8–7.7)
NEUTROPHILS NFR BLD AUTO: 43.7 %
NRBC BLD AUTO-RTO: 0 /100 WBC
ORGANISM: ABNORMAL
PLATELET # BLD AUTO: 107 THOU/MM3 (ref 130–400)
PLATELET BLD QL SMEAR: ABNORMAL
PMV BLD AUTO: 11.3 FL (ref 9.4–12.4)
POTASSIUM SERPL-SCNC: 4 MEQ/L (ref 3.5–5.2)
RBC # BLD AUTO: 4.34 MILL/MM3 (ref 4.7–6.1)
SCAN OF BLOOD SMEAR: NORMAL
SODIUM SERPL-SCNC: 138 MEQ/L (ref 135–145)
VARIANT LYMPHS BLD QL SMEAR: ABNORMAL %
WBC # BLD AUTO: 3.3 THOU/MM3 (ref 4.8–10.8)

## 2024-10-27 PROCEDURE — 6370000000 HC RX 637 (ALT 250 FOR IP): Performed by: INTERNAL MEDICINE

## 2024-10-27 PROCEDURE — 99239 HOSP IP/OBS DSCHRG MGMT >30: CPT | Performed by: INTERNAL MEDICINE

## 2024-10-27 PROCEDURE — 2580000003 HC RX 258: Performed by: INTERNAL MEDICINE

## 2024-10-27 PROCEDURE — 80048 BASIC METABOLIC PNL TOTAL CA: CPT

## 2024-10-27 PROCEDURE — 83735 ASSAY OF MAGNESIUM: CPT

## 2024-10-27 PROCEDURE — 36415 COLL VENOUS BLD VENIPUNCTURE: CPT

## 2024-10-27 PROCEDURE — 85025 COMPLETE CBC W/AUTO DIFF WBC: CPT

## 2024-10-27 PROCEDURE — 6360000002 HC RX W HCPCS: Performed by: INTERNAL MEDICINE

## 2024-10-27 PROCEDURE — 82948 REAGENT STRIP/BLOOD GLUCOSE: CPT

## 2024-10-27 RX ADMIN — SODIUM CHLORIDE: 9 INJECTION, SOLUTION INTRAVENOUS at 02:48

## 2024-10-27 RX ADMIN — VANCOMYCIN HYDROCHLORIDE 1250 MG: 5 INJECTION, POWDER, LYOPHILIZED, FOR SOLUTION INTRAVENOUS at 06:18

## 2024-10-27 RX ADMIN — ACETAMINOPHEN 650 MG: 325 TABLET ORAL at 03:35

## 2024-10-27 NOTE — PLAN OF CARE
Problem: Chronic Conditions and Co-morbidities  Goal: Patient's chronic conditions and co-morbidity symptoms are monitored and maintained or improved  Outcome: Progressing  Flowsheets (Taken 10/26/2024 2048)  Care Plan - Patient's Chronic Conditions and Co-Morbidity Symptoms are Monitored and Maintained or Improved:   Monitor and assess patient's chronic conditions and comorbid symptoms for stability, deterioration, or improvement   Collaborate with multidisciplinary team to address chronic and comorbid conditions and prevent exacerbation or deterioration   Update acute care plan with appropriate goals if chronic or comorbid symptoms are exacerbated and prevent overall improvement and discharge     Problem: Discharge Planning  Goal: Discharge to home or other facility with appropriate resources  Outcome: Progressing  Flowsheets  Taken 10/26/2024 2048 by Elise Valdez RN  Discharge to home or other facility with appropriate resources:   Identify barriers to discharge with patient and caregiver   Arrange for needed discharge resources and transportation as appropriate   Identify discharge learning needs (meds, wound care, etc)   Refer to discharge planning if patient needs post-hospital services based on physician order or complex needs related to functional status, cognitive ability or social support system  Taken 10/26/2024 1627 by Xenia Odom RN  Discharge to home or other facility with appropriate resources:   Identify barriers to discharge with patient and caregiver   Identify discharge learning needs (meds, wound care, etc)   Arrange for needed discharge resources and transportation as appropriate   Arrange for interpreters to assist at discharge as needed     Problem: Infection - Adult  Goal: Absence of infection at discharge  Outcome: Progressing  Flowsheets (Taken 10/26/2024 2225)  Absence of infection at discharge:   Assess and monitor for signs and symptoms of infection   Monitor

## 2024-10-27 NOTE — PROGRESS NOTES
Patient discharged at this time. No new medication. Follow up with PCP in 3-5 days. IV removed and tele removed. Patient left in street clothes. Wife to pick patient up.

## 2024-10-27 NOTE — CONSULTS
CONSULTATION NOTE :ID       Patient - Gabino Slade,  Age - 69 y.o.    - 1955      Room Number - 6A-09/009-A   MRN -  [id number]   [facility] # - [id number]  Date of Admission -  10/26/2024 12:45 PM  Patient's PCP: Cristina Dillon MD     Requesting Physician: Efraín Bull MD    REASON FOR CONSULTATION   +ve blood cx  CHIEF COMPLAINT   Aching pain and fever    HISTORY OF PRESENT ILLNESS       This is a very pleasant 69 y.o. male who was admitted to the hospital after he was noted to have a positive blood culture. He was recently seen at the ED after he presented with aching pain and fever. He was in [place] last wk and he found a very smal tick around his right chest wall area and was removed easily. He reports it was very tiny and was not engorged with blood. He was discharged with oral doxycline and at the ED he had blood cx. One of the blood cx was positive for coag negative staph and was advised to come. A repeat was negative for the same bacteria but has a gram positive bacilli. Likely contaminant. He denies any fever or chills. He has hx of HTN diabetes and prostate cancer. No hx of lyme disease in the past, no skin rash     PAST MEDICAL  HISTORY       Past Medical History:   Diagnosis Date    Benign essential tremor     History of Stage III Prostate Adenocarcinoma 2017    Prostate removed May 2017    Transient hypertension     Well controlled on no antihypertensives    Type 2 diabetes mellitus with hyperlipidemia (HCC)     Umbilical hernia without obstruction and without gangrene 2017       PAST SURGICAL HISTORY     Past Surgical History:   Procedure Laterality Date    BICEPS TENDON REPAIR Right 2015    EYE SURGERY  2018      Left    HERNIA REPAIR  2017    [place]    PROSTATECTOMY  2017    Dr Mcelroy--[facility]    TOTAL KNEE ARTHROPLASTY Bilateral          MEDICATIONS:       Scheduled Meds:

## 2024-10-27 NOTE — DISCHARGE SUMMARY
Exam:     Vitals:  Vitals:    10/26/24 2049 10/26/24 2319 10/27/24 0350 10/27/24 0900   BP: 134/86 119/71 131/75 (!) 144/78   Pulse: 63 64 66 82   Resp: 16 14 16 16   Temp: 98.5 °F (36.9 °C) 98.2 °F (36.8 °C) 97.8 °F (36.6 °C) 98.9 °F (37.2 °C)   TempSrc: Oral Oral Oral Oral   SpO2: 100% 98% 96% 97%   Weight:       Height:         Weight: Weight - Scale: 110.2 kg (243 lb)     24 hour intake/output:  Intake/Output Summary (Last 24 hours) at 10/27/2024 1057  Last data filed at 10/27/2024 0816  Gross per 24 hour   Intake 340 ml   Output 1400 ml   Net -1060 ml         Labs: For convenience and continuity at follow-up the following most recent labs are provided:      CBC:    Lab Results   Component Value Date/Time    WBC 3.3 10/27/2024 06:04 AM    HGB 12.7 10/27/2024 06:04 AM    HCT 37.5 10/27/2024 06:04 AM     10/27/2024 06:04 AM       Renal:    Lab Results   Component Value Date/Time     10/27/2024 06:04 AM    K 4.0 10/27/2024 06:04 AM     10/27/2024 06:04 AM    CO2 22 10/27/2024 06:04 AM    BUN 11 10/27/2024 06:04 AM    CREATININE 0.8 10/27/2024 06:04 AM    CALCIUM 8.0 10/27/2024 06:04 AM         Significant Diagnostic Studies      Consults:     IP CONSULT TO PHARMACY  IP CONSULT TO INFECTIOUS DISEASES    Disposition:    [x] Home       [] TCU       [] Rehab       [] Psych       [] SNF       [] Long Term Care Facility       [] Other-    Condition at Discharge: Stable    Patient Instructions:    Discharge lab work: CBC   Activity: activity as tolerated  Diet: ADULT DIET; Regular; 3 carb choices (45 gm/meal)      Follow-up visits:   No follow-up provider specified.       Discharge Medications:        Medication List        CONTINUE taking these medications      aspirin EC 81 MG EC tablet     blood glucose monitor kit and supplies  Dispense glucometer covered by patient's insurance . Test blood sugar daily DX:E11.9     blood glucose test strips  Test one time a day DX:E11.9     doxycycline hyclate

## 2024-10-28 RX ORDER — ROSUVASTATIN CALCIUM 10 MG/1
TABLET, COATED ORAL
Qty: 90 TABLET | Refills: 3 | Status: SHIPPED | OUTPATIENT
Start: 2024-10-28

## 2024-10-30 LAB
BACTERIA BLD AEROBE CULT: NORMAL
BACTERIA BLD AEROBE CULT: NORMAL

## 2024-10-31 ENCOUNTER — OFFICE VISIT (OUTPATIENT)
Dept: INTERNAL MEDICINE CLINIC | Age: 69
End: 2024-10-31

## 2024-10-31 VITALS
SYSTOLIC BLOOD PRESSURE: 110 MMHG | DIASTOLIC BLOOD PRESSURE: 72 MMHG | HEIGHT: 70 IN | HEART RATE: 68 BPM | BODY MASS INDEX: 33.79 KG/M2 | WEIGHT: 236 LBS | TEMPERATURE: 98.4 F

## 2024-10-31 DIAGNOSIS — Z09 HOSPITAL DISCHARGE FOLLOW-UP: ICD-10-CM

## 2024-10-31 DIAGNOSIS — D61.818 PANCYTOPENIA (HCC): Primary | ICD-10-CM

## 2024-10-31 NOTE — PROGRESS NOTES
Post-Discharge Follow Up      Gabino Slade   YOB: 1955    Date of Office Visit:  10/31/2024  Date of Hospital Admission: 10/26/24  Date of Hospital Discharge: 10/27/24  Risk of hospital readmission (high >=14%. Medium >=10%) :Readmission Risk Score: 10.5    Chief Complaint   Patient presents with    Follow-Up from Hospital     D/c 10/27/24 - bacteremia     Insect Bite    Altered Mental Status     Care management risk score Rising risk (score 2-5) and Complex Care (Scores >=6): No Risk Score On File     Non face to face  following discharge, date last encounter closed (first attempt may have been earlier): *No documented post hospital discharge outreach found in the last 14 days    Call initiated 2 business days of discharge: *No response recorded in the last 14 days - unable to bill as transition of care    ASSESSMENT/PLAN:   Pancytopenia (HCC)  -     CBC with Auto Differential; Future  Hospital discharge follow-up  -     OR DISCHARGE MEDS RECONCILED W/ CURRENT OUTPATIENT MED LIST      Medical Decision Making: moderate complexity  Follow up in resident clinic 11/2024, lab due before that visit.         Subjective:   HPI:  Follow up of Hospital problems/diagnosis(es): Possible lyme disease, bacteremia - but likely contaminant.    Inpatient course: Discharge summary reviewed- see chart.    Interval history/Current status:   He is stable, no fever, mentation back to normal.  Eating well.  Recommend finishing doxycycline.  CBC in a month.  Has appt to follow up with resident clinic at end of 11/2024.    Patient Active Problem List   Diagnosis    Type 2 diabetes mellitus with hyperlipidemia (HCC)    Obesity (BMI 30-39.9)    History of Stage III Prostate Adenocarcinoma    Transient hypertension    Pure hypercholesterolemia    Bacteremia       Medications listed as ordered at the time of discharge from hospital     Medication List            Accurate as of October 31, 2024 11:59 PM. If you have any

## 2024-11-18 ENCOUNTER — LAB (OUTPATIENT)
Dept: LAB | Age: 69
End: 2024-11-18

## 2024-11-18 DIAGNOSIS — D61.818 PANCYTOPENIA (HCC): ICD-10-CM

## 2024-11-18 DIAGNOSIS — E78.5 TYPE 2 DIABETES MELLITUS WITH HYPERLIPIDEMIA (HCC): ICD-10-CM

## 2024-11-18 DIAGNOSIS — E11.69 TYPE 2 DIABETES MELLITUS WITH HYPERLIPIDEMIA (HCC): ICD-10-CM

## 2024-11-18 LAB
ANION GAP SERPL CALC-SCNC: 11 MEQ/L (ref 8–16)
BASOPHILS ABSOLUTE: 0 THOU/MM3 (ref 0–0.1)
BASOPHILS NFR BLD AUTO: 0.5 %
BUN SERPL-MCNC: 17 MG/DL (ref 7–22)
CALCIUM SERPL-MCNC: 9.7 MG/DL (ref 8.5–10.5)
CHLORIDE SERPL-SCNC: 106 MEQ/L (ref 98–111)
CHOLEST SERPL-MCNC: 122 MG/DL (ref 100–199)
CO2 SERPL-SCNC: 25 MEQ/L (ref 23–33)
CREAT SERPL-MCNC: 0.8 MG/DL (ref 0.4–1.2)
CREAT UR-MCNC: 279.1 MG/DL
DEPRECATED RDW RBC AUTO: 42.6 FL (ref 35–45)
EOSINOPHIL NFR BLD AUTO: 2.6 %
EOSINOPHILS ABSOLUTE: 0.2 THOU/MM3 (ref 0–0.4)
ERYTHROCYTE [DISTWIDTH] IN BLOOD BY AUTOMATED COUNT: 13.4 % (ref 11.5–14.5)
GFR SERPL CREATININE-BSD FRML MDRD: > 90 ML/MIN/1.73M2
GLUCOSE SERPL-MCNC: 161 MG/DL (ref 70–108)
HCT VFR BLD AUTO: 46.3 % (ref 42–52)
HDLC SERPL-MCNC: 49 MG/DL
HGB BLD-MCNC: 15.3 GM/DL (ref 14–18)
IMM GRANULOCYTES # BLD AUTO: 0.03 THOU/MM3 (ref 0–0.07)
IMM GRANULOCYTES NFR BLD AUTO: 0.4 %
LDLC SERPL CALC-MCNC: 56 MG/DL
LYMPHOCYTES ABSOLUTE: 2.5 THOU/MM3 (ref 1–4.8)
LYMPHOCYTES NFR BLD AUTO: 31.6 %
MCH RBC QN AUTO: 28.9 PG (ref 26–33)
MCHC RBC AUTO-ENTMCNC: 33 GM/DL (ref 32.2–35.5)
MCV RBC AUTO: 87.5 FL (ref 80–94)
MICROALBUMIN UR-MCNC: 18.64 MG/DL
MICROALBUMIN/CREAT RATIO PNL UR: 67 MG/G (ref 0–30)
MONOCYTES ABSOLUTE: 0.7 THOU/MM3 (ref 0.4–1.3)
MONOCYTES NFR BLD AUTO: 8.7 %
NEUTROPHILS ABSOLUTE: 4.4 THOU/MM3 (ref 1.8–7.7)
NEUTROPHILS NFR BLD AUTO: 56.2 %
NRBC BLD AUTO-RTO: 0 /100 WBC
PLATELET # BLD AUTO: 200 THOU/MM3 (ref 130–400)
PMV BLD AUTO: 11.4 FL (ref 9.4–12.4)
POTASSIUM SERPL-SCNC: 4.8 MEQ/L (ref 3.5–5.2)
RBC # BLD AUTO: 5.29 MILL/MM3 (ref 4.7–6.1)
SODIUM SERPL-SCNC: 142 MEQ/L (ref 135–145)
TRIGL SERPL-MCNC: 85 MG/DL (ref 0–199)
WBC # BLD AUTO: 7.8 THOU/MM3 (ref 4.8–10.8)

## 2024-11-19 NOTE — PROGRESS NOTES
1955    Chief Complaint   Patient presents with    Diabetes    Other     Tick bite follow up       HPI  This is a 69 year old male, PMH of DM, presented last visit after a tick bite with 3 day h/o fever, chills for which he went to the ED 10/24. Patient had blood cultures taken, one of his blood cultures came back positive for Coag negative Staph and the other for gram positive bacilli. Patient called in to come back to ED for possible bacteremia. Patient had blood cultures taken again on 10/25 that showed no growth. Patient was admitted for observation. ID consulted, agree this is likely contaminate. He was discharged on Doxycyline for possible Lyme disease. He followed up with Dr Dillon on 10/31/24 with bicytopenia found on labs at that visit.    Today he feels well. Denies any bleeding, bruising.  He has been compliant with Trulicity 4.5 mg weekly. He states he has not been eating well but has been exercising regularly. He has not had any hypoglycemic episodes. Recent eye exam 1 month back.  He also reports a dental infection currently for which he is on amoxicillin    Past Medical History:   Diagnosis Date    Benign essential tremor     History of Stage III Prostate Adenocarcinoma 03/21/2017    Prostate removed May 2017    Transient hypertension     Well controlled on no antihypertensives    Type 2 diabetes mellitus with hyperlipidemia (HCC)     Umbilical hernia without obstruction and without gangrene 05/02/2017       Past Surgical History:   Procedure Laterality Date    BICEPS TENDON REPAIR Right 2015    EYE SURGERY  07/31/2018      Left    HERNIA REPAIR  05/02/2017    [place]    PROSTATECTOMY  05/2017    Dr Mcelroy--[facility]    TOTAL KNEE ARTHROPLASTY Bilateral 2011       Current Outpatient Medications   Medication Sig Dispense Refill    rosuvastatin (CRESTOR) 10 MG tablet TAKE 1 TABLET NIGHTLY 90 tablet 3    Dulaglutide (TRULICITY) 4.5 MG/0.5ML SOPN Inject 4.5

## 2024-11-21 ENCOUNTER — OFFICE VISIT (OUTPATIENT)
Dept: INTERNAL MEDICINE CLINIC | Age: 69
End: 2024-11-21

## 2024-11-21 VITALS
OXYGEN SATURATION: 95 % | DIASTOLIC BLOOD PRESSURE: 81 MMHG | WEIGHT: 240.2 LBS | SYSTOLIC BLOOD PRESSURE: 132 MMHG | HEIGHT: 70 IN | BODY MASS INDEX: 34.39 KG/M2 | HEART RATE: 88 BPM

## 2024-11-21 DIAGNOSIS — E11.65 TYPE 2 DIABETES MELLITUS WITH HYPERGLYCEMIA, UNSPECIFIED WHETHER LONG TERM INSULIN USE (HCC): Primary | ICD-10-CM

## 2024-11-21 LAB — HBA1C MFR BLD: 8.6 % (ref 4.3–5.7)

## 2024-11-21 RX ORDER — AMOXICILLIN 500 MG/1
500 CAPSULE ORAL 3 TIMES DAILY
COMMUNITY

## 2024-11-21 RX ORDER — METFORMIN HYDROCHLORIDE 500 MG/1
1000 TABLET, EXTENDED RELEASE ORAL DAILY
Qty: 180 TABLET | Refills: 1 | Status: SHIPPED | OUTPATIENT
Start: 2024-11-21 | End: 2024-12-16

## 2024-11-21 RX ORDER — METFORMIN HYDROCHLORIDE 500 MG/1
1000 TABLET, EXTENDED RELEASE ORAL DAILY
Qty: 60 TABLET | Refills: 0 | Status: SHIPPED | OUTPATIENT
Start: 2024-11-21 | End: 2024-12-16

## 2024-12-16 RX ORDER — METFORMIN HYDROCHLORIDE 500 MG/1
1000 TABLET, EXTENDED RELEASE ORAL DAILY
Qty: 180 TABLET | Refills: 1 | Status: SHIPPED | OUTPATIENT
Start: 2024-12-16

## 2025-01-29 NOTE — PROGRESS NOTES
1955    Chief Complaint   Patient presents with    Diabetes     11/21/24 HbA1c 8.6%       HPI   This is a 69 year old male, who is following today for management of his type 2 DM. He has been on trulicity 4.5 mg weekly, last visit metformin 2 gm was added. He is tolerating the medication well, denies any GI side effects. He has been keeping a log of his glucose readings at home, with persistently elevated readings. No symptoms of hypoglycemia/low glucose readings. He continues to exercise daily but has been having difficulty being compliant with his diet.   Diabetic eye exam 8/1/2024 -positive for nonproliferative diabetic retinopathy O[facility] stable.  Renal function normal, elevated microalbumin/creatinine ratio 67 11/18/24 . LDL 56 at goal on Crestor.   Past Medical History:   Diagnosis Date    Benign essential tremor     History of Stage III Prostate Adenocarcinoma 03/21/2017    Prostate removed May 2017    Transient hypertension     Well controlled on no antihypertensives    Type 2 diabetes mellitus with hyperlipidemia (HCC)     Umbilical hernia without obstruction and without gangrene 05/02/2017       Past Surgical History:   Procedure Laterality Date    BICEPS TENDON REPAIR Right 2015    EYE SURGERY  07/31/2018      Left    HERNIA REPAIR  05/02/2017    [place], [place]    PROSTATECTOMY  05/2017    Dr Mcelroy--[facility]    TOTAL KNEE ARTHROPLASTY Bilateral 2011       Current Outpatient Medications   Medication Sig Dispense Refill    empagliflozin (JARDIANCE) 25 MG tablet Take 1 tablet by mouth daily 90 tablet 0    metFORMIN (GLUCOPHAGE-XR) 500 MG extended release tablet TAKE 2 TABLETS BY MOUTH EVERY  tablet 1    rosuvastatin (CRESTOR) 10 MG tablet TAKE 1 TABLET NIGHTLY 90 tablet 3    Dulaglutide (TRULICITY) 4.5 MG/0.5ML SOPN Inject 4.5 mg into the skin once a week 12 Adjustable Dose Pre-filled Pen Syringe 1    MULTIPLE VITAMIN PO Take by mouth daily      Lancets MISC Use to

## 2025-01-30 ENCOUNTER — OFFICE VISIT (OUTPATIENT)
Dept: INTERNAL MEDICINE CLINIC | Age: 70
End: 2025-01-30
Payer: MEDICARE

## 2025-01-30 VITALS
BODY MASS INDEX: 34.32 KG/M2 | WEIGHT: 239.2 LBS | SYSTOLIC BLOOD PRESSURE: 128 MMHG | DIASTOLIC BLOOD PRESSURE: 76 MMHG | RESPIRATION RATE: 16 BRPM | OXYGEN SATURATION: 95 % | TEMPERATURE: 98.2 F | HEART RATE: 84 BPM

## 2025-01-30 DIAGNOSIS — E11.69 TYPE 2 DIABETES MELLITUS WITH HYPERLIPIDEMIA (HCC): Primary | ICD-10-CM

## 2025-01-30 DIAGNOSIS — R80.9 TYPE 2 DIABETES MELLITUS WITH DIABETIC MICROALBUMINURIA, WITHOUT LONG-TERM CURRENT USE OF INSULIN (HCC): ICD-10-CM

## 2025-01-30 DIAGNOSIS — E78.5 TYPE 2 DIABETES MELLITUS WITH HYPERLIPIDEMIA (HCC): Primary | ICD-10-CM

## 2025-01-30 DIAGNOSIS — E11.29 TYPE 2 DIABETES MELLITUS WITH DIABETIC MICROALBUMINURIA, WITHOUT LONG-TERM CURRENT USE OF INSULIN (HCC): ICD-10-CM

## 2025-01-30 PROCEDURE — 99214 OFFICE O/P EST MOD 30 MIN: CPT

## 2025-01-30 PROCEDURE — 1159F MED LIST DOCD IN RCRD: CPT

## 2025-01-30 PROCEDURE — 1123F ACP DISCUSS/DSCN MKR DOCD: CPT

## 2025-01-30 ASSESSMENT — PATIENT HEALTH QUESTIONNAIRE - PHQ9
SUM OF ALL RESPONSES TO PHQ QUESTIONS 1-9: 0
SUM OF ALL RESPONSES TO PHQ9 QUESTIONS 1 & 2: 0
1. LITTLE INTEREST OR PLEASURE IN DOING THINGS: NOT AT ALL
2. FEELING DOWN, DEPRESSED OR HOPELESS: NOT AT ALL

## 2025-02-21 ENCOUNTER — LAB (OUTPATIENT)
Dept: LAB | Age: 70
End: 2025-02-21

## 2025-02-21 DIAGNOSIS — E11.69 TYPE 2 DIABETES MELLITUS WITH HYPERLIPIDEMIA (HCC): ICD-10-CM

## 2025-02-21 DIAGNOSIS — E78.5 TYPE 2 DIABETES MELLITUS WITH HYPERLIPIDEMIA (HCC): ICD-10-CM

## 2025-02-21 LAB
ANION GAP SERPL CALC-SCNC: 16 MEQ/L (ref 8–16)
BUN SERPL-MCNC: 17 MG/DL (ref 7–22)
CALCIUM SERPL-MCNC: 9.3 MG/DL (ref 8.2–9.6)
CHLORIDE SERPL-SCNC: 103 MEQ/L (ref 98–111)
CO2 SERPL-SCNC: 26 MEQ/L (ref 23–33)
CREAT SERPL-MCNC: 0.8 MG/DL (ref 0.4–1.2)
GFR SERPL CREATININE-BSD FRML MDRD: > 90 ML/MIN/1.73M2
GLUCOSE SERPL-MCNC: 143 MG/DL (ref 70–108)
POTASSIUM SERPL-SCNC: 4.6 MEQ/L (ref 3.5–5.2)
SODIUM SERPL-SCNC: 145 MEQ/L (ref 135–145)

## 2025-02-26 NOTE — PROGRESS NOTES
1955    HPI  This is a 69 year old male, who is following today for management of his type 2 DM. He has been on trulicity 4.5 mg weekly and metformin 2 gm. Last visit Jardiance 10 mg was added to assess for tolerance, with plan to increase dose to 25mg.  He has been keeping a log of his glucose readings at home, with slightly elevated readings. No symptoms of hypoglycemia/low glucose readings. He continues to exercise daily but has been having difficulty being compliant with his diet.   Today he is doing well. He has been tolerating the Jardiance 10 mg without any side effects, increased to 25 mg one week ago.  No visual deficits-last eye exam 2/25. Need to contact ophthalmologist for records  No numbness/tingling or injuries in the feet. Next foot exam due 1/26        Past Medical History:   Diagnosis Date    Benign essential tremor     History of Stage III Prostate Adenocarcinoma 03/21/2017    Prostate removed May 2017    Transient hypertension     Well controlled on no antihypertensives    Type 2 diabetes mellitus with hyperlipidemia (HCC)     Umbilical hernia without obstruction and without gangrene 05/02/2017       Past Surgical History:   Procedure Laterality Date    BICEPS TENDON REPAIR Right 2015    EYE SURGERY  07/31/2018      Left    HERNIA REPAIR  05/02/2017    [place]    PROSTATECTOMY  05/2017    Dr Mcelroy--[facility]    TOTAL KNEE ARTHROPLASTY Bilateral 2011       Current Outpatient Medications   Medication Sig Dispense Refill    empagliflozin (JARDIANCE) 25 MG tablet Take 1 tablet by mouth daily 90 tablet 1    Dulaglutide 4.5 MG/0.5ML SOAJ Inject 4.5 mg into the skin every 7 days 6 mL 1    rosuvastatin (CRESTOR) 10 MG tablet TAKE 1 TABLET NIGHTLY 90 tablet 3    metFORMIN (GLUCOPHAGE-XR) 500 MG extended release tablet TAKE 2 TABLETS BY MOUTH EVERY  tablet 1    MULTIPLE VITAMIN PO Take by mouth daily      Lancets MISC Use to test blood sugar daily

## 2025-02-27 ENCOUNTER — OFFICE VISIT (OUTPATIENT)
Dept: INTERNAL MEDICINE CLINIC | Age: 70
End: 2025-02-27

## 2025-02-27 VITALS
DIASTOLIC BLOOD PRESSURE: 80 MMHG | SYSTOLIC BLOOD PRESSURE: 134 MMHG | HEART RATE: 58 BPM | HEIGHT: 70 IN | BODY MASS INDEX: 33.56 KG/M2 | WEIGHT: 234.4 LBS

## 2025-02-27 DIAGNOSIS — R80.9 TYPE 2 DIABETES MELLITUS WITH DIABETIC MICROALBUMINURIA, WITHOUT LONG-TERM CURRENT USE OF INSULIN (HCC): Primary | ICD-10-CM

## 2025-02-27 DIAGNOSIS — E78.5 TYPE 2 DIABETES MELLITUS WITH HYPERLIPIDEMIA (HCC): ICD-10-CM

## 2025-02-27 DIAGNOSIS — E11.69 TYPE 2 DIABETES MELLITUS WITH HYPERLIPIDEMIA (HCC): ICD-10-CM

## 2025-02-27 DIAGNOSIS — E11.29 TYPE 2 DIABETES MELLITUS WITH DIABETIC MICROALBUMINURIA, WITHOUT LONG-TERM CURRENT USE OF INSULIN (HCC): Primary | ICD-10-CM

## 2025-02-27 DIAGNOSIS — R03.0 ELEVATED BLOOD PRESSURE READING: ICD-10-CM

## 2025-02-27 LAB — HBA1C MFR BLD: 6.9 % (ref 4.3–5.7)

## 2025-02-27 RX ORDER — ROSUVASTATIN CALCIUM 10 MG/1
TABLET, COATED ORAL
Qty: 90 TABLET | Refills: 3 | Status: SHIPPED | OUTPATIENT
Start: 2025-02-27

## 2025-02-27 ASSESSMENT — ENCOUNTER SYMPTOMS
RESPIRATORY NEGATIVE: 1
EYES NEGATIVE: 1
GASTROINTESTINAL NEGATIVE: 1

## 2025-07-11 RX ORDER — METFORMIN HYDROCHLORIDE 500 MG/1
1000 TABLET, EXTENDED RELEASE ORAL DAILY
Qty: 180 TABLET | Refills: 1 | Status: SHIPPED | OUTPATIENT
Start: 2025-07-11

## 2025-08-28 ENCOUNTER — OFFICE VISIT (OUTPATIENT)
Dept: INTERNAL MEDICINE CLINIC | Age: 70
End: 2025-08-28
Payer: MEDICARE

## 2025-08-28 VITALS
DIASTOLIC BLOOD PRESSURE: 70 MMHG | BODY MASS INDEX: 32.01 KG/M2 | HEIGHT: 70 IN | OXYGEN SATURATION: 95 % | SYSTOLIC BLOOD PRESSURE: 122 MMHG | RESPIRATION RATE: 18 BRPM | WEIGHT: 223.6 LBS | TEMPERATURE: 98 F

## 2025-08-28 DIAGNOSIS — Z00.00 MEDICARE ANNUAL WELLNESS VISIT, SUBSEQUENT: Primary | ICD-10-CM

## 2025-08-28 DIAGNOSIS — Z85.46 HISTORY OF ADENOCARCINOMA OF PROSTATE: ICD-10-CM

## 2025-08-28 DIAGNOSIS — E78.00 PURE HYPERCHOLESTEROLEMIA: ICD-10-CM

## 2025-08-28 DIAGNOSIS — E11.29 TYPE 2 DIABETES MELLITUS WITH DIABETIC MICROALBUMINURIA, WITHOUT LONG-TERM CURRENT USE OF INSULIN (HCC): ICD-10-CM

## 2025-08-28 DIAGNOSIS — R80.9 TYPE 2 DIABETES MELLITUS WITH DIABETIC MICROALBUMINURIA, WITHOUT LONG-TERM CURRENT USE OF INSULIN (HCC): ICD-10-CM

## 2025-08-28 PROBLEM — R78.81 BACTEREMIA: Status: RESOLVED | Noted: 2024-10-26 | Resolved: 2025-08-28

## 2025-08-28 LAB — HBA1C MFR BLD: 6.3 % (ref 4.3–5.7)

## 2025-08-28 PROCEDURE — 1160F RVW MEDS BY RX/DR IN RCRD: CPT

## 2025-08-28 PROCEDURE — 1123F ACP DISCUSS/DSCN MKR DOCD: CPT

## 2025-08-28 PROCEDURE — 83036 HEMOGLOBIN GLYCOSYLATED A1C: CPT

## 2025-08-28 PROCEDURE — G0439 PPPS, SUBSEQ VISIT: HCPCS

## 2025-08-28 PROCEDURE — 1159F MED LIST DOCD IN RCRD: CPT

## 2025-08-28 PROCEDURE — 3044F HG A1C LEVEL LT 7.0%: CPT

## 2025-08-28 ASSESSMENT — PATIENT HEALTH QUESTIONNAIRE - PHQ9
2. FEELING DOWN, DEPRESSED OR HOPELESS: NOT AT ALL
SUM OF ALL RESPONSES TO PHQ QUESTIONS 1-9: 0
SUM OF ALL RESPONSES TO PHQ QUESTIONS 1-9: 0
1. LITTLE INTEREST OR PLEASURE IN DOING THINGS: NOT AT ALL
SUM OF ALL RESPONSES TO PHQ QUESTIONS 1-9: 0
SUM OF ALL RESPONSES TO PHQ QUESTIONS 1-9: 0

## 2025-08-28 ASSESSMENT — LIFESTYLE VARIABLES
HOW OFTEN DO YOU HAVE A DRINK CONTAINING ALCOHOL: 2-4 TIMES A MONTH
HOW MANY STANDARD DRINKS CONTAINING ALCOHOL DO YOU HAVE ON A TYPICAL DAY: 1 OR 2